# Patient Record
Sex: MALE | Race: WHITE
[De-identification: names, ages, dates, MRNs, and addresses within clinical notes are randomized per-mention and may not be internally consistent; named-entity substitution may affect disease eponyms.]

---

## 2017-04-10 NOTE — 48 HOUR POST ANESTHESIA EVAL
Post Anesthesia Evaluation


Procedure:  cataract extraction with IOL


Date of Evaluation:  Apr 10, 2017


Time of Evaluation:  09:03


Blood Pressure Systolic:  136


0:  74


Pulse Rate:  75


Respiratory Rate:  14


O2 Sat by Pulse Oximetry:  96


Airway:  patent


Nausea:  No


Vomiting:  No


Hydration Status:  adequate


Mental Status/LOC:  patient returned to baseline


Follow-up care needed:  N/A











SERGIO TANNER CRNA Apr 10, 2017 09:03

## 2017-04-10 NOTE — OPERATIVE NOTE - PDOC
Opthamology Op Report


Surgical Report


PREPROCEDURE DIAGNOSIS: Visually significant cataract, right eye





POSTPROCEDURE DIAGNOSIS: Visually significant cataract, right eye





PROCEDURE: Phacoemulsification with Intra-ocular lens placement, right eye, 

complex.





SURGEON: Gianni Peralta MD





ANESTHESIA: MAC, local anesthesia





IMPLANT(S): NISSA IOL: ZCBOO, Power 18 diopters





DRAINS: None


SPECIMEN: None


ESTIMATED BLOOD LOSS: None


BLOOD PRODUCTS ADMINISTERED: None


COMPLICATIONS: NoneDate of Discussion: 4/10/17 





A face-to-face discussion with the [] regarding the patient's advanced care 

planning took place during this hospitalization on the above date. The 

discussion included the explanation and discussion of advance directives and 

associated forms/documents, as well as the patient's current code status. We 

also discussed at length the patient's medical conditions (both acute and 

chronic), general prognosis, treatment options, and goals of care. The 

following summarizes the discussion:


Advance Care Planning/Goals of Care:


- Will attempt to fill out an AD and/or POLST with the patient prior to 

discharge, if not already completed


- Continue current evaluation and management of any acute and chronic medical 

issues


- Will continue to support the patient/family


- Will continue to discuss both short- and long-term goals of care


DPOA-HC/Surrogate Decision Maker:


None currently appointed []


Code Status:


Full Code 


AD Forms/Documents Completed:


Deferred 





A total of 31 minutes was spent on this discussion, including counseling, 

answering questions, and completing, if any, pertinent advanced care planning 

forms/documents.





FINDINGS: Opacified lens





INDICATIONS FOR PROCEDURE: 


This patient has a Visually significant cataract, who presented with blurred 

vision and difficulty reading and driving. 


Risks, benefits and alternatives to cataract surgery were explained to the 

patient, who agreed to proceed with the procedure. Informed consent was 

obtained and signed by the patient.





DESCRIPTION OF PROCEDURE:


The patient was seen by me along with anesthesia team in the pre op area and 

the surgical site was marked and confirmed. Anesthetic drops along with 

dilating drops was instilled in surgical eye in the pre op. The patient was 

then brought back to the operating loki placed in supine position. The eye was 

prepped with Betadine 5% and draped in sterile manner for the ophthalmic 

surgery.





An eyelid speculum was was placed to keep the eyelid open. Paracentesis wound 

was made superiorly though clear cornea near the limbus using 1.2mm side-port 

blade. Preservative free Lidocaine 1% was injected into the anterior chamber 

through the paracentesis wound. The anterior chamber was inflated with 

viscoelastic. A keratome blade was used to make a bi-planar, shelved, clear 

corneal incision, starting at temporal limbus and then tunneling through clear 

cornea to enter the anterior chamber.


 


Due to Flomax medication, a 7 mm Malyugin ring was positioned to maintain the 

pupil adequately dilated. A circular curvilinear continue capsulorrhexis was 

initiated by cystotome and continued with capsulorhexis forceps. The capsular 

flap then was removed. Hydrodissection and hydrodelineation was performed using 

BSS until the lens was freely rotatable. The lens nucleus was then removed 

using the phacoemulsification handpiece. The residual cortex was removed with 

the irrigation/aspiration handpieces. The posterior capsule was polished.  The 

capsular bag and anterior chamber were inflated with viscoelastic, and the IOL 

was inserted into the capsular bag. The Malyugin ring was removed.  Using 

irrigation/Aspiration handpiece on the aspiration mode, Healon was removed from 

the anterior and posterior chamber. The wound was adjusted and made water tight 

without sutures.  The intraocular pressure was adjusted to normal.





The speculum was removed. Vigamox and prednisolone acetate drops were placed on 

the eye. A clear shield were placed over the operative eye. All sponge and 

needle counts were correct.  The patient was transferred to the recovery room 

in stable condition having tolerated the procedure well.











GIANNI PERALTA Apr 10, 2017 08:54

## 2017-04-10 NOTE — ANETHESIA PREOPERATIVE EVAL
Anesthesia Pre-op PMH/ROS


General


Date of Evaluation:  Apr 10, 2017


Time of Evaluation:  07:40


Anesthesiologist:  ruben


ASA Score:  ASA 2


Mallampati Score


Class I : Soft palate, uvula, fauces, pillars visible


Class II: Soft palate, uvula, fauces visible


Class III: Soft palate, base of uvula visible


Class IV: Only hard plate visible


Mallampati Classification:  Class III


Surgeon:  kyle


Diagnosis:  cataract


Surgical Procedure:  cataract extraction with IOL


Anesthesia History:  none


Family History:  no anesthesia problems


Allergies:  


Coded Allergies:  


     SHELLFISH DERIVED (Unverified  Allergy, Intermediate, ITCHING/HIVES, 7/25/ 14)


     IODINE (Unverified  Allergy, Unknown, 7/25/14)


Uncoded Allergies:  


     SPINACH (Allergy, Intermediate, SEVERE ITCHING/HIVES, 7/25/14)


Medications:  see eMAR





Past Medical History


Cardiovascular:  Reports: HTN


Pulmonary:  Reports: other - hx PE


Gastrointestinal/Genitourinary:  Denies: CRI, ESRD, GERD, other


Neurologic/Psychiatric:  Reports: depression/anxiety


Endocrine:  Denies: DM, hypothyroidism, other, steroids


HEENT:  Reports: cataract (R)


Hematology/Immune:  Reports: other - PE from hip surgery


Musculoskeletal/Integumentary:  Denies: DDD, DJD, OA, RA, edema, other


PSxH Narrative:


hip surgery in 2014





Anesthesia Pre-op Phys. Exam


Physician Exam





Last Vital Signs








  Date Time  Temp Pulse Resp B/P Pulse Ox O2 Delivery O2 Flow Rate FiO2


 


4/10/17 07:15 97.0 75 18 136/85 95 Room Air  








Constitutional:  NAD


Neurologic:  CN 2-12 intact


Cardiovascular:  RRR


Respiratory:  CTA


Gastrointestinal:  S/NT/ND





Airway Exam


Mallampati Classification


3


MO:  full


Neck:  thick


ROM:  full


Dentures:  no lower, no upper





Anesthesia Pre-op A/P


Studies


Pre-op Studies:  EKG





Risk Assessment & Plan


Assessment:


denies sob, chest, able to lie flat


Plan:


mac


Status Change Before Surgery:  No





Pre-Antibiotics


Drug:  none











SERGIO TANNER CRNA Apr 10, 2017 08:15

## 2017-04-10 NOTE — IMMEDIATE POST-OP EVALUATION
Immediate Post-Op Evalulation


Immediate Post-Op Evalulation


Procedure:  cataract extraction with IOL


Date of Evaluation:  Apr 10, 2017


Time of Evaluation:  08:40


IV Fluids:  300


Blood Pressure Systolic:  149


Blood Pressure Diastolic:  94


Pulse Rate:  76


Respiratory Rate:  14


O2 Sat by Pulse Oximetry:  97


Temperature (Fahrenheit):  97.9


Nausea:  No


Vomiting:  No


Patient Status:  awake


Hydration Status:  adequate


Drug:  none











FLORESITARILLIONSERGIO CRNA Apr 10, 2017 08:43

## 2017-04-10 NOTE — PRE-PROCEDURE NOTE/ATTESTATION
Pre-Procedure Note/Attestation


Complete Prior to Procedure


Planned Procedure:  right


Procedure Narrative:


Cataract with intraocular lens, right eye





Indications for Procedure


Pre-Operative Diagnosis:


Cataract, right eye





Attestation


I attest that I discussed the nature of the procedure; its benefits; risks and 

complications; and alternatives (and the risks and benefits of such alternatives

), prior to the procedure, with the patient (or the patient's legal 

representative).





I attest that, if there was a reasonable possibility of needing a blood 

transfusion, the patient (or the patient's legal representative) was given the 

[facility] standardized written summary, pursuant 

to the [place] Blood Safety Act ([place] Health and Safety Code # 1645, as 

amended).





I attest that I re-evaluated the patient just prior to the surgery and that 

there has been no change in the patient's H&P, except as documented below:











GIANNI PERALTA Apr 10, 2017 08:43

## 2017-08-03 NOTE — DIAGNOSTIC IMAGING REPORT
Indication: Back pain



Technique: Continuous helical transaxial imaging of the lumbar spine was obtained

from the lung bases to the pubic symphysis.  No IV contrast was administered.

Coronal 2-D reformats were also obtained. Study obtained in a Siemens sensation 64

slice CT.



Total Dose length Product (DLP):  637 mGycm



CT Dose Index Volume (CTDIvol):   0.25, 18.53 mGy





Comparison: None



Findings: There is no evidence of an acute fracture or malalignment. There is

generalized osteopenia. Marginal endplate spurs are demonstrated throughout the

lumbar and visualized lower thoracic spine mild in degree. There is some narrowing

of the L3-4 disc. Hypertrophy of the lumbar facets noted mild in degree. There is no

soft tissue swelling. Aorta is moderately calcified.



Impression: No acute injury identified.



Spondylosis and osteopenia as described above.



Atherosclerotic disease



Dr. kaufman has communicated the preliminary results to the Emergency Department.

There are no significant discrepancies.







The CT scanner at [facility] is accredited by the American College 

of

Radiology and the scans are performed using dose optimization techniques as

appropriate to a performed exam including Automatic Exposure control.

## 2017-08-03 NOTE — CONSULTATION
DATE OF CONSULTATION:  08/03/2017



NEUROLOGICAL CONSULTATION



CONSULTING PHYSICIAN:  Tee Bonilla M.D.



REQUESTING PHYSICIAN:  Surekha Dewey M.D.



HISTORY OF PRESENT ILLNESS:  The patient is a 67-year-old man, seen

in neurological consultation to evaluate the episodes of unresponsiveness

with a fall and head trauma.



The patient informed me that he was getting ready for cataract surgery,

which was scheduled for this morning.  So for the last hours, he was not

drinking water and he was unable to sleep more than one hour.  He woke up

around 4:30 a.m., took his walker and went to the bathroom, there while

turning around, he lost consciousness and fell down.  Caregiver was

present and took him to the emergency room.



The patient apparently had injuries to his head, right wrist, and lower

back.  On arrival, he was complaining of pain in the injured side.



His vital signs on admission were stable.  Blood pressure 132/80, heart

rate of 96, and temperature of 97.5 degrees.



EKG, normal sinus rhythm.  No premature ventricular contractions.

Imaging studies were obtained and this included CAT scan of the brain.

This revealed mild diffuse atrophy, nonspecific small vessel disease, and

no evidence of acute abnormalities.



CT scan of the lumbar spine was obtained revealing osteopenia.  No acute

injury noted.  Atherosclerotic disease was noted.



There was evidence of multilevel degenerative joint disease.



Wrist x-ray, no fracture, some degenerative changes noted.  Laboratory

work included mild anemia, hemoglobin 13.7 and hematocrit 31.1.  There is

evidence of coagulopathy with INR of 2.9 and PT of 30.6.  Urinalysis was

normal.  Toxicology panel, phenytoin level of 9.2  Chemistry panel was

unremarkable except alkaline phosphatase 146 and a glucose 116.



Since admission to present, there was no further paroxysmal event

detected.



PAST MEDICAL HISTORY:  The patient has a history of hepatitis B.

There is a history of pulmonary embolism three years ago during which he

had fall, fractured his left hip, and required total hip replacement.  He

developed abnormality of gait.  Noted that, walking more than one block,

he develops profound weakness in his legs.  Also, he is using now

walker.



He has a history of migraine, history of glaucoma, and history of

chronic seizure disorder, the last seizure 30 years ago.  He is maintained

on anticonvulsants.



He has a history of basal cell carcinoma.  He underwent a cataract

surgery in the right eye in April of this year.



MEDICATIONS:  Treatment prior to admission included Tylenol,

amitriptyline 75 mg at bedtime, lansoprazole, Dilantin 100 mg t.i.d.,

primidone 250 mg t.i.d., tamsulosin, and warfarin 7.5 mg daily.



ALLERGIES:  Iodine, shellfish, _____, and spinach.



SOCIAL HISTORY:  Lives at home alone, but has a caregiver, who takes

care of him.  No alcohol.  No drug abuse.



FAMILY HISTORY:  Noncontributory.



REVIEW OF SYMPTOMS:  Aches and pains in his low back region and pain

on the top of the head from the injuries.



Denies chest pain or palpitations.  No respiratory difficulties.  Denies

abdominal pain or discomfort.  He has a urinary frequency, but no

constipation.



PHYSICAL EXAMINATION:

GENERAL:  A well-developed and well-nourished man, not in acute

distress, lying comfortably in bed.

HEENT:  Head, normocephalic with a bruise.

NECK:  Supple.  No meningeal signs.

MUSCULOSKELETAL EXAMINATION:  Tenderness to palpation in the lumbar

paraspinal region and right wrist.  Peripheral pulses 1+ symmetric.

Postoperative scarring in the left hip region.

MENTAL STATUS:  Alert and oriented x3.  Speech is fluent.  Language

intact.  There is no aphasia.  No apraxia.  Cognitive function normal.

CRANIAL NERVE II:  Pupils both responding to light and accommodation.

Extraocular movement, full range.

CRANIAL NERVE V:  Normal corneal responses.

CRANIAL NERVE VII:  No facial asymmetry.

CRANIAL NERVE VIII:  Slight decrease in hearing.

CRANIAL NERVES IX THROUGH XII:  Tongue is in midline.  Symmetric

palate elevation.

MOTOR EXAMINATION:  Revealed a normal muscle tone.  Strength 5/5 in

all extremities.  No involuntary movement.  Deep tendon reflexes 1+

symmetric with downgoing toes on both sides.

SENSORY EXAM:  Normal to pinprick and light touch.

GAIT:  Not tested, but reported able to ambulate with a walker.



IMPRESSION:

1. History of syncopal episode, most likely vasovagal due to sleep

deprivation and dehydration.

Rule out cardiac arrhythmia.

2. Chronic seizure disorder, fair control.

3. History of pulmonary embolism, now on anticoagulation.

4. History of migraine.

5. Mild anemia.



RECOMMENDATIONS:

1. Maintain proper hydration.

2. Continue with the current anticonvulsants.

3. Continue with the current treatment.

4. Cardiac workup pending.

5. The patient to be observed for any paroxysmal events.  Meanwhile,

continue with primidone and Dilantin.

6. From neuro point of view, the patient is cleared for eye procedure.



Thank you for allowing me to see this interesting patient in

neurological consultation.









  ______________________________________________

  Tee YOANDY Bonilla





DR:  YOANDY

D:  08/03/2017 15:50

T:  08/03/2017 22:12

JOB#:  [id number]

CC:

## 2017-08-03 NOTE — NEUROLOGY PROGRESS NOTE
Objective


Physical Exam





Last Vital Signs








  Date Time  Temp Pulse Resp B/P Pulse Ox O2 Delivery O2 Flow Rate FiO2


 


8/3/17 14:50 98.1 87 21 132/72 97 Room Air  











Laboratory Tests








Test


  8/3/17


07:10 8/3/17


08:00


 


White Blood Count


  6.5 K/UL


(4.8-10.8) 


 


 


Red Blood Count


  4.21 M/UL


(4.70-6.10)  L 


 


 


Hemoglobin


  13.7 G/DL


(14.2-18.0)  L 


 


 


Hematocrit


  41.1 %


(42.0-52.0)  L 


 


 


Mean Corpuscular Volume 98 FL (80-99)   


 


Mean Corpuscular Hemoglobin


  32.6 PG


(27.0-31.0)  H 


 


 


Mean Corpuscular Hemoglobin


Concent 33.4 G/DL


(32.0-36.0) 


 


 


Red Cell Distribution Width


  12.6 %


(11.6-14.8) 


 


 


Platelet Count


  197 K/UL


(150-450) 


 


 


Mean Platelet Volume


  7.0 FL


(6.5-10.1) 


 


 


Neutrophils (%) (Auto)


  78.3 %


(45.0-75.0)  H 


 


 


Lymphocytes (%) (Auto)


  12.0 %


(20.0-45.0)  L 


 


 


Monocytes (%) (Auto)


  8.2 %


(1.0-10.0) 


 


 


Eosinophils (%) (Auto)


  0.6 %


(0.0-3.0) 


 


 


Basophils (%) (Auto)


  0.9 %


(0.0-2.0) 


 


 


Prothrombin Time


  30.6 SEC


(9.30-11.50)  H 


 


 


Prothromb Time International


Ratio 2.9 (0.9-1.1)


H 


 


 


Activated Partial


Thromboplast Time 42 SEC (23-33)


H 


 


 


Sodium Level


  141 mEQ/L


(135-145) 


 


 


Potassium Level


  3.7 mEQ/L


(3.4-4.9) 


 


 


Chloride Level


  102 mEQ/L


() 


 


 


Carbon Dioxide Level


  26 mEQ/L


(20-30) 


 


 


Anion Gap 13 (5-15)   


 


Blood Urea Nitrogen


  13 mg/dL


(7-23) 


 


 


Creatinine


  0.9 mg/dL


(0.7-1.2) 


 


 


Estimat Glomerular Filtration


Rate > 60 mL/min


(>60) 


 


 


Glucose Level


  116 mg/dL


()  H 


 


 


Calcium Level


  9.4 mg/dL


(8.6-10.2) 


 


 


Total Bilirubin


  0.3 mg/dL


(0.0-1.2) 


 


 


Aspartate Amino Transf


(AST/SGOT) 22 U/L (5-40)  


  


 


 


Alanine Aminotransferase


(ALT/SGPT) 18 U/L (3-41)  


  


 


 


Alkaline Phosphatase


  146 U/L


()  H 


 


 


Troponin I


  < 0.30 ng/mL


(<=0.30) 


 


 


Total Protein


  7.0 g/dL


(6.6-8.7) 


 


 


Albumin


  4.0 g/dL


(3.5-5.2) 


 


 


Globulin 3.0 g/dL   


 


Albumin/Globulin Ratio 1.3 (1.0-2.7)   


 


Phenytoin (Dilantin) Level


  9.2 ug/mL


(10-20)  L 


 


 


Urine Color  Pale yellow  


 


Urine Appearance  Clear  


 


Urine pH  7 (4.5-8.0)  


 


Urine Specific Gravity


  


  1.010


(1.005-1.035)


 


Urine Protein


  


  Negative


(NEGATIVE)


 


Urine Glucose (UA)


  


  Negative


(NEGATIVE)


 


Urine Ketones


  


  Negative


(NEGATIVE)


 


Urine Occult Blood


  


  Negative


(NEGATIVE)


 


Urine Nitrite


  


  Negative


(NEGATIVE)


 


Urine Bilirubin


  


  Negative


(NEGATIVE)


 


Urine Urobilinogen


  


  Normal MG/DL


(0.0-1.0)


 


Urine Leukocyte Esterase


  


  Negative


(NEGATIVE)


 


Urine RBC


  


  0-2 /HPF (0 -


0)  H


 


Urine WBC


  


  0-2 /HPF (0 -


0)


 


Urine Squamous Epithelial


Cells 


  Occasional


/LPF


 


Urine Bacteria


  


  Occasional


/HPF (NONE)











Impression/Recommendations


Problems:  


(1) Syncope, vasovagal


(2) Seizure disorder, complex partial


Status:  stable


Recommendations


# [id number]











KRISTY STEIN Aug 3, 2017 15:54

## 2017-08-03 NOTE — EMERGENCY ROOM REPORT
History of Present Illness


General


Chief Complaint:  Head, Face, Neck Trauma


Source:  Patient





Present Illness


HPI


The patient 67-year-old male who presented after a fall.  Patient stated that 

he had increased pain to his head as well as to his right wrist and low back.  

Patient had unclear loss of consciousness.  He had prior history of Coumadin 

use.  Is followed by Dr. Monreal.  Patient states he has no recent tetanus 

vaccine last 10 years.   The patient stated that he did not recall hitting the 

floor.


Allergies:  


Coded Allergies:  


     SHELLFISH DERIVED (Unverified  Allergy, Intermediate, ITCHING/HIVES, 7/25/ 14)


     IODINE (Unverified  Allergy, Unknown, 7/25/14)


Uncoded Allergies:  


     SPINACH (Allergy, Intermediate, SEVERE ITCHING/HIVES, 7/25/14)





Patient History


Reviewed Nursing Documentation:  PMH: Agreed, PSxH: Agreed





Nursing Documentation-PMH


Hx Cardiac Problems:  Yes - cardiopulmonary emboli-2014, hepatitis B


Hx COPD:  Yes


Hx Cancer:  Yes - BASAL CELL CARCINOMA SKIN


Hx Gastrointestinal Problems:  Yes


Hx Neurological Problems:  Yes - cataract surgery right eye April 2017


Hx Seizures:  Yes - 30 YEARS AGO


Hx Epilepsy:  Yes - Well controlled with medications - last seizure 35 yrs ago


Hx Syncope:  Yes - THIS AM


Hx Headaches:  Yes - MIGRAINES


Hx Numbness:  Yes - TIPS OF THE TOES





Review of Systems


All Other Systems:  negative except mentioned in HPI





Physical Exam





Vital Signs








  Date Time  Temp Pulse Resp B/P Pulse Ox O2 Delivery O2 Flow Rate FiO2


 


8/3/17 06:08 97.5 96 19 132/80 93 Room Air  








Sp02 EP Interpretation:  reviewed, normal


General Appearance:  normal inspection, well appearing, no apparent distress, 

alert, GCS 15


Head:  atraumatic


ENT:  normal ENT inspection, hearing grossly normal, normal voice


Neck:  normal inspection, full range of motion, supple, no bony tend


Respiratory:  normal inspection, lungs clear, normal breath sounds, no 

respiratory distress, no retraction, no wheezing


Cardiovascular #1:  regular rate, rhythm, no edema


Gastrointestinal:  normal inspection, normal bowel sounds, non tender, soft, no 

guarding, no hernia


Genitourinary:  no CVA tenderness


Musculoskeletal:  normal inspection, back normal, normal range of motion


Neurologic:  normal inspection, alert, responsive, speech normal


Psychiatric:  normal inspection, judgement/insight normal, mood/affect normal


Skin:  normal inspection, normal color, no rash





Medical Decision Making


Diagnostic Impression:  


 Primary Impression:  


 Laceration of head


 Additional Impressions:  


 Lumbar back pain


 History of Coumadin therapy


 Syncopal episodes


ER Course


Patient presented for fall. Differential diagnosis included was not limited to 

neck fracture, CVA, close head injury, syncopal episode, basilar ischemia.  

Because of complexity of patient's case laboratory testing and imaging studies 

were ordered.The patient's history consistent with possible syncopal episode 

versus seizure.  Patient endorsed Dr Miller.





Labs








Test


  8/3/17


07:10 8/3/17


08:00 8/4/17


05:40


 


Troponin I


  < 0.30 ng/mL


(<=0.30) 


  


 


 


Phenytoin (Dilantin) Level


  9.2 ug/mL


(10-20) 


  


 


 


Urine Color  Pale yellow  


 


Urine Appearance  Clear  


 


Urine pH  7 (4.5-8.0)  


 


Urine Specific Gravity


  


  1.010


(1.005-1.035) 


 


 


Urine Protein


  


  Negative


(NEGATIVE) 


 


 


Urine Glucose (UA)


  


  Negative


(NEGATIVE) 


 


 


Urine Ketones


  


  Negative


(NEGATIVE) 


 


 


Urine Occult Blood


  


  Negative


(NEGATIVE) 


 


 


Urine Nitrite


  


  Negative


(NEGATIVE) 


 


 


Urine Bilirubin


  


  Negative


(NEGATIVE) 


 


 


Urine Urobilinogen


  


  Normal MG/DL


(0.0-1.0) 


 


 


Urine Leukocyte Esterase


  


  Negative


(NEGATIVE) 


 


 


Urine RBC


  


  0-2 /HPF (0 -


0) 


 


 


Urine WBC


  


  0-2 /HPF (0 -


0) 


 


 


Urine Squamous Epithelial


Cells 


  Occasional


/LPF 


 


 


Urine Bacteria


  


  Occasional


/HPF (NONE) 


 


 


White Blood Count


  


  


  5.3 K/UL


(4.8-10.8)


 


Red Blood Count


  


  


  4.18 M/UL


(4.70-6.10)


 


Hemoglobin


  


  


  13.6 G/DL


(14.2-18.0)


 


Hematocrit


  


  


  40.4 %


(42.0-52.0)


 


Mean Corpuscular Volume   97 FL (80-99) 


 


Mean Corpuscular Hemoglobin


  


  


  32.6 PG


(27.0-31.0)


 


Mean Corpuscular Hemoglobin


Concent 


  


  33.7 G/DL


(32.0-36.0)


 


Red Cell Distribution Width


  


  


  12.5 %


(11.6-14.8)


 


Platelet Count


  


  


  213 K/UL


(150-450)


 


Mean Platelet Volume


  


  


  7.7 FL


(6.5-10.1)


 


Neutrophils (%) (Auto)


  


  


  62.8 %


(45.0-75.0)


 


Lymphocytes (%) (Auto)


  


  


  21.8 %


(20.0-45.0)


 


Monocytes (%) (Auto)


  


  


  13.3 %


(1.0-10.0)


 


Eosinophils (%) (Auto)


  


  


  1.1 %


(0.0-3.0)


 


Basophils (%) (Auto)


  


  


  1.0 %


(0.0-2.0)


 


Prothrombin Time


  


  


  36.8 SEC


(9.30-11.50)


 


Prothromb Time International


Ratio 


  


  3.5 (0.9-1.1) 


 


 


Activated Partial


Thromboplast Time 


  


  52 SEC (23-33) 


 


 


Sodium Level


  


  


  141 mEQ/L


(135-145)


 


Potassium Level


  


  


  3.6 mEQ/L


(3.4-4.9)


 


Chloride Level


  


  


  103 mEQ/L


()


 


Carbon Dioxide Level


  


  


  24 mEQ/L


(20-30)


 


Anion Gap   14 (5-15) 


 


Blood Urea Nitrogen


  


  


  14 mg/dL


(7-23)


 


Creatinine


  


  


  0.7 mg/dL


(0.7-1.2)


 


Estimat Glomerular Filtration


Rate 


  


  > 60 mL/min


(>60)


 


Glucose Level


  


  


  107 mg/dL


()


 


Calcium Level


  


  


  9.4 mg/dL


(8.6-10.2)


 


Total Bilirubin


  


  


  0.3 mg/dL


(0.0-1.2)


 


Aspartate Amino Transf


(AST/SGOT) 


  


  21 U/L (5-40) 


 


 


Alanine Aminotransferase


(ALT/SGPT) 


  


  16 U/L (3-41) 


 


 


Alkaline Phosphatase


  


  


  143 U/L


()


 


Total Protein


  


  


  6.9 g/dL


(6.6-8.7)


 


Albumin


  


  


  4.0 g/dL


(3.5-5.2)


 


Globulin   2.9 g/dL 


 


Albumin/Globulin Ratio   1.3 (1.0-2.7) 


 


Triglycerides Level


  


  


  114 mg/dL (<


150)


 


Cholesterol Level


  


  


  195 mg/dL (<


200)


 


LDL Cholesterol


  


  


  58 mg/dL


(60-99)


 


HDL Cholesterol


  


  


  114 mg/dL (>


60)


 


Cholesterol/HDL Ratio   1.7 (3.3-4.4) 


 


Thyroid Stimulating Hormone


(TSH) 


  


  1.010 uIU/mL


(0.300-4.500)








EKG Diagnostic Results


Rate:  normal


Rhythm:  NSR


ST Segments:  no acute changes





Rhythm Strip Diag. Results


EP Interpretation:  yes


Rhythm:  NSR, no PVC's, no ectopy





Last Vital Signs








  Date Time  Temp Pulse Resp B/P Pulse Ox O2 Delivery O2 Flow Rate FiO2


 


8/3/17 06:08 97.5 96 19 132/80 93 Room Air  








Status:  unchanged


Disposition:  ADMITTED AS INPATIENT


Condition:  Serious











Zafar Salazar Aug 3, 2017 06:45

## 2017-08-03 NOTE — DIAGNOSTIC IMAGING REPORT
Indication: Headache



Technique: Contiguous 5 mm thick transaxial imaging of the head obtained in a

[employer] Sensation 64 slice CT scanner.  Soft tissue and bone windows generated.



Total Dose length Product (DLP):  1495 mGycm



CT Dose Index Volume (CTDIvol):   70.38 mGy



Comparison: none



Findings: There is mild prominence of the ventricles, basal cisterns, and cerebral

sulci consistent with atrophy. Mild, nonspecific, white matter hypoattenuation is

noted throughout the brain consistent with chronic small vessel disease.



There is no midline shift, edema, acute hemorrhage, mass effect, or abnormal

extra-axial fluid collections. Bones and extra osseous soft tissues are

unremarkable.



Impression: No acute intracranial bleed, mass effect or edema.



Mild atrophy of the brain.



Nonspecific white matter hypoattenuation probably due to chronic small vessel

disease.







The CT scanner at [facility] is accredited by the [employer] College 

of

Radiology and the scans are performed using dose optimization techniques as

appropriate to a performed exam including Automatic Exposure control.

## 2017-08-03 NOTE — HISTORY AND PHYSICAL
History of Present Illness


General


Date patient seen:  Aug 3, 2017


Reason for Hospitalization:  Head, Face, Neck Trauma





Present Illness


HPI


 67-year-old male with Hx of PE, seizures  who presented after a fall.  Patient 

stated that he had increased pain to his head as well as to his right wrist and 

low back.   The patient stated that he did not recall hitting the floor.  He is 

admitted to telemetry for wok up of his syncopal episode


Allergies:  


Coded Allergies:  


     SHELLFISH DERIVED (Unverified  Allergy, Intermediate, ITCHING/HIVES, 7/25/ 14)


     IODINE (Unverified  Allergy, Unknown, 7/25/14)


Uncoded Allergies:  


     SPINACH (Allergy, Intermediate, SEVERE ITCHING/HIVES, 7/25/14)





Medication History


Scheduled


Acetaminophen* (Acetaminophen 325MG Tablet*), 325 MG ORAL Q4H, (Reported)


Amitriptyline Hcl* (Amitriptyline Hcl*), 75 MG ORAL BEDTIME, (Reported)


Lansoprazole* (Lansoprazole*), 30 MG ORAL DAILY, (Reported)


Latanoprost* (Xalatan*), 1 DROP BOTH EYES BEDTIME, (Reported)


Phenytoin Sodium Extended* (Dilantin*), 100 MG ORAL THREE TIMES A DAY, (Reported

)


Phenytoin Sodium Extended* (Dilantin*), 100 MG ORAL THREE TIMES A DAY, (Reported

)


Primidone* (Mysoline*), 250 MG ORAL THREE TIMES A DAY


Tamsulosin Hcl (Tamsulosin Hcl*), 0.4 MG ORAL BEDTIME, (Reported)


Tolterodine Tartrate (Tolterodine Tartrate), 4 MG PO DAILY, (Reported)


Warfarin Sod* (Warfarin Sod*), 7.5 MG ORAL DAILY, (Reported)





Discontinued Medications


Brimonidine Tartrate* (Alphagan*), 1 DROP BOTH EYES TID, (Reported)


   Discontinued Reason: Pt stopped taking med


Dorzolamide Hcl* (Trusopt*), 1 DROP BOTH EYES BID, (Reported)


   Discontinued Reason: Pt stopped taking med





Patient History


Healthcare decision maker





Resuscitation status





Advanced Directive on File


Yes





Past Medical/Surgical History


Past Medical/Surgical History:  


(1) Seizures


(2) Pulmonary embolism





Review of Systems


All Other Systems:  negative except mentioned in HPI





Physical Exam


General Appearance:  WD/WN, no apparent distress


Lines, tubes and drains:  peripheral, central line


HEENT:  normocephalic, atraumatic


Neck:  non-tender, normal alignment


Respiratory/Chest:  chest wall non-tender, lungs clear


Breasts:  no masses


Cardiovascular/Chest:  normal rate


Abdomen:  normal bowel sounds, non tender


Genitourinary/Rectal:  normal rectal exam


Extremities:  normal range of motion





Last 24 Hour Vital Signs








  Date Time  Temp Pulse Resp B/P Pulse Ox O2 Delivery O2 Flow Rate FiO2


 


8/3/17 14:34 98.1 87 21 132/72 97 Room Air  


 


8/3/17 10:08  83 15 138/94 94 Room Air  


 


8/3/17 08:36 97.5 81 18 143/87 91 Room Air  


 


8/3/17 08:32 97.5       


 


8/3/17 06:55 97.5 86 18 148/83 93 Room Air  


 


8/3/17 06:08 97.5 96 19 132/80 93 Room Air  











Laboratory Tests








Test


  8/3/17


07:10 8/3/17


08:00


 


White Blood Count


  6.5 K/UL


(4.8-10.8) 


 


 


Red Blood Count


  4.21 M/UL


(4.70-6.10)  L 


 


 


Hemoglobin


  13.7 G/DL


(14.2-18.0)  L 


 


 


Hematocrit


  41.1 %


(42.0-52.0)  L 


 


 


Mean Corpuscular Volume 98 FL (80-99)   


 


Mean Corpuscular Hemoglobin


  32.6 PG


(27.0-31.0)  H 


 


 


Mean Corpuscular Hemoglobin


Concent 33.4 G/DL


(32.0-36.0) 


 


 


Red Cell Distribution Width


  12.6 %


(11.6-14.8) 


 


 


Platelet Count


  197 K/UL


(150-450) 


 


 


Mean Platelet Volume


  7.0 FL


(6.5-10.1) 


 


 


Neutrophils (%) (Auto)


  78.3 %


(45.0-75.0)  H 


 


 


Lymphocytes (%) (Auto)


  12.0 %


(20.0-45.0)  L 


 


 


Monocytes (%) (Auto)


  8.2 %


(1.0-10.0) 


 


 


Eosinophils (%) (Auto)


  0.6 %


(0.0-3.0) 


 


 


Basophils (%) (Auto)


  0.9 %


(0.0-2.0) 


 


 


Prothrombin Time


  30.6 SEC


(9.30-11.50)  H 


 


 


Prothromb Time International


Ratio 2.9 (0.9-1.1)


H 


 


 


Activated Partial


Thromboplast Time 42 SEC (23-33)


H 


 


 


Sodium Level


  141 mEQ/L


(135-145) 


 


 


Potassium Level


  3.7 mEQ/L


(3.4-4.9) 


 


 


Chloride Level


  102 mEQ/L


() 


 


 


Carbon Dioxide Level


  26 mEQ/L


(20-30) 


 


 


Anion Gap 13 (5-15)   


 


Blood Urea Nitrogen


  13 mg/dL


(7-23) 


 


 


Creatinine


  0.9 mg/dL


(0.7-1.2) 


 


 


Estimat Glomerular Filtration


Rate > 60 mL/min


(>60) 


 


 


Glucose Level


  116 mg/dL


()  H 


 


 


Calcium Level


  9.4 mg/dL


(8.6-10.2) 


 


 


Total Bilirubin


  0.3 mg/dL


(0.0-1.2) 


 


 


Aspartate Amino Transf


(AST/SGOT) 22 U/L (5-40)  


  


 


 


Alanine Aminotransferase


(ALT/SGPT) 18 U/L (3-41)  


  


 


 


Alkaline Phosphatase


  146 U/L


()  H 


 


 


Troponin I


  < 0.30 ng/mL


(<=0.30) 


 


 


Total Protein


  7.0 g/dL


(6.6-8.7) 


 


 


Albumin


  4.0 g/dL


(3.5-5.2) 


 


 


Globulin 3.0 g/dL   


 


Albumin/Globulin Ratio 1.3 (1.0-2.7)   


 


Phenytoin (Dilantin) Level


  9.2 ug/mL


(10-20)  L 


 


 


Urine Color  Pale yellow  


 


Urine Appearance  Clear  


 


Urine pH  7 (4.5-8.0)  


 


Urine Specific Gravity


  


  1.010


(1.005-1.035)


 


Urine Protein


  


  Negative


(NEGATIVE)


 


Urine Glucose (UA)


  


  Negative


(NEGATIVE)


 


Urine Ketones


  


  Negative


(NEGATIVE)


 


Urine Occult Blood


  


  Negative


(NEGATIVE)


 


Urine Nitrite


  


  Negative


(NEGATIVE)


 


Urine Bilirubin


  


  Negative


(NEGATIVE)


 


Urine Urobilinogen


  


  Normal MG/DL


(0.0-1.0)


 


Urine Leukocyte Esterase


  


  Negative


(NEGATIVE)


 


Urine RBC


  


  0-2 /HPF (0 -


0)  H


 


Urine WBC


  


  0-2 /HPF (0 -


0)


 


Urine Squamous Epithelial


Cells 


  Occasional


/LPF


 


Urine Bacteria


  


  Occasional


/HPF (NONE)








Height (Feet):  5


Height (Inches):  10.00


Weight (Pounds):  204


Medications





Current Medications








 Medications


  (Trade)  Dose


 Ordered  Sig/Eddie


 Route


 PRN Reason  Start Time


 Stop Time Status Last Admin


Dose Admin


 


 Acetaminophen


  (Tylenol)  650 mg  Q4H  PRN


 ORAL


 T>100.5  8/3/17 12:15


 9/2/17 12:14   


 


 


 Al Hydroxide/Mg


 Hydroxide


  (Mylanta II)  30 ml  Q6H  PRN


 ORAL


 dyspepsia  8/3/17 12:15


 9/2/17 12:14   


 


 


 Albuterol/


 Ipratropium


  (DuoNeb


 0.5-3(2.5)mg/3ml)  3 ml  Q4H  PRN


 HHN


 Shortness of Breath  8/3/17 12:15


 8/8/17 12:14   


 


 


 Amitriptyline HCl


  (Elavil)  75 mg  BEDTIME


 ORAL


   8/3/17 21:00


 9/2/17 20:59   


 


 


 Clonidine HCl


  (Catapres)  0.1 mg  Q4H  PRN


 ORAL


 SBP>160mmHg  8/3/17 12:15


 9/2/17 12:14   


 


 


 Dextrose


  (Dextrose 50%)    STAT  PRN


 IV


 Hypoglycemia  8/3/17 12:15


 9/2/17 12:14   


 


 


 Heparin Sodium


  (Porcine)


  (Heparin 5000


 units/ml)  5,000 units  EVERY 12  HOURS


 SUBQ


   8/3/17 21:00


 9/2/17 20:59   


 


 


 Latanoprost


  (Xalatan)  1 drop  BEDTIME


 BOTH EYES


   8/3/17 21:00


 9/2/17 20:59   


 


 


 Lorazepam


  (Ativan 2mg/ml


 1ml)  0.5 mg  Q4H  PRN


 IV


 For Anxiety  8/3/17 12:15


 8/10/17 12:14   


 


 


 Morphine Sulfate


  (Morphine


 Sulfate)  1 mg  Q4H  PRN


 IVP


 For Pain 7-10  8/3/17 12:15


 8/10/17 12:14   


 


 


 Nitroglycerin


  (Ntg)  0.4 mg  Q5M X 3 DOSES PRN


 SL


 Prn Chest Pain  8/3/17 12:15


 9/2/17 12:14   


 


 


 Ondansetron HCl


  (Zofran)  4 mg  Q6H  PRN


 IVP


 Nausea & Vomiting  8/3/17 12:15


 9/2/17 12:14   


 


 


 Phenytoin


  (Dilantin)  100 mg  THREE TIMES A  DAY


 ORAL


   8/3/17 13:00


 9/2/17 12:59   


 


 


 Polyethylene


 Glycol


  (Miralax)  17 gm  HSPRN  PRN


 ORAL


 Constipation  8/3/17 21:00


 9/2/17 20:59   


 


 


 Primidone


  (Mysoline)  250 mg  THREE TIMES A  DAY


 ORAL


   8/3/17 14:00


 9/2/17 13:59   


 


 


 Tamsulosin HCl


  (Flomax)  0.4 mg  BEDTIME


 ORAL


   8/3/17 21:00


 9/2/17 20:59   


 


 


 Temazepam


  (Restoril)  15 mg  HSPRN  PRN


 ORAL


 Insomnia  8/3/17 21:00


 8/10/17 20:59   


 











Assessment/Plan


Problem List:  


(1) Syncopal episodes


ICD Codes:  R55 - Syncope and collapse


SNOMED:  050872519


(2) Laceration of head


ICD Codes:  S01.91XA - Laceration without foreign body of unspecified part of 

head, initial encounter


SNOMED:  661349214


(3) History of Coumadin therapy


ICD Codes:  Z79.01 - Long term (current) use of anticoagulants


SNOMED:  214543380


(4) Seizures


ICD Codes:  R56.9 - Seizures


SNOMED:  19744913


Assessment/Plan


telemetry


2decho


doppler of carotid artery


cardio and neuro evaluation











ROSY CHAVARRIA Aug 3, 2017 14:35

## 2017-08-03 NOTE — DIAGNOSTIC IMAGING REPORT
Indication: Pain



Findings: 3  views of the right wrist were obtained.



No acute fractures, malalignment, erosions or periostitis are identified. Small

cystic focus noted on the ulnar side of the distal radioulnar joint. Small cystic

focus involving the distal pole of the scaphoid also noted. These are 

probably

degenerative in nature. Bone mineralization is within normal limits. Soft tissues

are unremarkable.



Impression: No acute injury.



Some degenerative changes as described above

## 2017-08-04 NOTE — NEUROLOGY PROGRESS NOTE
Interim History


Interim History


ROS Limited/Unobtainable:  No


Complaints:  no sz


Events:  stable





Objective


Physical Exam





Last Vital Signs








  Date Time  Temp Pulse Resp B/P Pulse Ox O2 Delivery O2 Flow Rate FiO2


 


8/4/17 11:37 97.9 76 20 140/77 91 Room Air  











Laboratory Tests








Test


  8/4/17


05:40


 


White Blood Count


  5.3 K/UL


(4.8-10.8)


 


Red Blood Count


  4.18 M/UL


(4.70-6.10)  L


 


Hemoglobin


  13.6 G/DL


(14.2-18.0)  L


 


Hematocrit


  40.4 %


(42.0-52.0)  L


 


Mean Corpuscular Volume 97 FL (80-99)  


 


Mean Corpuscular Hemoglobin


  32.6 PG


(27.0-31.0)  H


 


Mean Corpuscular Hemoglobin


Concent 33.7 G/DL


(32.0-36.0)


 


Red Cell Distribution Width


  12.5 %


(11.6-14.8)


 


Platelet Count


  213 K/UL


(150-450)


 


Mean Platelet Volume


  7.7 FL


(6.5-10.1)


 


Neutrophils (%) (Auto)


  62.8 %


(45.0-75.0)


 


Lymphocytes (%) (Auto)


  21.8 %


(20.0-45.0)


 


Monocytes (%) (Auto)


  13.3 %


(1.0-10.0)  H


 


Eosinophils (%) (Auto)


  1.1 %


(0.0-3.0)


 


Basophils (%) (Auto)


  1.0 %


(0.0-2.0)


 


Prothrombin Time


  36.8 SEC


(9.30-11.50)  H


 


Prothromb Time International


Ratio 3.5 (0.9-1.1)


H


 


Activated Partial


Thromboplast Time 52 SEC (23-33)


H


 


Sodium Level


  141 mEQ/L


(135-145)


 


Potassium Level


  3.6 mEQ/L


(3.4-4.9)


 


Chloride Level


  103 mEQ/L


()


 


Carbon Dioxide Level


  24 mEQ/L


(20-30)


 


Anion Gap 14 (5-15)  


 


Blood Urea Nitrogen


  14 mg/dL


(7-23)


 


Creatinine


  0.7 mg/dL


(0.7-1.2)


 


Estimat Glomerular Filtration


Rate > 60 mL/min


(>60)


 


Glucose Level


  107 mg/dL


()  H


 


Calcium Level


  9.4 mg/dL


(8.6-10.2)


 


Total Bilirubin


  0.3 mg/dL


(0.0-1.2)


 


Aspartate Amino Transf


(AST/SGOT) 21 U/L (5-40)  


 


 


Alanine Aminotransferase


(ALT/SGPT) 16 U/L (3-41)  


 


 


Alkaline Phosphatase


  143 U/L


()  H


 


Total Protein


  6.9 g/dL


(6.6-8.7)


 


Albumin


  4.0 g/dL


(3.5-5.2)


 


Globulin 2.9 g/dL  


 


Albumin/Globulin Ratio 1.3 (1.0-2.7)  


 


Triglycerides Level


  114 mg/dL (<


150)


 


Cholesterol Level


  195 mg/dL (<


200)


 


LDL Cholesterol


  58 mg/dL


(60-99)  L


 


HDL Cholesterol


  114 mg/dL (>


60)  H


 


Cholesterol/HDL Ratio


  1.7 (3.3-4.4)


L


 


Thyroid Stimulating Hormone


(TSH) 1.010 uIU/mL


(0.300-4.500)








General:  well developed, well nourished, no acute distress


Head:  normocophalic, other - R frontal bruize





Neurologic Exam


Mental Status:  awake, alert, oriented x4, normal cognition, good mathematical 

skills, normal recent memory, normal remote memory, preserved visuospatial 

function


Speech:  normal speech, no dysarthia


Language:  normal language, no aphasia


Cranial Nerve II:  fundus normal, visual fields, no papilledema


Cranial Nerves III, IV, VI:  PERRLA, EOMI, pupils


Cranial Nerve V:  normal facial sensations, temporales function normal, 

masseters function normal, pterygoids function normal


Cranial Nerve VII:  no facial asymmetry, normal facial expressions


Cranial Nerve VIII:  normal hearing, no nystagmus


Cranial Nerve IX:  normal palate elevation, gag response


Cranial Nerve X:  no voice hoarseness


Cranial Nerve XI:  SCM symmetric, trapezii function normal


Cranial Nerve XII:  tongue midline, no tongue atrophy/fasciculations


Motor System:  normal muscle tone, strength 5/5, no involuntary movement, no 

muscle wasting


Sensory:  normal pinprick, normal light touch, normal position sense, normal 

graphesthesia


Coordination:  normal finger to nose bilaterally, normal heel to shin 

bilaterally, negative Romberg test


Deep Tendon Reflexes:  0 ankle (L), 0 ankle (R), 0 bicep (L), 0 bicep (R), 0 

brachioradialis (L), 0 brachioradialis (R), 0 knee (L), 0 knee (R), 0 tricep (L)

, 0 tricep (R)


Reflexes:  mute plantar (L), mute plantar (R)


Stance:  normal


Gait:  stable, normal regular, heel + toe gait





Impression/Recommendations


Problems:  


(1) Syncope, vasovagal


(2) Seizure disorder, complex partial


Status:  stable


Recommendations


# [id number]


 cont with dilantin/mysolin


 neuro   stable











KRISTY STEIN Aug 4, 2017 12:05

## 2017-08-04 NOTE — DIAGNOSTIC IMAGING REPORT
--------------- APPROVED REPORT --------------





CPT Code: 83389



Vascular Symptoms

Syncope



Doppler Spectral Velocity Analysis

RightLeft





BILATERAL: CCA/BULB - Imaging reveals irregular, minimal (5-10%) plaque in both carotid 



carotid arteries. The Doppler spectral flow analysis is within normal limits throughout 

the internal and external carotid arteries. VERTEBRALS - Imaging reveals both vertebral 

arteries to be patent, without evidence of stenosis or steal.

## 2017-08-04 NOTE — PULMONOLOGY PROGRESS NOTE
Assessment/Plan


Problems:  


(1) Syncope, vasovagal


(2) History of Coumadin therapy


Assessment/Plan


check echo


cardio evaluation


neuro appreciated





Subjective


ROS Limited/Unobtainable:  No


Constitutional:  Reports: no symptoms


HEENT:  Repors: no symptoms


Respiratory:  Reports: no symptoms


Allergies:  


Coded Allergies:  


     SHELLFISH DERIVED (Unverified  Allergy, Intermediate, ITCHING/HIVES, 7/25/ 14)


     IODINE (Unverified  Allergy, Unknown, 7/25/14)


Uncoded Allergies:  


     SPINACH (Allergy, Intermediate, SEVERE ITCHING/HIVES, 7/25/14)





Objective





Last 24 Hour Vital Signs








  Date Time  Temp Pulse Resp B/P Pulse Ox O2 Delivery O2 Flow Rate FiO2


 


8/4/17 15:33 98.1 103 20 144/75 96   


 


8/4/17 12:00  84      


 


8/4/17 11:37 97.9 76 20 140/77 91 Room Air  


 


8/4/17 08:00  82      


 


8/4/17 07:56  82 18   Room Air  


 


8/4/17 07:46 97.7 84 20 134/81 91 Room Air  


 


8/4/17 04:07 97.1 91 19 142/64 95 Room Air  


 


8/4/17 04:00  86      


 


8/4/17 00:00  94      


 


8/3/17 23:38 97.2       


 


8/3/17 20:00  98      


 


8/3/17 20:00 97.2 94 20 153/93 95 Room Air  


 


8/3/17 19:55  89 20   Room Air  


 


8/3/17 19:12  90      

















Intake and Output  


 


 8/3/17 8/4/17





 19:00 07:00


 


Intake Total 120 ml 


 


Output Total 250 ml 


 


Balance -130 ml 


 


  


 


Intake Oral 120 ml 


 


Output Urine Total 250 ml 


 


# Voids 1 








General Appearance:  WD/WN


HEENT:  normocephalic


Respiratory/Chest:  chest wall non-tender, lungs clear


Cardiovascular:  normal peripheral pulses, normal rate


Abdomen:  normal bowel sounds


Genitourinary:  normal external genitalia


Extremities:  no cyanosis


Laboratory Tests


8/4/17 05:40: 


White Blood Count 5.3, Red Blood Count 4.18L, Hemoglobin 13.6L, Hematocrit 40.4L

, Mean Corpuscular Volume 97, Mean Corpuscular Hemoglobin 32.6H, Mean 

Corpuscular Hemoglobin Concent 33.7, Red Cell Distribution Width 12.5, Platelet 

Count 213, Mean Platelet Volume 7.7, Neutrophils (%) (Auto) 62.8, Lymphocytes (%

) (Auto) 21.8, Monocytes (%) (Auto) 13.3H, Eosinophils (%) (Auto) 1.1, 

Basophils (%) (Auto) 1.0, Prothrombin Time 36.8H, Prothromb Time International 

Ratio 3.5H, Activated Partial Thromboplast Time 52H, Sodium Level 141, 

Potassium Level 3.6, Chloride Level 103, Carbon Dioxide Level 24, Anion Gap 14, 

Blood Urea Nitrogen 14, Creatinine 0.7, Estimat Glomerular Filtration Rate > 60

, Glucose Level 107H, Calcium Level 9.4, Total Bilirubin 0.3, Aspartate Amino 

Transf (AST/SGOT) 21, Alanine Aminotransferase (ALT/SGPT) 16, Alkaline 

Phosphatase 143H, Total Protein 6.9, Albumin 4.0, Globulin 2.9, Albumin/

Globulin Ratio 1.3, Triglycerides Level 114, Cholesterol Level 195, LDL 

Cholesterol 58L, HDL Cholesterol 114H, Cholesterol/HDL Ratio 1.7L, Thyroid 

Stimulating Hormone (TSH) 1.010





Current Medications








 Medications


  (Trade)  Dose


 Ordered  Sig/Eddie


 Route


 PRN Reason  Start Time


 Stop Time Status Last Admin


Dose Admin


 


 Acetaminophen


  (Tylenol)  650 mg  Q4H  PRN


 ORAL


 T>100.5  8/3/17 12:15


 9/2/17 12:14  8/4/17 09:14


 


 


 Al Hydroxide/Mg


 Hydroxide


  (Mylanta II)  30 ml  Q6H  PRN


 ORAL


 dyspepsia  8/3/17 12:15


 9/2/17 12:14   


 


 


 Albuterol/


 Ipratropium


  (DuoNeb


 0.5-3(2.5)mg/3ml)  3 ml  Q4H  PRN


 HHN


 Shortness of Breath  8/3/17 12:15


 8/8/17 12:14   


 


 


 Amitriptyline HCl


  (Elavil)  75 mg  BEDTIME


 ORAL


   8/3/17 21:00


 9/2/17 20:59  8/3/17 20:39


 


 


 Clonidine HCl


  (Catapres)  0.1 mg  Q4H  PRN


 ORAL


 SBP>160mmHg  8/3/17 12:15


 9/2/17 12:14   


 


 


 Dextrose


  (Dextrose 50%)    STAT  PRN


 IV


 Hypoglycemia  8/3/17 12:15


 9/2/17 12:14   


 


 


 Latanoprost


  (Xalatan)  1 drop  BEDTIME


 BOTH EYES


   8/3/17 21:00


 9/2/17 20:59  8/3/17 21:41


 


 


 Lorazepam


  (Ativan 2mg/ml


 1ml)  0.5 mg  Q4H  PRN


 IV


 For Anxiety  8/3/17 12:15


 8/10/17 12:14   


 


 


 Morphine Sulfate


  (Morphine


 Sulfate)  1 mg  Q4H  PRN


 IVP


 For Pain 7-10  8/3/17 12:15


 8/10/17 12:14   


 


 


 Nitroglycerin


  (Ntg)  0.4 mg  Q5M X 3 DOSES PRN


 SL


 Prn Chest Pain  8/3/17 12:15


 9/2/17 12:14   


 


 


 Ondansetron HCl


  (Zofran)  4 mg  Q6H  PRN


 IVP


 Nausea & Vomiting  8/3/17 12:15


 9/2/17 12:14   


 


 


 Patient Own


 Medication


  (Patient's Own


 Med)  1 ea  TID


 ORAL


   8/4/17 09:00


 9/3/17 08:59  8/4/17 13:56


 


 


 Polyethylene


 Glycol


  (Miralax)  17 gm  HSPRN  PRN


 ORAL


 Constipation  8/3/17 21:00


 9/2/17 20:59   


 


 


 Primidone


  (Mysoline)  250 mg  Q8H


 ORAL


   8/3/17 21:00


 9/2/17 20:59  8/4/17 13:55


 


 


 Tamsulosin HCl


  (Flomax)  0.4 mg  BEDTIME


 ORAL


   8/3/17 21:00


 9/2/17 20:59  8/3/17 20:39


 


 


 Temazepam


  (Restoril)  15 mg  HSPRN  PRN


 ORAL


 Insomnia  8/3/17 21:00


 8/10/17 20:59   


 


 


 Warfarin Sodium


  (Coumadin)  7.5 mg  DAILY@17


 ORAL


   8/4/17 17:00


 8/9/17 16:59 ROSY ARMAS Aug 4, 2017 17:54

## 2017-08-05 NOTE — PULMONOLOGY PROGRESS NOTE
Assessment/Plan


Assessment/Plan


ASSESSMENT


s/p fall 


syncopal episode, likely vasovagal 2 to dehydration and sleep deprivation 


dehydration  


chronic seizure disorder 


hx of PE, on a/coagulation therapy 


Migraines 


 





PLAN OF CARE   


tele


CT head no acute IC pathology  


CT L spine no acute injury 


R wrist X ray no acute injury  


Carotid Duplex minimal plaque  bilateral carotid 


neuro follows 


per neuro syncope likely vasovagal in origin due to dehydration and sleep 

deprivation  


IVF


Lipid panel stable  


seizure precautions 


continue Dilantin 





cardio eval pending 


troponin negative 


SR on tele 


BP stable 


ECHO with EF 55% and RVSO of 10   Couamdin resumed 


O2 HHN prn 


PT/OT 


fall precautions 


still migraines  


pain management 


transfer to [facility] floor 


dc plan for tomorrow   





case discussed and evaluated by supervising physician





Subjective


Allergies:  


Coded Allergies:  


     SHELLFISH DERIVED (Unverified  Allergy, Intermediate, ITCHING/HIVES, 7/25/ 14)


     IODINE (Unverified  Allergy, Unknown, 7/25/14)


Uncoded Allergies:  


     SPINACH (Allergy, Intermediate, SEVERE ITCHING/HIVES, 7/25/14)


Subjective


c/o headaches 


no chest pain, no SOB no dizziness, no blackouts    


no  seizure activity





Objective





Last 24 Hour Vital Signs








  Date Time  Temp Pulse Resp B/P Pulse Ox O2 Delivery O2 Flow Rate FiO2


 


8/5/17 12:00 97.5 85 18 129/83 94 Room Air  


 


8/5/17 12:00  56      


 


8/5/17 08:00  82      


 


8/5/17 08:00 97.5 80 18 121/75 93 Room Air  


 


8/5/17 07:44  76 20   Room Air  


 


8/5/17 04:00 97.4 72 20 115/65 94 Room Air  


 


8/5/17 03:48  85      


 


8/5/17 00:00 97.6 76 20 116/65 93 Room Air  


 


8/5/17 00:00  86      


 


8/4/17 20:01  80 18   Room Air  


 


8/4/17 20:00 97.2 84 19 134/82 90 Room Air  


 


8/4/17 19:49  83      


 


8/4/17 19:41 97.2       


 


8/4/17 16:00  88      


 


8/4/17 15:33 98.1 103 20 144/75 96   

















Intake and Output  


 


 8/4/17 8/5/17





 19:00 07:00


 


Intake Total 720 ml 50 ml


 


Output Total 650 ml 285 ml


 


Balance 70 ml -235 ml


 


  


 


Intake Oral 720 ml 50 ml


 


Output Urine Total 650 ml 285 ml


 


# Voids  3


 


# Bowel Movements 1 








General Appearance:  no acute distress


HEENT:  normocephalic, atraumatic, anicteric, mucous membranes moist


Respiratory/Chest:  lungs clear, no respiratory distress, no accessory muscle 

use


Cardiovascular:  normal peripheral pulses, normal rate, no JVD


Abdomen:  normal bowel sounds, soft, non tender


Extremities:  no edema, pedal pulses normal


Neurologic/Psychiatric:  abnormal gait - with walker , alert, responsive, 

normal mood/affect


Musculoskeletal:  normal muscle bulk


Laboratory Tests


8/5/17 08:40: 


Prothrombin Time 26.2H, Prothromb Time International Ratio 2.5H





Current Medications








 Medications


  (Trade)  Dose


 Ordered  Sig/Eddie


 Route


 PRN Reason  Start Time


 Stop Time Status Last Admin


Dose Admin


 


 Acetaminophen


  (Tylenol)  650 mg  Q4H  PRN


 ORAL


 Fever/Headache/Mild Pain  8/5/17 00:15


 9/4/17 00:14  8/5/17 09:13


 


 


 Al Hydroxide/Mg


 Hydroxide


  (Mylanta II)  30 ml  Q6H  PRN


 ORAL


 dyspepsia  8/3/17 12:15


 9/2/17 12:14   


 


 


 Albuterol/


 Ipratropium


  (DuoNeb


 0.5-3(2.5)mg/3ml)  3 ml  Q4H  PRN


 HHN


 Shortness of Breath  8/3/17 12:15


 8/8/17 12:14   


 


 


 Amitriptyline HCl


  (Elavil)  75 mg  BEDTIME


 ORAL


   8/3/17 21:00


 9/2/17 20:59  8/4/17 21:48


 


 


 Clonidine HCl


  (Catapres)  0.1 mg  Q4H  PRN


 ORAL


 SBP>160mmHg  8/3/17 12:15


 9/2/17 12:14   


 


 


 Dextrose


  (Dextrose 50%)    STAT  PRN


 IV


 Hypoglycemia  8/3/17 12:15


 9/2/17 12:14   


 


 


 Latanoprost


  (Xalatan)  1 drop  BEDTIME


 BOTH EYES


   8/3/17 21:00


 9/2/17 20:59  8/4/17 22:33


 


 


 Lorazepam


  (Ativan 2mg/ml


 1ml)  0.5 mg  Q4H  PRN


 IV


 For Anxiety  8/3/17 12:15


 8/10/17 12:14   


 


 


 Morphine Sulfate


  (Morphine


 Sulfate)  1 mg  Q4H  PRN


 IVP


 For Pain 7-10  8/3/17 12:15


 8/10/17 12:14   


 


 


 Nitroglycerin


  (Ntg)  0.4 mg  Q5M X 3 DOSES PRN


 SL


 Prn Chest Pain  8/3/17 12:15


 9/2/17 12:14   


 


 


 Ondansetron HCl


  (Zofran)  4 mg  Q6H  PRN


 IVP


 Nausea & Vomiting  8/3/17 12:15


 9/2/17 12:14   


 


 


 Patient Own


 Medication


  (Patient's Own


 Med)  1 ea  TID


 ORAL


   8/4/17 09:00


 9/3/17 08:59  8/5/17 13:52


 


 


 Polyethylene


 Glycol


  (Miralax)  17 gm  HSPRN  PRN


 ORAL


 Constipation  8/3/17 21:00


 9/2/17 20:59   


 


 


 Primidone


  (Mysoline)  250 mg  Q8H


 ORAL


   8/3/17 21:00


 9/2/17 20:59  8/5/17 13:52


 


 


 Tamsulosin HCl


  (Flomax)  0.4 mg  BEDTIME


 ORAL


   8/3/17 21:00


 9/2/17 20:59  8/4/17 21:48


 


 


 Temazepam


  (Restoril)  15 mg  HSPRN  PRN


 ORAL


 Insomnia  8/3/17 21:00


 8/10/17 20:59   


 


 


 Tolterodine


 Tartrate


  (Detrol)  2 mg  BID


 ORAL


   8/5/17 09:00


 9/4/17 08:59  8/5/17 09:13


 


 


 Warfarin Sodium


  (Coumadin per


 pharmacy)  1 ea  DAILY  PRN


 MISC


 Per rx protocol  8/5/17 10:15


 9/4/17 10:14   


 


 


 Warfarin Sodium


  (Coumadin)  6 mg  COUMADIN


 ORAL


   8/5/17 17:00


 8/10/17 16:59   


 

















Familia ([facility])Clary NP Aug 5, 2017 15:30

## 2017-08-05 NOTE — CARDIOLOGY PROGRESS NOTE
Assessment/Plan


Assessment/Plan


syncope 





orthostatitc vital if neg ok to dc 





[id number]





Objective





Last 24 Hour Vital Signs








  Date Time  Temp Pulse Resp B/P Pulse Ox O2 Delivery O2 Flow Rate FiO2


 


8/5/17 12:00 97.5 85 18 129/83 94 Room Air  


 


8/5/17 12:00  56      


 


8/5/17 08:00  82      


 


8/5/17 08:00 97.5 80 18 121/75 93 Room Air  


 


8/5/17 07:44  76 20   Room Air  


 


8/5/17 04:00 97.4 72 20 115/65 94 Room Air  


 


8/5/17 03:48  85      


 


8/5/17 00:00 97.6 76 20 116/65 93 Room Air  


 


8/5/17 00:00  86      


 


8/4/17 20:01  80 18   Room Air  


 


8/4/17 20:00 97.2 84 19 134/82 90 Room Air  


 


8/4/17 19:49  83      


 


8/4/17 19:41 97.2       

















Intake and Output  


 


 8/4/17 8/5/17





 19:00 07:00


 


Intake Total 720 ml 50 ml


 


Output Total 650 ml 285 ml


 


Balance 70 ml -235 ml


 


  


 


Intake Oral 720 ml 50 ml


 


Output Urine Total 650 ml 285 ml


 


# Voids  3


 


# Bowel Movements 1 











Laboratory Tests








Test


  8/5/17


08:40


 


Prothrombin Time


  26.2 SEC


(9.30-11.50)  H


 


Prothromb Time International


Ratio 2.5 (0.9-1.1)


H

















PAUL BECKETT Aug 5, 2017 16:36

## 2017-08-05 NOTE — CONSULTATION
DATE OF CONSULTATION:  08/05/2017



CARDIOLOGY CONSULTATION



CONSULTING PHYSICIAN:  Alcon Cheek M.D.



REFERRING PHYSICIAN:  Surekha Dewey M.D.



REASON FOR REFERRAL:  Syncope.



HISTORY OF PRESENT ILLNESS:  This is a 67-year-old gentleman with a

history of multiple medical problems as delineated below.  The patient

apparently was scheduled to come into the hospital for a cataract

extraction with eventual hospitalization, however, got up early in the

morning and went to the bathroom and the next thing he noticed he was on

the floor.  His caretaker apparently told him that he was trying to get

hold of a towel bar that was loose and he fell.  He does not recall the

actual fall.  He has had not had any episode of syncope before.  He does

not have any cardiac issues or problem.  He does not have any chest pain

or pressure.  There is no PND.  He uses one or two pillows.  No heart

pounding or palpitations and usually when he sits up or stands, that he

gets dizzy or lightheaded.  He does have occasional dyspnea on exertion.



PAST MEDICAL HISTORY:  Negative for diabetes, high blood pressure,

heart attack, or high cholesterol.  He does have some lymph cancer.  He

has never had a stroke.  He does have a history of hepatitis B.  No

asthma.  No emphysema.  No ulcers.  No kidney problems.  He does have a

slight degree of cirrhosis that we had biopsy due to diagnosis of

hepatitis C.  He does have some enlarged prostate and he is taking

medication for.  He does have a history of pulmonary embolism on prior

occasions as well after hip surgery that he had sometime ago.  He does

have a history of chronic obstructive pulmonary disease as well and right

ventricular enlargement with abnormal liver functions secondary to massive

liver congestion that occurred in 2014 in addition to pulmonary

hypertension at that time.



ALLERGIES:  He is allergic to iodine, shellfish, and spinach.



SOCIAL HISTORY:  He quit smoking three years ago.  No alcoholic

beverages.  No drug use.



REVIEW OF SYSTEMS:  Gastrointestinal:  He denies any nausea,

vomiting, diarrhea, or constipation.  Genitourinary:  Negative.

Pulmonary:  Negative.  Constitutional:  Negative.  Neurological:

Negative.



PHYSICAL EXAMINATION:

GENERAL:  Shows to be elderly gentleman, in no apparent respiratory

distress.

VITAL SIGNS:  His blood pressure is anywhere between 116/65 to 144/75

and his heart rates in the 70s to 80s and oxygen saturation 93% to 94% on

room air.

NECK:  Supple.  No jugular venous distention.

LUNGS:  Clear to auscultation and percussion.

CARDIAC:  S1 is normal.  S2 is normal.  Regular rate and rhythm.  No

heaves, thrills, gallops, or rubs are noted.

ABDOMEN:  Soft and nontender.  Positive bowel sounds.

EXTREMITIES:  There is no clubbing, cyanosis, or edema.

NEUROLOGIC:  He is awake, alert, and responsive.  Moves all four

extremities.



LABORATORY AND DIAGNOSTIC DATA:  White count of 5.3, hemoglobin 13.6,

and platelet count of 213,000.  Sodium is 141, potassium 3.6, chloride

103, bicarbonate 24, BUN of 14, creatinine 0.7, and glucose of 107.

Alkaline phosphatase was 143.  Troponin less than 0.03.  Total cholesterol

195, LDL of 58, HDL of 114, and TSH of 1.01.  INR was 2.5 today.

Urinalysis was fairly unremarkable.  Tele is negative.  EKG was normal

sinus rhythm, normal QRS axis, some delay in R-wave progression, otherwise

fairly unremarkable.  Imaging modalities, carotid duplex has shown minimal

stenosis between 5 to 10% and he had a CT scan of the spine that showed no

acute injury identified, spondylosis was noted.  Wrist x-rays were no

degenerative changes.  Head CT was no acute intracranial bleed.



ASSESSMENT:

1. Syncope.

2. History of pulmonary embolism previously.

3. History of hepatitis B and questionable history of cirrhosis.



Dr. Dewey, this patient was seen in cardiac consultation.  It is

possible that he has had a syncopal episode because of being NPO according

to himself, although he states he usually drinks more water than he

usually drinks when he came into the hospital.  His neurological workup

appears to be negative and he has had an echocardiogram, preliminary

report shows ejection fraction of 55%, IVC of 2.3, PA pressures only 10.

His EKG is otherwise unremarkable.  He has not had any arrhythmias on

telemetry so far here, but he wants his orthostatic vitals checked just to

make sure that he is not orthostatic, otherwise no other workup may be

necessary in light of the fact that his preliminary workup so far has been

unremarkable.



Dr. Dewey, thank you for allowing me to participate in this patient

care.









  ______________________________________________

  Alcon Cheek M.D.





DR:  ERICH

D:  08/05/2017 16:36

T:  08/05/2017 20:28

JOB#:  [id number]

CC:

## 2017-08-06 NOTE — CARDIOLOGY PROGRESS NOTE
Assessment/Plan


Assessment/Plan


1. Syncope.


2. History of pulmonary embolism previously.


3. History of hepatitis B and questionable history of cirrhosis.


4. orthostatic hypotesnion 








has become much more debiliated than yest 


will need toreapt orthostatic if able to stand to amek sure not need fluid 

after today





Subjective


Cardiovascular:  Denies: chest pain, palpitations


Respiratory:  Denies: shortness of breath


Gastrointestinal/Abdominal:  Denies: abdominal pain


Genitourinary:  Denies: burning





Objective





Last 24 Hour Vital Signs








  Date Time  Temp Pulse Resp B/P Pulse Ox O2 Delivery O2 Flow Rate FiO2


 


8/6/17 16:02 97.7 91 21 141/86 95 Room Air  


 


8/6/17 12:00 98.3 90 20 131/86 93 Room Air  


 


8/6/17 10:00  95      





  93      





  96      


 


8/6/17 08:25  90 20   Room Air  


 


8/6/17 08:00 98.1 95 18 138/87 91 Room Air  


 


8/6/17 04:00 97.5 92 18 138/77 98 Room Air  


 


8/6/17 00:00 98.2 90 16 133/78 90 Room Air  


 


8/5/17 20:37 97.7 90 22 125/75 94 Room Air  


 


8/5/17 17:05  103      


 


8/5/17 17:00  98      


 


8/5/17 16:55  89      








General Appearance:  no apparent distress, alert


Cardiovascular:  normal rate, regular rhythm


Respiratory/Chest:  lungs clear, normal breath sounds


Abdomen:  normal bowel sounds, non tender, soft


Extremities:  no swelling











Intake and Output  


 


 8/5/17 8/6/17





 19:00 07:00


 


Intake Total 730 ml 300 ml


 


Output Total 400 ml 


 


Balance 330 ml 300 ml


 


  


 


Intake Oral 480 ml 


 


IV Total 250 ml 300 ml


 


Output Urine Total 400 ml 


 


# Voids 2 2











Laboratory Tests








Test


  8/6/17


07:29


 


Prothrombin Time


  20.4 SEC


(9.30-11.50)  H


 


Prothromb Time International


Ratio 1.9 (0.9-1.1)


H

















PAUL BECKETT Aug 6, 2017 16:46

## 2017-08-06 NOTE — PULMONOLOGY PROGRESS NOTE
Assessment/Plan


Assessment/Plan


ASSESSMENT


s/p fall 


syncopal episode, likely vasovagal 2 to dehydration and sleep deprivation 


dehydration  


chronic seizure disorder 


hx of PE, on a/coagulation therapy 


Migraines 


 


PLAN OF CARE   


[facility] floor 


CT head no acute IC pathology  


CT L spine no acute injury 


R wrist X ray no acute injury  


Carotid Duplex minimal plaque  bilateral carotid 


neuro follows 


per neuro syncope likely vasovagal in origin due to dehydration and sleep 

deprivation   


neuro workup negative 


IVF


Lipid panel stable  


seizure precautions 


continue Dilantin 





cardio eval appreciated 


troponin negative 


SR on tele 


BP stable 


ECHO with EF 55% and RVSO of 10   Coumadin resumed 


O2 HHN prn  


orthostatic VS x 1 now 


PT/OT 


fall precautions 


pain management 


 


dc today to [facility]  





case discussed and evaluated by supervising physician





Subjective


Allergies:  


Coded Allergies:  


     SHELLFISH DERIVED (Unverified  Allergy, Intermediate, ITCHING/HIVES, 7/25/ 14)


     IODINE (Unverified  Allergy, Unknown, 7/25/14)


Uncoded Allergies:  


     SPINACH (Allergy, Intermediate, SEVERE ITCHING/HIVES, 7/25/14)


Subjective


  


no chest pain, no SOB no dizziness, no blackouts    


no  seizure activity





Objective





Last 24 Hour Vital Signs








  Date Time  Temp Pulse Resp B/P Pulse Ox O2 Delivery O2 Flow Rate FiO2


 


8/6/17 10:00  95      





  93      





  96      


 


8/6/17 08:00 98.1 95 18 138/87 91 Room Air  


 


8/6/17 04:00 97.5 92 18 138/77 98 Room Air  


 


8/6/17 00:00 98.2 90 16 133/78 90 Room Air  


 


8/5/17 20:37 97.7 90 22 125/75 94 Room Air  


 


8/5/17 17:05  103      


 


8/5/17 17:00  98      


 


8/5/17 16:55  89      


 


8/5/17 16:00  98      


 


8/5/17 16:00 96.4 99 22 135/99 94 Room Air  


 


8/5/17 12:00 97.5 85 18 129/83 94 Room Air  


 


8/5/17 12:00  89      

















Intake and Output  


 


 8/5/17 8/6/17





 19:00 07:00


 


Intake Total 730 ml 300 ml


 


Output Total 400 ml 


 


Balance 330 ml 300 ml


 


  


 


Intake Oral 480 ml 


 


IV Total 250 ml 300 ml


 


Output Urine Total 400 ml 


 


# Voids 2 2








Objective


General Appearance:  no acute distress


HEENT:  normocephalic, atraumatic, anicteric, mucous membranes moist


Respiratory/Chest:  lungs clear, no respiratory distress, no accessory muscle 

use


Cardiovascular:  normal peripheral pulses, normal rate, no JVD


Abdomen:  normal bowel sounds, soft, non tender


Extremities:  no edema, pedal pulses normal


Neurologic/Psychiatric:  abnormal gait  with walker , alert, responsive, normal 

mood/affect


Musculoskeletal:  normal muscle bulk


Laboratory Tests


8/6/17 07:29: 


Prothrombin Time 20.4H, Prothromb Time International Ratio 1.9H





Current Medications








 Medications


  (Trade)  Dose


 Ordered  Sig/Eddie


 Route


 PRN Reason  Start Time


 Stop Time Status Last Admin


Dose Admin


 


 Acetaminophen


  (Tylenol)  650 mg  Q4H  PRN


 ORAL


 Fever/Headache/Mild Pain  8/6/17 00:15


 9/5/17 00:14  8/6/17 10:35


 


 


 Al Hydroxide/Mg


 Hydroxide


  (Mylanta II)  30 ml  Q6H  PRN


 ORAL


 dyspepsia  8/6/17 00:15


 9/5/17 00:14   


 


 


 Albuterol/


 Ipratropium


  (DuoNeb


 0.5-3(2.5)mg/3ml)  3 ml  Q4H  PRN


 HHN


 Shortness of Breath  8/6/17 00:15


 8/11/17 00:14   


 


 


 Amitriptyline HCl


  (Elavil)  75 mg  BEDTIME


 ORAL


   8/6/17 21:00


 9/5/17 20:59   


 


 


 Clonidine HCl


  (Catapres)  0.1 mg  Q4H  PRN


 ORAL


 SBP>160mmHg  8/6/17 00:15


 9/5/17 00:14   


 


 


 Dextrose


  (Dextrose 50%)    STAT  PRN


 IV


 Hypoglycemia  8/6/17 12:15


 9/5/17 12:14   


 


 


 Latanoprost


  (Xalatan)  1 drop  BEDTIME


 BOTH EYES


   8/6/17 21:00


 9/5/17 20:59   


 


 


 Lorazepam


  (Ativan 2mg/ml


 1ml)  0.5 mg  Q4H  PRN


 IV


 For Anxiety  8/6/17 00:15


 8/13/17 00:14   


 


 


 Morphine Sulfate


  (Morphine


 Sulfate)  1 mg  Q4H  PRN


 IVP


 For Pain 7-10  8/6/17 00:15


 8/13/17 00:14   


 


 


 Nitroglycerin


  (Ntg)  0.4 mg  Q5M X 3 DOSES PRN


 SL


 Prn Chest Pain  8/6/17 00:15


 9/5/17 00:14   


 


 


 Ondansetron HCl


  (Zofran)  4 mg  Q6H  PRN


 IVP


 Nausea & Vomiting  8/6/17 00:15


 9/5/17 00:14   


 


 


 Patient Own


 Medication


  (Patient's Own


 Med)  1 ea  TID


 ORAL


   8/6/17 09:00


 9/5/17 08:59  8/6/17 10:25


 


 


 Polyethylene


 Glycol


  (Miralax)  17 gm  HSPRN  PRN


 ORAL


 Constipation  8/6/17 21:00


 9/5/17 20:59   


 


 


 Primidone


  (Mysoline)  250 mg  Q8H


 ORAL


   8/6/17 05:00


 9/5/17 04:59  8/6/17 05:00


 


 


 Tamsulosin HCl


  (Flomax)  0.4 mg  BEDTIME


 ORAL


   8/6/17 21:00


 9/5/17 20:59   


 


 


 Temazepam


  (Restoril)  15 mg  HSPRN  PRN


 ORAL


 Insomnia  8/6/17 21:00


 8/13/17 20:59   


 


 


 Tolterodine


 Tartrate


  (Detrol)  2 mg  BID


 ORAL


   8/6/17 09:00


 9/5/17 08:59  8/6/17 10:25


 


 


 Warfarin Sodium


  (Coumadin per


 pharmacy)  1 ea  DAILY  PRN


 MISC


 Per rx protocol  8/6/17 09:00


 9/5/17 08:59   


 


 


 Warfarin Sodium/


 Warfarin Sodium


  (Coumadin/


 Coumadin)  7 mg  COUMADIN  ONCE


 ORAL


   8/6/17 17:00


 8/6/17 17:01   


 

















Familia MoultonCayuga Medical CenterClary Eddy NP Aug 6, 2017 11:24

## 2017-08-06 NOTE — CARDIOLOGY REPORT
--------------- APPROVED REPORT --------------





EKG Measurement

Heart [id number]

PA [id number]

[id number]

[id number]

[id number]





Normal sinus rhythm

Cannot rule out Anterior infarct, age undetermined

Abnormal ECG

## 2017-08-06 NOTE — CARDIOLOGY PROGRESS NOTE
Assessment/Plan


Assessment/Plan


1. Syncope.


2. History of pulmonary embolism previously.


3. History of hepatitis B and questionable history of cirrhosis.


4. orthostatic hypotesnion 








has become much more debiliated than yest 


will need toreapt orthostatic if able to stand to amek sure not need fluid 

after today





Objective





Last 24 Hour Vital Signs








  Date Time  Temp Pulse Resp B/P Pulse Ox O2 Delivery O2 Flow Rate FiO2


 


8/6/17 16:02 97.7 91 21 141/86 95 Room Air  


 


8/6/17 12:00 98.3 90 20 131/86 93 Room Air  


 


8/6/17 10:00  95      





  93      





  96      


 


8/6/17 08:25  90 20   Room Air  


 


8/6/17 08:00 98.1 95 18 138/87 91 Room Air  


 


8/6/17 04:00 97.5 92 18 138/77 98 Room Air  


 


8/6/17 00:00 98.2 90 16 133/78 90 Room Air  


 


8/5/17 20:37 97.7 90 22 125/75 94 Room Air  


 


8/5/17 17:05  103      


 


8/5/17 17:00  98      

















Intake and Output  


 


 8/5/17 8/6/17





 19:00 07:00


 


Intake Total 730 ml 300 ml


 


Output Total 400 ml 


 


Balance 330 ml 300 ml


 


  


 


Intake Oral 480 ml 


 


IV Total 250 ml 300 ml


 


Output Urine Total 400 ml 


 


# Voids 2 2











Laboratory Tests








Test


  8/6/17


07:29


 


Prothrombin Time


  20.4 SEC


(9.30-11.50)  H


 


Prothromb Time International


Ratio 1.9 (0.9-1.1)


H

















PAUL BECKETT Aug 6, 2017 16:56

## 2017-08-07 NOTE — CARDIOLOGY REPORT
--------------- APPROVED REPORT --------------





EXAM: Two-dimensional and M-mode echocardiogram with Doppler and color 

Doppler.



INDICATION

Left Ventricular Function



M-Mode DIMENSIONS 

IVSd0.8 (0.7-1.1cm)Left Atrium (MM)3.0 (1.6-4.0cm)

LVDd4.8 (3.5-5.6cm)Aortic Root2.7 (2.0-3.7cm)

PWd0.8 (0.7-1.1cm)Aortic Cusp Exc.1.9 (1.5-2.0cm)



LVDs2.7 (2.5-4.0cm)

PWs1.3 cm





Normal left ventricular chamber size, systolic function and wall motion.

Left ventricular ejection fraction estimated to be 55 %.

No evidence of ventricular hypertrophy.

Anterior Echo-free space, may be due to pericardial fat or effusion.

All other cardiac chamber sizes are within normal limits.

Focal aortic valve sclerosis with adequate cusp excursion.

Thickened mitral valve leaflets with normal excursion.

Mitral annulus and aortic root calcification.

Pulmonic valve not well visualized.

Normal tricuspid valve structure.

IVC dilated at 2.3cm with physiologic collapse.



A  color flow and spectral Doppler study was performed and revealed:

Mild aortic regurgitation.

No mitral regurgitation.

Mitral diastolic velocities suggest reduced left ventricular relaxation (Grade I).

Trace tricuspid regurgitation.

Tricuspid systolic velocities suggests peak right ventricular systolic pressure of 10 

mmHg. 

No pulmonic regurgitation present.

## 2017-08-08 NOTE — DISCHARGE SUMMARY
Discharge Summary


Hospital Course


Date of Admission


Aug 3, 2017 at 09:30


Date of Discharge


Aug 7, 2017 at 18:39


Admitting Diagnosis


head injury


HPI


Cody Garcia is a 67 year old male who was admitted on Aug 3, 2017 at 09:30 

for Head Injury


Hospital Course


dc summary #[id number]





Discharge Medications


Continued Medications:  


Amitriptyline Hcl* (Amitriptyline Hcl*) 10 Mg Tablet


75 MG ORAL BEDTIME, TAB





Lansoprazole* (Lansoprazole*) 30 Mg Capsule.dr


30 MG ORAL DAILY, CAP





Latanoprost* (Xalatan*) 2.5 Ml Drops


1 DROP BOTH EYES BEDTIME, ML 0 Refills





Phenytoin Sodium Extended* (Dilantin*) 100 Mg Capsule


100 MG ORAL THREE TIMES A DAY, #90 CAP 0 Refills





Primidone* (Mysoline*) 250 Mg Tablet


250 MG ORAL THREE TIMES A DAY, #90 TAB





Tamsulosin Hcl (Tamsulosin Hcl*) 0.4 Mg Cap.er.24h


0.4 MG ORAL BEDTIME, CAP





Tolterodine Tartrate (Tolterodine Tartrate) 1 Mg Tablet


4 MG PO DAILY, TAB





Warfarin Sod* (Warfarin Sod*) 7.5 Mg Tablet


7.5 MG ORAL DAILY, TAB











Discharge


Discharge Disposition


Patient was discharged to Home (01)


Discharge Diagnoses:  











Bravo (Walter)Clary NP Aug 8, 2017 12:21

## 2017-08-09 NOTE — DISCHARGE SUMMARY 2 SIG
DATE OF ADMISSION:  08/03/2017



DATE OF DISCHARGE:  08/07/2017



REASON FOR ADMISSION:  This is a 67-year-old male with a history of

recent pulmonary emboli, seizure disorder, COPD, reported recent fall and

came to emergency room due to the increased pain in the head, right wrist,

and the low back.  The patient reported that he did not recall hitting the

floor, he is a poor historian.  He was admitted for possible syncopal

episode.



ADMITTING DIAGNOSES:

1. Syncopal episode.

2. Pulmonary emboli, on anticoagulation.

3. Seizure disorder.

4. Status post fall.



HOSPITAL STAY:  The patient is admitted to telemetry floor.

Cardiology and Neurology consult were requested to rule out cardiac or

neural causes of syncope.  CT of the head revealed no acute intracranial

pathology.  CT of the lumbar spine revealed no acute injury.  X-ray of the

right wrist also revealed no acute injury.  Carotid duplex revealed

minimal plaque bilateral carotid, _____ follows.  Per Neurology, syncope

likely vasovagal in origin due to dehydration and sleep deprivation as

reported by the patient.  Neurologic workup was negative.  The patient was

on seizure precaution.  Continue Dilantin and Mysoline and no seizure

activity while in the hospital.  Lipid panel stable.  The patient was on

the IV fluids.  Neurology cleared the patient for discharge.  The patient

was working with physical and occupational therapy.  Fall precaution

maintained.  Cardiologist seen the patient.  Troponin negative.  Telemetry

strip revealed sinus rhythm.  Blood pressure was stable.  Echocardiogram

revealed preserved ejection fraction of 55%, right ventricular systolic

pressure of 10.  Coumadin resumed.  Supplemental oxygen provided as needed

to keep saturation above 92%.  Pulmonary toilet provided as needed.

Orthostatic vital signs revealed orthostatic hypotension.  More fluid

given to the patient.  Pain management provided.  The patient was planned

initially to be discharged to skilled nursing facility for short

rehabilitation, however, was unable to arrange due to the insurance

constraint.  The patient was discharged home.  The patient had a caregiver

living in.  INR therapeutic.  Pain controlled.  The patient stable for

discharge.



DISCHARGE DIAGNOSES:

1. Status post fall.

2. Syncopal episode likely vasovagal secondary to dehydration and sleep

deprivation.

3. Dehydration.

4. Orthostatic hypotension related to dehydration.

5. Chronic seizure disorder, partial complex.

6. History of pulmonary emboli, on anticoagulation therapy.

7. Migraines.

8. Mild anemia.  Of note, hemoglobin and hematocrit remained stable,

hemoglobin 13.6 and hematocrit 40.4.







  ______________________________________________

  Surekha Dewey M.D.



I have been assigned to dictate discharge summary on this account and I

was not involved in the patient's management.



  ______________________________________________

  Clary fuentesMELITON morales





DR:  THIERNO

D:  08/08/2017 12:20

T:  08/09/2017 08:34

JOB#:  [id number]

CC:

## 2017-09-11 NOTE — IMMEDIATE POST-OP EVALUATION
Immediate Post-Op Evalulation


Immediate Post-Op Evalulation


Procedure:  L eye cataract extraction wth IOL


Date of Evaluation:  Sep 11, 2017


Time of Evaluation:  08:22


IV Fluids:  20


Blood Products:  none


Estimated Blood Loss:  none


Urinary Output:  none


Blood Pressure Systolic:  148


Blood Pressure Diastolic:  74


Pulse Rate:  65


Respiratory Rate:  20


O2 Sat by Pulse Oximetry:  99


Temperature (Fahrenheit):  97.6


Pain Score (1-10):  1


Nausea:  No


Vomiting:  No


Complications


none


Patient Status:  awake, patent, none


Hydration Status:  adequate











YE MUNOZ M.D. Sep 11, 2017 08:52

## 2017-09-11 NOTE — OPERATIVE NOTE - PDOC
Operative Note


Operative Note


Date of Operation/Procedure:  Sep 11, 2017


Chief Complaint:  Poor vision, left eye


Pre-op Diagnosis:


Cataract


Procedure:


Cataract extraction with intraocular lens


Post-op Diagnosis:


Cataract


Post-op Diagnosis:  same as pre-op


Surgeon:  Yessy


Assistant:  None


Additional Surgeons:  Tristan


Anesthesiologist:  Jason


Anesthesia:  MAC


Specimen:  none


Complications:  none


Condition:  stable


Estimated Blood Loss:  none


Drains:  none


Implant(s) used?:  Yes


Indications for Procedure


Poor vision


Description of Procedure


See dictated report











GIANNI PERALTA Sep 11, 2017 08:22

## 2017-09-11 NOTE — PRE-PROCEDURE NOTE/ATTESTATION
Pre-Procedure Note/Attestation


Complete Prior to Procedure


Planned Procedure:  left


Procedure Narrative:


Cataract extraction with intraocular lens implant





Indications for Procedure


Pre-Operative Diagnosis:


Cataract





Attestation


I attest that I discussed the nature of the procedure; its benefits; risks and 

complications; and alternatives (and the risks and benefits of such alternatives

), prior to the procedure, with the patient (or the patient's legal 

representative).





I attest that, if there was a reasonable possibility of needing a blood 

transfusion, the patient (or the patient's legal representative) was given the 

[facility] standardized written summary, pursuant 

to the [place] Dawn Blood Safety Act ([place] Health and Safety Code # 1645, as 

amended).





I attest that I re-evaluated the patient just prior to the surgery and that 

there has been no change in the patient's H&P, except as documented below:











GIANNI PERALTA Sep 11, 2017 07:21

## 2017-09-11 NOTE — ANETHESIA PREOPERATIVE EVAL
Anesthesia Pre-op PMH/ROS


General


Date of Evaluation:  Sep 11, 2017


Time of Evaluation:  07:08


Anesthesiologist:  Jason


ASA Score:  ASA 3


Mallampati Score


Class I : Soft palate, uvula, fauces, pillars visible


Class II: Soft palate, uvula, fauces visible


Class III: Soft palate, base of uvula visible


Class IV: Only hard plate visible


Mallampati Classification:  Class III


Surgeon:  Yessy


Diagnosis:  L eye cataract


Surgical Procedure:  L eye cataract extraction


Anesthesia History:  none


Family History:  no anesthesia problems


Allergies:  


Coded Allergies:  


     SHELLFISH DERIVED (Unverified  Allergy, Intermediate, ITCHING/HIVES, 7/25/ 14)


     IODINE (Unverified  Allergy, Unknown, 7/25/14)


Uncoded Allergies:  


     SPINACH (Allergy, Intermediate, SEVERE ITCHING/HIVES, 7/25/14)





Past Medical History


Cardiovascular:  Reports: HTN, 


   Denies: CAD, MI, valve dz, arrhythmia, other


Pulmonary:  Denies: asthma, COPD, ADEOLA, other


Gastrointestinal/Genitourinary:  Reports: GERD, 


   Denies: CRI, ESRD, other


Neurologic/Psychiatric:  Reports: depression/anxiety, other - seizers, 


   Denies: dementia, CVA, TIA


Endocrine:  Denies: DM, hypothyroidism, steroids, other


HEENT:  Reports: cataract (L), cataract (R), glaucoma, 


   Denies: Jena (L), Jena (R), other


Hematology/Immune:  Reports: DVT - H/o PE on coumadin, 


   Denies: anemia, bleeding disorder, other


Musculoskeletal/Integumentary:  Denies: OA, RA, DJD, DDD, edema, other


PMH Narrative:


as above


PSxH Narrative:


hip replacement, R eye cataract





Anesthesia Pre-op Phys. Exam


Physician Exam





Last Vital Signs








  Date Time  Temp Pulse Resp B/P (MAP) Pulse Ox O2 Delivery O2 Flow Rate FiO2


 


9/11/17 06:19 97.4 79 19 119/80 93 Room Air  








Constitutional:  NAD


Neurologic:  CN 2-12 intact


Cardiovascular:  RRR, no M/R/G


Respiratory:  CTA


Gastrointestinal:  S/NT/ND





Airway Exam


Mallampati Score:  Class III


MO:  limited


Neck:  stiff


ROM:  limited


Teeth:  missing


Dentures:  no upper, no lower





Anesthesia Pre-op A/P


Labs





Chemistry








Test


  9/11/17


06:30


 


Sodium Level Pending  


 


Potassium Level Pending  


 


Chloride Level Pending  


 


Carbon Dioxide Level Pending  


 


Blood Urea Nitrogen Pending  


 


Creatinine Pending  


 


Estimat Glomerular Filtration


Rate Pending  


 


 


Glucose Level Pending  


 


Calcium Level Pending  











Studies


Pre-op Studies:  EKG - NSR





Risk Assessment & Plan


Assessment:


ASA 3


Plan:


MAC


Status Change Before Surgery:  No





Pre-Antibiotics


Drug:  none











YE MUNOZ M.D. Sep 11, 2017 07:11

## 2017-09-11 NOTE — 48 HOUR POST ANESTHESIA EVAL
Post Anesthesia Evaluation


Procedure:  L eye cataract extraction [facility] IOL


Date of Evaluation:  Sep 11, 2017


Time of Evaluation:  09:00


Blood Pressure Systolic:  132


0:  72


Pulse Rate:  64


Respiratory Rate:  20


Temperature (Fahrenheit):  97.6


O2 Sat by Pulse Oximetry:  98


Airway:  patent


Nausea:  No


Vomiting:  No


Pain Intensity:  1


Hydration Status:  adequate


Cardiopulmonary Status:


stable


Mental Status/LOC:  patient returned to baseline


Follow-up Care/Observations:


n/a


Post-Anesthesia Complications:


none


Follow-up care needed:  ready to discharge











YE MUNOZ M.D. Sep 11, 2017 09:01

## 2017-09-12 NOTE — OPERATIVE NOTE - DICTATED
DATE OF OPERATION:  09/11/2017



SURGEON:  Robert Krishnamurthy M.D.



ASSISTANT:  None.



ANESTHESIA:  MAC.



ANESTHESIOLOGIST:  Cam Gomez M.D.



PREOPERATIVE DIAGNOSES:

1. Combined mechanism, age-related cataract, left eye.

2. Pseudophakia, right eye.

3. Glaucoma.



POSTOPERATIVE DIAGNOSES:

1. Combined mechanism, age-related cataract, left eye.

2. Pseudophakia, right eye.

3. Glaucoma.



Principal Procedure:  Phacoemulsification with primary implantation of

posterior chamber intraocular lens, left eye, complex.



Description of Procedure:  The patient has been evaluated in my office for

few years with history of chronic open-angle glaucoma and bilateral

cataracts.  His glaucoma has been controlled with topical drops.  His

visual acuity gradually diminished due to increasing cataract.  He

underwent successful cataract extraction in his right eye five months ago.

He has been complaining of comparatively poor vision in the left eye.  I

have discussed the risks, benefits, and alternatives, informed consent was

obtained to proceed with cataract extraction in the left eye.



The patient was therefore brought to the [facility]

outpatient surgery unit where the left pupil was dilated and an

intravenous line was started.  He was brought into the operating room

where ocular anesthesia was obtained with topical drops supplemented with

intravenous sedation.  The left eye was then prepped and draped in the

usual fashion per intraocular surgery.



A clear corneal temporal incision was made in the anterior chamber

filled with nonpreserved lidocaine and viscoelastic.  The pupil did not

dilate well.  A 6.25 mm Malyugin ring was carefully positioned.  An

anterior capsulotomy was accomplished.  Hydrodissection was utilized to

facilitate phacoemulsification.   A two-handed phacoemulsification

technique was used to fracture the lens nucleus into quadrants and the

segments removed in sequence.  Irrigation/aspiration hand piece was then

used to remove all residual lens cortex and polish the posterior capsule.

 



Viscoelastic was introduced.  An intraocular lens from [employer], model

ZCB00 with a power of 18 diopters, was folded into the lens insertion

cartridge, injected into the posterior chamber of the eye and positioned

within the capsular bag.  The Malyugin ring was removed.  The viscoelastic

was removed.  Miochol was irrigated to restore the pupil anatomy.  Due to

iris floppiness, a single 10-0 nylon suture was used to secure the wound.

The knot was buried internally.   Wound was tested and found to be

watertight.  The intraocular pressure was adjusted to normal.



One drop of 10% Betadine, one drop of prednisolone acetate, and one drop

of ciprofloxacin were placed on the eye.  The eye was covered in an eye

shield.  This completed the procedure.  All sponge and needle counts were

correct.  The patient was taken to the PAR in good condition having

tolerated the procedure well.









  ______________________________________________

  Robert Krishnamurthy M.D.





DR:  JM/PM

D:  09/11/2017 08:49

T:  09/11/2017 22:22

JOB#:  [id number]

CC:  Radha Monreal M.D.; Fax#:  [phone]

## 2020-03-30 NOTE — DIAGNOSTIC IMAGING REPORT
Indications: History of seizures and frequent falls. Head trauma 3 days ago. Recent

headache from fall

 

Technique: Spiral acquisitions obtained through the brain. Angled axial and coronal 5

x 5 mm slices were reconstructed. Total dose length product 1149 mGycm.  CTDI vol(s)

53 mGy. Dose reduction achieved using automated exposure control

 

Comparison: 8/3/2017

 

Findings: There is age-related enlargement of the ventricles and extra axial CSF

spaces. There is fairly extensive periventricular deep white matter low-attenuation,

consistent with chronic microvascular ischemic change and progressive since the prior

study.. There is encephalomalacia of the right temporal tip. This is a new finding.

No acute intracranial hemorrhage or edema. No mass effect nor midline shift.

 

Impression: Negative for acute intracranial bleed or mass effect

 

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

of 8/30/2017

 

Other chronic and age-related changes, as described

 

This agrees with the preliminary interpretation provided overnight by [facility]

teleradiology service.

 

The CT scanner at [facility] is accredited by the American College of

Radiology and the scans are performed using protocols designed to limit radiation

exposure to as low as reasonably achievable to attain images of sufficient resolution

adequate for diagnostic evaluation.

## 2020-03-30 NOTE — NUR
ED Nurse Note:



Pt brought into ED by [facility] ambulance unit 401 for c/o fall PTA. Pt states 
he fell once at 2200 tonight where he couldn't get himself up and had a 
previous fall this afternoon as well. Pt states fall is mechanical in nature 
and denies losing consciousness or becoming dizzy. Pt states falls have become 
more frequent over the last few months and has had multiple falls over the past 
couple of days. Pt reports hitting his head on wood/metal object. Pt denies any 
n/v, fever or chills. Pt states he has had diarrhea since last night. Pt is 
aaox4, breathing is normal and unlabored. Pt connected to cardiac monitor. 
Safety measures in place. Will continue to monitor pt.

## 2020-03-30 NOTE — EMERGENCY ROOM REPORT
History of Present Illness


General


Chief Complaint:  Multiple Trauma/Fall


Source:  Patient





Present Illness


HPI


This is a 70-year-old male with a history of COPD and seizure.  His seizure is 

been well controlled on Dilantin.  No seizure activity for over 30 years.  He 

presents with chief complaint of frequent falls.  This been ongoing for the 

last 6 months.  But more frequent in the last few days.  He said he fell 3 days 

ago hit his head.  He fell twice today.  Unable to get up.  1 time a trip 

today.  The other time he got up and sat up off the bed and unable to get up 

for an hour.  Patient denies any pain.  Did have a headache couple days ago 

from the fall but resolved now.  No focal deficit.  No nausea no vomiting.  He 

does have some diarrhea started last night.  Denies any fever chills but denies 

any cough or shortness of breath.  No recent travel.


Allergies:  


Coded Allergies:  


     SHELLFISH DERIVED (Unverified  Allergy, Intermediate, ITCHING/HIVES, 7/25/ 14)


     IODINE (Unverified  Allergy, Unknown, 7/25/14)


Uncoded Allergies:  


     SPINACH (Allergy, Intermediate, SEVERE ITCHING/HIVES, 7/25/14)





COVID-19 Screening


Contact w/high risk pt:  No


Recent Travel to affected area:  No


Experienced COVID-19 symptoms?:  No





Patient History


Past Medical History:  COPD, seizures


Past Surgical History:  other - Hip surgery, left





Nursing Documentation-[facility]


Past Medical History:  No History, Except For


Hx Cardiac Problems:  Yes - cardiopulmonary emboli-2014, hepatitis B


Hx COPD:  Yes


Hx Cancer:  Yes - BASAL CELL CARCINOMA SKIN


Hx Gastrointestinal Problems:  Yes


Hx Neurological Problems:  Yes


Hx Seizures:  Yes - 30 YEARS AGO


Hx Epilepsy:  Yes - Well controlled with medications - last seizure 35 yrs ago


Hx Syncope:  Yes - THIS AM


Hx Headaches:  Yes - MIGRAINES


Hx Numbness:  Yes - TIPS OF THE TOES





Review of Systems


Eye:  Denies: eye pain, blurred vision


ENT:  Denies: ear pain, nose congestion, throat swelling


Respiratory:  Denies: cough, shortness of breath


Cardiovascular:  Denies: chest pain, palpitations


Gastrointestinal:  Reports: diarrhea; Denies: abdominal pain, nausea, vomiting


Musculoskeletal:  Denies: back pain, joint pain


Skin:  Denies: rash


Neurological:  Denies: headache, numbness


Endocrine:  Denies: increased thirst, increased urine


Hematologic/Lymphatic:  Denies: easy bruising


All Other Systems:  negative except mentioned in HPI





Physical Exam





Vital Signs








  Date Time  Temp Pulse Resp B/P (MAP) Pulse Ox O2 Delivery O2 Flow Rate FiO2


 


3/30/20 22:44 98.8 88 16 150/83 (105) 92 Room Air  





Vitals with high blood pressure


Sp02 EP Interpretation:  reviewed, normal


General Appearance:  well appearing, no apparent distress, alert


Head:  normocephalic, atraumatic


Eyes:  bilateral eye PERRL, bilateral eye EOMI


ENT:  hearing grossly normal, normal pharynx


Neck:  full range of motion, supple, no meningismus


Respiratory:  chest non-tender, lungs clear, normal breath sounds


Cardiovascular #1:  regular rate, rhythm, no murmur


Gastrointestinal:  normal bowel sounds, non tender, no mass, no organomegaly, 

no bruit, non-distended


Musculoskeletal:  back normal, normal range of motion, other - Left leg appear 

to be shorter than right


Psychiatric:  mood/affect normal





Medical Decision Making


Diagnostic Impression:  


 Primary Impression:  


 Multiple injuries due to trauma


 Additional Impressions:  


 Head injury, acute


 Qualified Codes:  S09.90XA - Unspecified injury of head, initial encounter


 Failure to thrive in adult


ER Course


Patient presents with frequent falls and head injury.  No evidence of any 

intracranial bleed or fractures.  He is unable to ambulate.  He lives by 

himself.  For this reason, will admit for rehab and physical therapy.  I 

discussed the case with Dr. Padron who will admit.


Other X-Ray Diagnostic Results


Other X-Ray Diagnostic Results :  


   X-Ray ordered:  Left hip x-rays


   # of Views/Limited Vs Complete:  4 View


   Indication:  Pain


   EP Interpretation:  Yes


   Interpretation:  no dislocation, no soft tissue swelling, no fractures, 

other - Previous left hip surgery


   Impression:  No acute disease


   Electronically Signed by:  Real Cheung MD





CT/MRI/US Diagnostic Results


CT/MRI/US Diagnostic Results :  


   Imaging Test Ordered:  CT head


   Impression


No acute process per radiologist





Last Vital Signs








  Date Time  Temp Pulse Resp B/P (MAP) Pulse Ox O2 Delivery O2 Flow Rate FiO2


 


3/30/20 22:44 98.8 88 16 150/83 (105) 92 Room Air  








Status:  improved


Disposition:  ADMITTED AS INPATIENT


Condition:  Serious











Real Cheung MD Mar 30, 2020 22:54

## 2020-03-31 NOTE — NUR
NURSE NOTES

Received patient from ED via [facility]. Alert and oriented x4. Saturating 89% on room air. 
Spoke with Dr. Padron and obtained admission orders. Put pt on nasal cannula @ 3LPM, 
saturating 94%. Oriented to room, Instructed to use call light for assistance. Call light in 
reach. Bed in lowest, lock engaged and alarm on. Pt has some meds in pillbox. Will send to 
the pharmacy. Called supervisor for pt's valuables to send to lock box. Supervisor was not 
available. Will endorse to AM. Belongings checked and listed on the paper chart.

## 2020-03-31 NOTE — NUR
*-* INSURANCE *-*



ALL AVAILABLE CLINICALS HAVE BEEN FAXED TO: 



[facility]

P: 

F: [phone] (FAX ALL CLINICALS)

## 2020-03-31 NOTE — DIAGNOSTIC IMAGING REPORT
Indication: Shortness of breath

 

Technique: One view of the chest

 

Comparison: 8/20/2014

 

Findings: Subsegmental atelectasis is seen at both lung bases. The lungs are somewhat

hyperinflated, particularly the upper lobes. The lungs and pleural spaces are

otherwise clear. The heart size is normal.

 

Impression: Bilateral basilar atelectasis

 

COPD changes

 

No acute process otherwise

## 2020-03-31 NOTE — HISTORY & PHYSICAL
History and Physical


History & Physicial


Dictated for Int Med-DR Padron no. [id number].











Dominic Temple MD Mar 31, 2020 16:57

## 2020-03-31 NOTE — NUR
CASE MANAGEMENT: INITIAL REVIEW



70YR OLD MALE BIBA FROM HOME 



CC: MULTIPLE TRAMA/FALL; DIARRHEA; HIT HEAD 3DAYS AGO



HX: COPD AND SEIZURE



SI:MULTIPLE INJURIES DUE TO TRAMA . ACUTE HEAD INJURY .  FAILURE TO THRIVE

98.8   88  16   150/83   92% ON RA

 NA+146 ANION GAP 16 BGLU 130 CA+ 10.2 ALKP 220      H/H 13.9/41.3 





IS:IVF NS BOLUS X1

HIP X-RAY-No acute bony trauma

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 
study of 8/30/2017

CHEST X-RAY-Bilateral basilar atelectasis



\**: [facility] 

DCP:HOME WHEN STABLE 

PLAN:

PT EVAL 

REHAB CONSULT 









CASE MANAGEMENT: REVIEW



03/31/20



HX: COPD AND SEIZURE



SI:MULTIPLE INJURIES DUE TO TRAMA . ACUTE HEAD INJURY .  FAILURE TO THRIVE

98.4   90    18   161/92   97% ON 3L NC

 H/H 14/39.2    BGLU 123 AST 50 ALKP 185





IS:IV D5@75ML/HR

DILANTIN PO QD

MYSOLINE PO QD

HEPARIN SQ TID

PROTONIX PO QD

CLONIDINE Q4HR/PRN 



\**: [facility] 

DCP:HOME WHEN STABLE 



PLAN:

PT EVAL 

REHAB CONSULT 

CALORIE COUNT 

OXYGEN THERAPY - DECREASE SAT

-------------------------------------------------------------------------------

Addendum: 03/31/20 at 1331 by JAJA CROWLEY LVN

-------------------------------------------------------------------------------

****INTERQUAL MET OBSERVATION *****

## 2020-03-31 NOTE — DIAGNOSTIC IMAGING REPORT
Indication: Trauma, frequent falls, unable to get up

 

Technique: One view the pelvis, 2 views of the left hip

 

Comparison: none 

 

Findings: Compression screw and long medullary incko are seen reducing old healed left

intertrochanteric fracture. The hardware appears intact. There is no evidence of

acute fracture. No dislocations. The joint spaces are preserved.

 

Impression: Postsurgical and posttraumatic changes, as described

 

No acute bony trauma

## 2020-03-31 NOTE — NUR
ED Nurse Note:



Pt stabe for transfer to [facility] unit at this time. Pt is aaox4, vss, no acute 
distress noted. Pt taken to unit via gurney by tech. Pt belongings sent with 
pt.

## 2020-03-31 NOTE — NUR
P.T Note: 

P.T evaluation completed and  tx initiated. Please refer to P.T evaluation for current 
functional status. Pt is alert, O x4, pleasant and cooperative. Pt presented pain on R lower 
flank and generalized weakness affecting his balance and  overall mobility independence and 
safety. Pt currently requires MIN A X 1 for bed mobility  and transfers activity especially 
for  gait/ambulation activities due to unsteady gait. Pt is high fall risk category where 
require SNF for further rehab intervention VS going home with P.T follow up at [facility].

Pt however is cleared for OOB activities with nursing assist and using FWW.

Thank you for this referral.

## 2020-03-31 NOTE — NUR
NURSE NOTES:

Received pt in bed, AAO x 4. On NC 3L/min. No c/o pain/distress. IV on RAC 20g noted, 
running D5 1/2 NS @ 75 ml/hr. Patient is high risk for fall due to hx of fall. Educated pt 
to press call light for assistance. Call light within reach. Bed in the lowest, locked, and 
alarm on. Side rails padded for seizure precaution. Will continue to monitor

## 2020-03-31 NOTE — HISTORY AND PHYSICAL REPORT
DATE OF ADMISSION:  03/30/2020

CHIEF COMPLAINT:  The patient is a 70-year-old  male, who presents

with chief complaint of fall injury.



HISTORY OF PRESENT ILLNESS:  The patient states that he has a history of

seizure disorder.  The patient states his last seizure was 30 years ago.

The patient states his seizure disorder is controlled on Dilantin.



The patient states he has been having increasing falls over the past

week.  The patient states he fell twice yesterday, March 30, 2020.  The

patient was reaching for something and fell over.  The patient usually

walks with a walker.  The patient states he feels unsteady on his feet.



The patient presented to [facility] emergency room.  The patient is

admitted with multiple falls to rule out acute cerebrovascular accident.



REVIEW OF SYSTEMS:  CONSTITUTIONAL:  The patient denies weight loss or

weight gain.  The patient denies fevers or chills.  HEENT:  The patient

denies ear or throat pain.  The patient denies headache.  CARDIOVASCULAR:

The patient denies palpitations or chest pain.  CHEST:  The patient denies

wheeze or shortness of breath.  ABDOMEN:  The patient denies nausea,

vomiting, diarrhea, or constipation.  GENITOURINARY:  The patient denies

dysuria or increased frequency of urination.  NEUROMUSCULAR:  The patient

complains of ataxia as above.  The patient denies generalized weakness.



PAST MEDICAL HISTORY:  Significant for:



1. Deep venous thrombosis in 2014.

2. History of pulmonary embolism in 2014.

3. Seizure disorder.

4. Benign prostatic hypertrophy.

5. Chronic obstructive pulmonary disease.



PAST SURGICAL HISTORY:  Significant for:



1. Cataract surgery bilaterally.

2. Left femur intertrochanteric open reduction and internal fixation in

2014.



CURRENT MEDICATIONS:

1. Amitriptyline 75 mg p.o. nightly.

2. Lansoprazole 30 mg p.o. daily.

3. Xalatan one drop to both eyes nightly.

4. Dilantin 100 mg p.o. three times daily.

5. Mysoline 250 mg p.o. three times daily.

6. Tamsulosin 0.4 mg p.o. daily.

7. Tolterodine 4 mg p.o. daily.

8. Coumadin 7.5 mg p.o. daily.



ALLERGIES:  To contrast iodine.



SOCIAL HISTORY:  The patient is single and lives alone.  The patient denies

alcohol use.  The patient admits to previous tobacco use of one pack per

day, however, he quit in 2014.



PHYSICAL EXAMINATION:

VITAL SIGNS:  Temperature 98.8, respirations 16, pulse 88, and blood

pressure 150/83.

GENERAL:  The patient is a well-developed and well-nourished 

male, in no apparent distress.

HEENT:  Eyes, pupils are equal and responsive to light and accommodation.

Extraocular movements are intact.

NECK:  Supple without lymphadenopathy.

CHEST:  Lungs are clear to auscultation bilaterally without wheeze or

rales.

CARDIOVASCULAR:  Regular rate.  S1, S2 are normal without murmurs, rubs, or

gallops.

ABDOMEN:  Soft, nontender, and nondistended.  Positive bowel sounds.  No

evidence of hepatosplenomegaly.  Currently, no rebound or guarding

noted.

EXTREMITIES:  Negative for clubbing, cyanosis, or edema.

RECTAL/GENITAL:  Refused.

NEUROLOGIC:  Cranial nerves II through XII are grossly intact without focal

deficits.  Motor strength is 5/5 bilaterally.  Deep tendon reflexes are 2+

plantar.



LABORATORY STUDIES:  WBC 9.3, hemoglobin 13.9, hematocrit 41.3, and

platelets 185,000.  Sodium 146, potassium 4.2, chloride 103, CO2 27, BUN

18, and creatinine 0.9.  Glucose 130.



CT scan of the brain revealed probable old cerebrovascular accident in

the right temporal region, otherwise no acute disease.



An x-ray of the left hip failed to demonstrate acute fracture.



ASSESSMENT:  This is a 70-year-old _____ male with:



1. Frequent falls.

2. Seizure disorder.

3. Benign prostatic hypertrophy.

4. Chronic obstructive pulmonary disease.



TREATMENT:

1. Fall injury.  This may be secondary to orthostatic hypotension versus

acute cerebrovascular accident.  Carotid duplex Dopplers of the brain are

pending.

2. Seizure disorder.  Continue Dilantin as above.

3. History of deep venous thrombosis/pulmonary embolism.  Continue

Coumadin as above.

4. Cerebrovascular disease.  The patient denies history of stroke.

Continue aspirin as above.

5. Benign prostatic hypertrophy.  Continue Flomax as above.

6. Chronic obstructive pulmonary disease.  A Pulmonary consultation has

been obtained with Dr. Surekha Dewey.









  ______________________________________________

  Dominic Temple M.D.





DR:  DOMINGA

D:  03/31/2020 16:59

T:  03/31/2020 17:13

JOB#:  [id number]

CC:

## 2020-03-31 NOTE — NUR
NURSE NOTES:

Patient awake in bed, no complaints of pain at this time. Will attend to needs. Instructed 
to use call light for assistance. Call light in reach. Bed in lowest, lock engaged and alarm 
on. Will continue plan of care.

## 2020-04-01 NOTE — NUR
NURSE NOTES:

Received patient in bed, awake,not in acute respiratory/cardiac distress @ this time. 
Patient is alert and oriented x3 but forgetful.Patient denies pain or discomfort. Patient is 
high risk of fall. Bed is in lowest position and locked. Call light within reach, bed alarm 
is on zone 2.Patient refused to wear gown @ this time. Yellow gown @ the bedside. Yellow 
socks on. Reminded patient to call nurses if needed. Patient know how to use call light and 
demonstration done. Will continue plan of care.

## 2020-04-01 NOTE — NUR
NURSE NOTES:

Patient is in bed, awake and alert x3, forgetful. No signs of distress or SOB. IV intact and 
patent. Bed locked and in lowest position. Bed alarm activated. Fall precautions in place. 
Walker at bedside. Call light within reach. Reminded patient to call for assistance if 
needed. Will continue plan of care.

## 2020-04-01 NOTE — PULMONOLOGY PROGRESS NOTE
Assessment/Plan


Problems:  


(1) Falls frequently


(2) Failure to thrive in adult


(3) Seizure disorder, complex partial


(4) ACS (acute coronary syndrome)


Assessment/Plan


no new comlaind


neuro evaluation pending


pt/ot


neuro evaluation


MRI of brain


calorie count


symptomatic treatment





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 

since prior study of 8/30/2017





Subjective


ROS Limited/Unobtainable:  No


Interval Events:  looks comfortable


Allergies:  


Coded Allergies:  


     SHELLFISH DERIVED (Unverified  Allergy, Intermediate, ITCHING/HIVES, 7/25/ 14)


     IODINE (Unverified  Allergy, Unknown, 7/25/14)


Uncoded Allergies:  


     SPINACH (Allergy, Intermediate, SEVERE ITCHING/HIVES, 7/25/14)





Objective





Last 24 Hour Vital Signs








  Date Time  Temp Pulse Resp B/P (MAP) Pulse Ox O2 Delivery O2 Flow Rate FiO2


 


4/1/20 11:54 99.0 94 19 133/89 (104) 95   


 


4/1/20 09:00      Nasal Cannula 3.0 


 


4/1/20 08:00 98.6 99 19 146/89 (108) 95   


 


4/1/20 04:00 97.6 79 20 140/75 (96) 95   


 


4/1/20 00:00 97.9 68 20 147/67 (93) 96   


 


3/31/20 21:00      Nasal Cannula 3.0 


 


3/31/20 20:00 97.8 82 18 130/83 (99) 97   


 


3/31/20 16:00 98.6 90 17 114/76 (89) 98   

















Intake and Output  


 


 3/31/20 4/1/20





 19:00 07:00


 


Intake Total 600 ml 800 ml


 


Output Total  500 ml


 


Balance 600 ml 300 ml


 


  


 


Intake IV Total 600 ml 800 ml


 


Output Urine Total  500 ml


 


# Voids  1


 


# Bowel Movements 3 








General Appearance:  WD/WN


HEENT:  normocephalic, mucous membranes moist


Respiratory/Chest:  chest wall non-tender, lungs clear


Cardiovascular:  normal peripheral pulses, normal rate


Abdomen:  soft, non tender, non distended


Extremities:  no cyanosis


Skin:  no lesions


Laboratory Tests


4/1/20 05:45: 


White Blood Count 6.9, Red Blood Count 4.47L, Hemoglobin 14.6, Hematocrit 41.2L

, Mean Corpuscular Volume 92, Mean Corpuscular Hemoglobin 32.7H, Mean 

Corpuscular Hemoglobin Concent 35.4, Red Cell Distribution Width 12.2, Platelet 

Count 198, Mean Platelet Volume 7.0, Neutrophils (%) (Auto) 71.7, Lymphocytes (%

) (Auto) 16.6L, Monocytes (%) (Auto) 9.9, Eosinophils (%) (Auto) 0.7, Basophils 

(%) (Auto) 1.1, Erythrocyte Sedimentation Rate 16, Sodium Level 141, Potassium 

Level 3.0L, Chloride Level 101, Carbon Dioxide Level 27, Anion Gap 13, Blood 

Urea Nitrogen 8, Creatinine 0.6, Estimat Glomerular Filtration Rate > 60, 

Glucose Level 113H, Calcium Level 9.3, Phosphorus Level 2.2L, Magnesium Level 

1.9, Total Bilirubin 0.5, Aspartate Amino Transf (AST/SGOT) 41H, Alanine 

Aminotransferase (ALT/SGPT) 44, Alkaline Phosphatase 186H, C-Reactive Protein, 

Quantitative 15.9H, Total Protein 7.0, Albumin 3.3L, Globulin 3.7, Albumin/

Globulin Ratio 0.9L, Vitamin B12 Level 654


4/1/20 12:10: Methylmalonic Acid [Pending]





Current Medications








 Medications


  (Trade)  Dose


 Ordered  Sig/Eddie


 Route


 PRN Reason  Start Time


 Stop Time Status Last Admin


Dose Admin


 


 Acetaminophen


  (Tylenol)  650 mg  Q6H  PRN


 ORAL


 Mild Pain/Temp > 100.5  3/31/20 02:15


 4/30/20 02:14  4/1/20 10:31


 


 


 Amitriptyline HCl


  (Elavil)  25 mg  BEDTIME


 ORAL


   3/31/20 21:00


 4/30/20 20:59  3/31/20 21:19


 


 


 Clonidine HCl


  (Catapres Tab)  0.1 mg  Q4H  PRN


 ORAL


 sbp greater than 160  3/31/20 08:30


 6/29/20 08:29  3/31/20 09:02


 


 


 Dextrose/Sodium


 Chloride  1,000 ml @ 


 75 mls/hr  [id number]


 IV


   3/31/20 02:15


 4/30/20 02:14  3/31/20 23:51


 


 


 Heparin Sodium


  (Porcine)


  (Heparin 5000


 units/ml)  5,000 units  EVERY 8  HOURS


 SUBQ


   3/31/20 06:00


 5/15/20 05:59  4/1/20 13:31


 


 


 Latanoprost


  (Xalatan)  1 drop  BEDTIME


 BOTH EYES


   3/31/20 21:00


 4/30/20 20:59  3/31/20 21:18


 


 


 Lorazepam


  (Ativan 2mg/ml


 1ml)  0.5 mg  Q4H  PRN


 IV


 For Anxiety  4/1/20 13:45


 4/8/20 13:44   


 


 


 Non-Formulary


 Medication


  (Non-Formulary


 Med)  1 ea  BEDTIME


 ORAL


   3/31/20 21:00


 4/30/20 20:59 UNV  


 


 


 Ondansetron HCl


  (Zofran)  4 mg  PRN  PRN


 IVP


 Nausea & Vomiting  3/31/20 00:15


     


 


 


 Pantoprazole


  (Protonix)  40 mg  DAILY


 ORAL


   3/31/20 09:00


 4/30/20 08:59  4/1/20 08:39


 


 


 Phenytoin


  (Dilantin)  100 mg  BEDTIME


 ORAL


   3/31/20 21:00


 4/30/20 20:59  3/31/20 21:19


 


 


 Phenytoin


  (Dilantin)  200 mg  DAILY


 ORAL


   3/31/20 09:00


 4/30/20 08:59  4/1/20 08:39


 


 


 Primidone


  (Mysoline)  250 mg  DAILY


 ORAL


   3/31/20 09:00


 4/30/20 08:59  4/1/20 08:40


 


 


 Primidone


  (Mysoline)  500 mg  BEDTIME


 ORAL


   3/31/20 21:00


 4/30/20 20:59  3/31/20 21:18


 


 


 Sumatriptan


 Succinate


  (Imitrex)  100 mg  DAILY  PRN


 ORAL


 For Pain  3/31/20 02:30


 4/30/20 02:29   


 


 


 Tamsulosin HCl


  (Flomax)  0.8 mg  BEDTIME


 ORAL


   3/31/20 21:00


 4/30/20 20:59  3/31/20 21:19


 

















Surekha Dewey MD Apr 1, 2020 13:38

## 2020-04-01 NOTE — NUR
NURSE NOTES:

Patient left the unit for MRI in stable condition. Patient's upper and lower dentures are @ 
the bedside.

## 2020-04-01 NOTE — NUR
NURSE NOTES:

Patient attempted to get out of bed, causing bed alarm to sound. Assisted patient to 
bathroom with his bedside walker. While returning patient to bed, patient started to loose 
his balance. RN maintained hold of the patient and guided him to the floor. Patient then 
insisted he lie completely down on the floor, stating he wants to "take a 10 minute nap on 
the floor". Reassured patient that he will be able to rest in bed and provided 
reorientation. CNA arrived to assist with returning patient to his bed. Patient denies any 
pain. Vital signs stable. Charge RN notified. Transferred patient to room closer to the 
nursing station. Bed locked and in lowest position. Call light in reach and reminded patient 
to use call light if he needs assistance. Bed alarm activated zone 2. Fall precautions 
remain in place. Will continue to monitor the patient.

## 2020-04-01 NOTE — DIAGNOSTIC IMAGING REPORT
Indication: Bilateral arm weakness, history of trauma 

 

Technique: Sagittal T1 FLAIR PROPELLER, sagittal T2 PROPELLOR, sagittal STIR, axial

T2 PROPELLER, axial 3D COSMIC ASPIR images were obtained through the cervical spine

 

Comparison: none

 

Findings: There is a 5 mm focus of low T1, intermediate T2, and mildly bright STIR

signal within the C6 vertebral body anteriorly, best appreciated on the sagittal

images right equivocally evident as a bright focus with a low signal rim on the

COSMIC ASPIR images.

 

Bony alignment is normal. The intrinsic cord signal is normal. There is a very slight

wedge deformity of the C7 vertebral body. The remaining vertebral body heights are

preserved. There is equivocal mild narrowing of the C3-4, C4-5, C5-6 discs. The discs

appear desiccated, as expected for age.

 

At C3-4, there is mild to moderate right and moderate to severe left neural foraminal

stenosis predominantly due to facet hypertrophy.. No significant disc bulge or

protrusion or spinal stenosis.

 

At C4-5, there is minimal broad-based posterior central disc protrusion, which does

not significantly narrow the spinal canal. The neural foramina are preserved.

 

At C5-6, there is moderate right, moderate to severe left neural foraminal stenosis.

No significant disc bulge or protrusion or spinal stenosis

 

At the remaining levels, no significant disc bulge or protrusion, spinal stenosis, or

neural foraminal stenosis.

 

The included extraspinal soft tissues are unremarkable.

 

Impression: No acute bony trauma

 

Mild multilevel degenerative changes, as detailed above.

 

Focal signal abnormality within the C6 vertebral body, could represent a process such

as a degenerative subchondral cyst or geode, but other etiologies including neoplasm

possible. Consider CT to better characterize

 

Slight anterior wedge deformity of the C7 vertebral body. Suspect developmental or on

the basis of degenerative remodeling, but old mild compression fracture deformity

also possible

## 2020-04-01 NOTE — NUR
CASE MANAGEMENT: REVIEW



04/01/20/20



HX: COPD AND SEIZURE



SI:MULTIPLE INJURIES DUE TO TRAMA . ACUTE HEAD INJURY .  FAILURE TO THRIVE . COPD

98.6   99   19   146/89   95% ON 3L NC

K+ 2.2    PHOS 2.2 ALKP 186  CRPRO 15.9 





IS:IV D5@75ML/HR

DILANTIN PO QD

MYSOLINE PO QD

HEPARIN SQ TID

PROTONIX PO QD

CLONIDINE Q4HR/PRN 

DILANTIN PO QD

FLOMAX PO QHS



\**: [facility] 

DCP:HOME WHEN STABLE 



PLAN:

PT EVAL 

REHAB CONSULT 

CALORIE COUNT 

OXYGEN THERAPY - DECREASE SAT

METHYLMALONIC ACID- PENDING 

CXR-Bilateral basilar atelectasis

PULMONARY CONSULT 

PO K+ REPLACEMENT

## 2020-04-01 NOTE — NUR
*-* INSURANCE *-*



ALL AVAILABLE CLINICALS HAVE BEEN FAXED TO: 



[facility]

P: 

F: [phone] (FAX ALL CLINICALS)

## 2020-04-01 NOTE — NUR
NURSE NOTES:

Per patient,there is no family to bring Detril la medicine, no alternative per pharmacy. RN 
relayed to Dr. Padron. Dr. Padron said to d/c. Patient is urinating well.

## 2020-04-01 NOTE — NUR
NURSE NOTES:

Patient had soft bowel movementx2 and patient stated that he has been having loose bowel 
movement frequently @ home. RN collected stool specimen and sent it to lab for c-diff per 
protocol. Proper incontinent  and skin care done. And patient complains of back pain with 
episodes of multiple falls @ home, not [facility]. RN received order from Dr. Dewey to do x-ray 
of L-spine.

## 2020-04-01 NOTE — INTERNAL MED PROGRESS NOTE
Subjective


Date of Service:  Apr 1, 2020


Physician Name


WindyDominic


Attending Physician


Aron Padron MD





Current Medications








 Medications


  (Trade)  Dose


 Ordered  Sig/Eddie


 Route


 PRN Reason  Start Time


 Stop Time Status Last Admin


Dose Admin


 


 Acetaminophen


  (Tylenol)  650 mg  Q6H  PRN


 ORAL


 Mild Pain/Temp > 100.5  3/31/20 02:15


 4/30/20 02:14  4/1/20 10:31


 


 


 Amitriptyline HCl


  (Elavil)  25 mg  BEDTIME


 ORAL


   3/31/20 21:00


 4/30/20 20:59  3/31/20 21:19


 


 


 Clonidine HCl


  (Catapres Tab)  0.1 mg  Q4H  PRN


 ORAL


 sbp greater than 160  3/31/20 08:30


 6/29/20 08:29  3/31/20 09:02


 


 


 Dextrose/Sodium


 Chloride  1,000 ml @ 


 75 mls/hr  [id number]


 IV


   3/31/20 02:15


 4/30/20 02:14  3/31/20 23:51


 


 


 Heparin Sodium


  (Porcine)


  (Heparin 5000


 units/ml)  5,000 units  EVERY 8  HOURS


 SUBQ


   3/31/20 06:00


 5/15/20 05:59  4/1/20 05:03


 


 


 Latanoprost


  (Xalatan)  1 drop  BEDTIME


 BOTH EYES


   3/31/20 21:00


 4/30/20 20:59  3/31/20 21:18


 


 


 Non-Formulary


 Medication


  (Non-Formulary


 Med)  1 ea  BEDTIME


 ORAL


   3/31/20 21:00


 4/30/20 20:59 UNV  


 


 


 Ondansetron HCl


  (Zofran)  4 mg  PRN  PRN


 IVP


 Nausea & Vomiting  3/31/20 00:15


     


 


 


 Pantoprazole


  (Protonix)  40 mg  DAILY


 ORAL


   3/31/20 09:00


 4/30/20 08:59  4/1/20 08:39


 


 


 Phenytoin


  (Dilantin)  100 mg  BEDTIME


 ORAL


   3/31/20 21:00


 4/30/20 20:59  3/31/20 21:19


 


 


 Phenytoin


  (Dilantin)  200 mg  DAILY


 ORAL


   3/31/20 09:00


 4/30/20 08:59  4/1/20 08:39


 


 


 Primidone


  (Mysoline)  250 mg  DAILY


 ORAL


   3/31/20 09:00


 4/30/20 08:59  4/1/20 08:40


 


 


 Primidone


  (Mysoline)  500 mg  BEDTIME


 ORAL


   3/31/20 21:00


 4/30/20 20:59  3/31/20 21:18


 


 


 Sumatriptan


 Succinate


  (Imitrex)  100 mg  DAILY  PRN


 ORAL


 For Pain  3/31/20 02:30


 4/30/20 02:29   


 


 


 Tamsulosin HCl


  (Flomax)  0.8 mg  BEDTIME


 ORAL


   3/31/20 21:00


 4/30/20 20:59  3/31/20 21:19


 








Allergies:  


Coded Allergies:  


     SHELLFISH DERIVED (Unverified  Allergy, Intermediate, ITCHING/HIVES, 7/25/ 14)


     IODINE (Unverified  Allergy, Unknown, 7/25/14)


Uncoded Allergies:  


     SPINACH (Allergy, Intermediate, SEVERE ITCHING/HIVES, 7/25/14)


ROS Limited/Unobtainable:  No


Constitutional:  Reports: no symptoms


HEENT:  Reports: no symptoms


Cardiovascular:  Reports: no symptoms


Respiratory:  Reports: no symptoms


Gastrointestinal/Abdominal:  Reports: no symptoms


Genitourinary:  Reports: no symptoms


Neurologic/Psychiatric:  Reports: no symptoms


Subjective


71YO M with H/O seizure D/O admitted with frequent falls.  Cover for Int Med-Dr Padron





Objective





Last Vital Signs








  Date Time  Temp Pulse Resp B/P (MAP) Pulse Ox O2 Delivery O2 Flow Rate FiO2


 


4/1/20 11:54 99.0 94 19 133/89 (104) 95   


 


4/1/20 09:00      Nasal Cannula 3.0 











Laboratory Tests








Test


  4/1/20


05:45


 


White Blood Count


  6.9 K/UL


(4.8-10.8)


 


Red Blood Count


  4.47 M/UL


(4.70-6.10)  L


 


Hemoglobin


  14.6 G/DL


(14.2-18.0)


 


Hematocrit


  41.2 %


(42.0-52.0)  L


 


Mean Corpuscular Volume 92 FL (80-99)  


 


Mean Corpuscular Hemoglobin


  32.7 PG


(27.0-31.0)  H


 


Mean Corpuscular Hemoglobin


Concent 35.4 G/DL


(32.0-36.0)


 


Red Cell Distribution Width


  12.2 %


(11.6-14.8)


 


Platelet Count


  198 K/UL


(150-450)


 


Mean Platelet Volume


  7.0 FL


(6.5-10.1)


 


Neutrophils (%) (Auto)


  71.7 %


(45.0-75.0)


 


Lymphocytes (%) (Auto)


  16.6 %


(20.0-45.0)  L


 


Monocytes (%) (Auto)


  9.9 %


(1.0-10.0)


 


Eosinophils (%) (Auto)


  0.7 %


(0.0-3.0)


 


Basophils (%) (Auto)


  1.1 %


(0.0-2.0)


 


Erythrocyte Sedimentation Rate


  16 MM/HR


(0-20)


 


Sodium Level


  141 MMOL/L


(136-145)


 


Potassium Level


  3.0 MMOL/L


(3.5-5.1)  L


 


Chloride Level


  101 MMOL/L


()


 


Carbon Dioxide Level


  27 MMOL/L


(21-32)


 


Anion Gap


  13 mmol/L


(5-15)


 


Blood Urea Nitrogen


  8 mg/dL (7-18)


 


 


Creatinine


  0.6 MG/DL


(0.55-1.30)


 


Estimat Glomerular Filtration


Rate > 60 mL/min


(>60)


 


Glucose Level


  113 MG/DL


()  H


 


Calcium Level


  9.3 MG/DL


(8.5-10.1)


 


Phosphorus Level


  2.2 MG/DL


(2.5-4.9)  L


 


Magnesium Level


  1.9 MG/DL


(1.8-2.4)


 


Total Bilirubin


  0.5 MG/DL


(0.2-1.0)


 


Aspartate Amino Transf


(AST/SGOT) 41 U/L (15-37)


H


 


Alanine Aminotransferase


(ALT/SGPT) 44 U/L (12-78)


 


 


Alkaline Phosphatase


  186 U/L


()  H


 


C-Reactive Protein,


Quantitative 15.9 mg/dL


(0.00-0.90)  H


 


Total Protein


  7.0 G/DL


(6.4-8.2)


 


Albumin


  3.3 G/DL


(3.4-5.0)  L


 


Globulin 3.7 g/dL  


 


Albumin/Globulin Ratio


  0.9 (1.0-2.7)


L


 


Vitamin B12 Level


  654 PG/ML


(193-986)

















Intake and Output  


 


 3/31/20 4/1/20





 19:00 07:00


 


Intake Total 600 ml 800 ml


 


Output Total  500 ml


 


Balance 600 ml 300 ml


 


  


 


Intake IV Total 600 ml 800 ml


 


Output Urine Total  500 ml


 


# Voids  1


 


# Bowel Movements 3 








Objective


PHYSICAL EXAMINATION:


GENERAL:  The patient is a well-developed and well-nourished 


male, in no apparent distress.


HEENT:  Eyes, pupils are equal and responsive to light and accommodation.


Extraocular movements are intact.


NECK:  Supple without lymphadenopathy.


CHEST:  Lungs are clear to auscultation bilaterally without wheeze or


rales.


CARDIOVASCULAR:  Regular rate.  S1, S2 are normal without murmurs, rubs, or


gallops.


ABDOMEN:  Soft, nontender, and nondistended.  Positive bowel sounds.  No


evidence of hepatosplenomegaly.  Currently, no rebound or guarding


noted.


EXTREMITIES:  Negative for clubbing, cyanosis, or edema.


RECTAL/GENITAL:  Refused.


NEUROLOGIC:  Cranial nerves II through XII are grossly intact without focal


deficits.  Motor strength is 5/5 bilaterally.  Deep tendon reflexes are 2+


plantar.





Assessment/Plan


Assessment/Plan


ASSESSMENT:  This is a 70-year-old male with:





1. Frequent falls.


2. Seizure disorder.


3. Benign prostatic hypertrophy.


4. Chronic obstructive pulmonary disease.


5.  Hypokalemia





TREATMENT:


1. Fall injury.  This may be secondary to orthostatic hypotension versus


acute cerebrovascular accident.  Carotid duplex Dopplers of the brain are


pending.


2. Seizure disorder.  Continue Dilantin as above.


3. History of deep venous thrombosis/pulmonary embolism.  Continue


Coumadin as above.


4. Cerebrovascular disease.  The patient denies history of stroke.


Continue aspirin as above.


5. Benign prostatic hypertrophy.  Continue Flomax as above.


6. Chronic obstructive pulmonary disease.  A Pulmonary consultation has


been obtained with Dr. Sruekha Dewey.


7.  Oral potassium replacement











Dominic Temple MD Apr 1, 2020 12:03

## 2020-04-01 NOTE — NUR
RD ASSESSMENT & RECOMMENDATIONS

SEE CARE ACTIVITY FOR COMPLETE ASSESSMENT



DAILY ESTIMATED NEEDS:

Needs based on Pulmonary 79kg  

25-30  kcals/kg 

[id number]  total kcals

1-1.5  g protein/kg

  g total protein 

25-30  mL/kg

1975-2370  total fluid mLs



NUTRITION DIAGNOSIS:

Altered nutrition related lab values r/t clinical status as evidenced by

low K (3.0), low phos (2.2), elev LFT's and Creat kinase.



CURRENT DIET: Regular     



PO DIET RECOMMENDATIONS:

With consistent po intake >75%, rec Low Na diet  



ADDITIONAL RECOMMENDATIONS:

1) Monitor po intake, need for soft easy chew  

2) F/up w/ Kcal count  

3) Check lytes daily, replete as needed (low K, phos) 

4) Obtain a calibrated bed scale wt

## 2020-04-02 NOTE — DIAGNOSTIC IMAGING REPORT
Indication: Back pain

 

Comparison: None

 

Findings: 3 views of the lumbar spine were obtained. 

 

The study is limited due to rotation and generalized osteopenia as well as moderate

degree of bowel gas and stool obscuring lumbar anatomy. The bones are osteopenic. No

obvious acute fracture is appreciated although there is mild loss of height of the

lumbar vertebra. There is no obvious malalignment that may be achievable with trauma.

There is a scoliosis convex to the right.

 

IMPRESSION:

 

No obvious acute injury. Scoliosis and generalized osteopenia noted.

## 2020-04-02 NOTE — NUR
NURSE NOTES:

Patient is in bed, awake and alert x2, forgetful; unaware of his location and why he is 
here. Provided reorientation. On nasal cannula 3L. IV intact and running IVF as ordered. Bed 
locked and in lowest position. Bed alarm activated. Fall precautions in place. Walker at 
bedside. Call light within easy reach. Reminded patient to call for assistance if needed. 
Will continue plan of care.

## 2020-04-02 NOTE — NUR
NURSE NOTES:

pt is in the bed alert and awake. respiration is even and unlabored. denies any pain and 
discomfort. HOB elevated. no acute distress noted at this time. call light is within reach, 
will continue to follow plan of care.

## 2020-04-02 NOTE — INTERNAL MED PROGRESS NOTE
Subjective


Date of Service:  Apr 2, 2020


Physician Name


Dominic Temple


Attending Physician


Aron Padron MD





Current Medications








 Medications


  (Trade)  Dose


 Ordered  Sig/Eddie


 Route


 PRN Reason  Start Time


 Stop Time Status Last Admin


Dose Admin


 


 Acetaminophen


  (Tylenol)  650 mg  Q6H  PRN


 ORAL


 Mild Pain/Temp > 100.5  3/31/20 02:15


 4/30/20 02:14  4/2/20 10:01


 


 


 Amitriptyline HCl


  (Elavil)  25 mg  BEDTIME


 ORAL


   3/31/20 21:00


 4/30/20 20:59  4/1/20 21:34


 


 


 Clonidine HCl


  (Catapres Tab)  0.1 mg  Q4H  PRN


 ORAL


 sbp greater than 160  3/31/20 08:30


 6/29/20 08:29  3/31/20 09:02


 


 


 Dextrose/Sodium


 Chloride  1,000 ml @ 


 75 mls/hr  [id number]


 IV


   3/31/20 02:15


 4/30/20 02:14  4/1/20 14:06


 


 


 Heparin Sodium


  (Porcine)


  (Heparin 5000


 units/ml)  5,000 units  EVERY 8  HOURS


 SUBQ


   3/31/20 06:00


 5/15/20 05:59  4/2/20 15:13


 


 


 Latanoprost


  (Xalatan)  1 drop  BEDTIME


 BOTH EYES


   3/31/20 21:00


 4/30/20 20:59  4/1/20 21:35


 


 


 Lorazepam


  (Ativan 2mg/ml


 1ml)  0.5 mg  Q4H  PRN


 IV


 For Anxiety  4/1/20 13:45


 4/8/20 13:44   


 


 


 Pantoprazole


  (Protonix)  40 mg  DAILY


 ORAL


   3/31/20 09:00


 4/30/20 08:59  4/2/20 10:05


 


 


 Phenytoin


  (Dilantin)  100 mg  BEDTIME


 ORAL


   3/31/20 21:00


 4/30/20 20:59  4/1/20 21:35


 


 


 Phenytoin


  (Dilantin)  200 mg  DAILY


 ORAL


   3/31/20 09:00


 4/30/20 08:59  4/2/20 10:02


 


 


 Primidone


  (Mysoline)  250 mg  DAILY


 ORAL


   3/31/20 09:00


 4/30/20 08:59  4/2/20 10:02


 


 


 Primidone


  (Mysoline)  500 mg  BEDTIME


 ORAL


   3/31/20 21:00


 4/30/20 20:59  4/1/20 21:33


 


 


 Sumatriptan


 Succinate


  (Imitrex)  100 mg  DAILY  PRN


 ORAL


 For Pain  3/31/20 02:30


 4/30/20 02:29   


 


 


 Tamsulosin HCl


  (Flomax)  0.8 mg  BEDTIME


 ORAL


   3/31/20 21:00


 4/30/20 20:59  4/1/20 21:35


 








Allergies:  


Coded Allergies:  


     SHELLFISH DERIVED (Unverified  Allergy, Intermediate, ITCHING/HIVES, 7/25/ 14)


     IODINE (Unverified  Allergy, Unknown, 7/25/14)


Uncoded Allergies:  


     SPINACH (Allergy, Intermediate, SEVERE ITCHING/HIVES, 7/25/14)


ROS Limited/Unobtainable:  No


Constitutional:  Reports: no symptoms


HEENT:  Reports: no symptoms


Cardiovascular:  Reports: no symptoms


Respiratory:  Reports: no symptoms


Gastrointestinal/Abdominal:  Reports: no symptoms


Genitourinary:  Reports: no symptoms


Neurologic/Psychiatric:  Reports: no symptoms


Subjective


71YO M with H/O seizure D/O admitted with frequent falls.  Cover for Int Med-Dr Padron





Objective





Last Vital Signs








  Date Time  Temp Pulse Resp B/P (MAP) Pulse Ox O2 Delivery O2 Flow Rate FiO2


 


4/2/20 16:00 98.7 87 20 135/77 (96) 95   


 


4/2/20 09:00      Nasal Cannula 3.0 











Laboratory Tests








Test


  4/2/20


04:45 4/2/20


04:53


 


Primidone (Mysoline) Level Pending   


 


Phenobarbital Level Pending   


 


White Blood Count


  


  7.1 K/UL


(4.8-10.8)


 


Red Blood Count


  


  4.29 M/UL


(4.70-6.10)  L


 


Hemoglobin


  


  14.0 G/DL


(14.2-18.0)  L


 


Hematocrit


  


  39.4 %


(42.0-52.0)  L


 


Mean Corpuscular Volume  92 FL (80-99)  


 


Mean Corpuscular Hemoglobin


  


  32.7 PG


(27.0-31.0)  H


 


Mean Corpuscular Hemoglobin


Concent 


  35.6 G/DL


(32.0-36.0)


 


Red Cell Distribution Width


  


  12.1 %


(11.6-14.8)


 


Platelet Count


  


  198 K/UL


(150-450)


 


Mean Platelet Volume


  


  6.9 FL


(6.5-10.1)


 


Neutrophils (%) (Auto)


  


  73.5 %


(45.0-75.0)


 


Lymphocytes (%) (Auto)


  


  13.1 %


(20.0-45.0)  L


 


Monocytes (%) (Auto)


  


  12.2 %


(1.0-10.0)  H


 


Eosinophils (%) (Auto)


  


  0.2 %


(0.0-3.0)


 


Basophils (%) (Auto)


  


  1.1 %


(0.0-2.0)


 


Sodium Level


  


  139 MMOL/L


(136-145)


 


Potassium Level


  


  3.1 MMOL/L


(3.5-5.1)  L


 


Chloride Level


  


  101 MMOL/L


()


 


Carbon Dioxide Level


  


  29 MMOL/L


(21-32)


 


Anion Gap


  


  9 mmol/L


(5-15)


 


Blood Urea Nitrogen


  


  12 mg/dL


(7-18)


 


Creatinine


  


  0.6 MG/DL


(0.55-1.30)


 


Estimat Glomerular Filtration


Rate 


  > 60 mL/min


(>60)


 


Glucose Level


  


  107 MG/DL


()  H


 


Calcium Level


  


  9.5 MG/DL


(8.5-10.1)











Microbiology








 Date/Time


Source Procedure


Growth Status


 


 


 4/1/20 15:30


Stool Clostridium difficile Toxin Assay - Final Complete

















Intake and Output  


 


 4/1/20 4/2/20





 19:00 07:00


 


Intake Total 1520 ml 240 ml


 


Output Total  600 ml


 


Balance 1520 ml -360 ml


 


  


 


Intake Oral 720 ml 240 ml


 


IV Total 800 ml 


 


Output Urine Total  600 ml


 


# Bowel Movements 2 1








Objective


PHYSICAL EXAMINATION:


GENERAL:  The patient is a well-developed and well-nourished 


male, in no apparent distress.


HEENT:  Eyes, pupils are equal and responsive to light and accommodation.


Extraocular movements are intact.


NECK:  Supple without lymphadenopathy.


CHEST:  Lungs are clear to auscultation bilaterally without wheeze or


rales.


CARDIOVASCULAR:  Regular rate.  S1, S2 are normal without murmurs, rubs, or


gallops.


ABDOMEN:  Soft, nontender, and nondistended.  Positive bowel sounds.  No


evidence of hepatosplenomegaly.  Currently, no rebound or guarding


noted.


EXTREMITIES:  Negative for clubbing, cyanosis, or edema.


RECTAL/GENITAL:  Refused.


NEUROLOGIC:  Cranial nerves II through XII are grossly intact without focal


deficits.  Motor strength is 5/5 bilaterally.  Deep tendon reflexes are 2+


plantar.





Assessment/Plan


Assessment/Plan


ASSESSMENT:  This is a 70-year-old male with:





1. Frequent falls.


2. Seizure disorder.


3. Benign prostatic hypertrophy.


4. Chronic obstructive pulmonary disease.


5.  Hypokalemia





TREATMENT:


1. Fall injury.  This may be secondary to orthostatic hypotension versus


acute cerebrovascular accident.  Carotid duplex Dopplers of the brain are


pending.


2. Seizure disorder.  Continue Dilantin as above.


3. History of deep venous thrombosis/pulmonary embolism.  Continue


Coumadin as above.


4. Cerebrovascular disease.  The patient denies history of stroke.


Continue aspirin as above.


5. Benign prostatic hypertrophy.  Continue Flomax as above.


6. Chronic obstructive pulmonary disease.  A Pulmonary consultation has


been obtained with Dr. Surekha Dewey.


7.  Oral potassium replacement











Dominic Temple MD Apr 2, 2020 18:09

## 2020-04-02 NOTE — PULMONOLOGY PROGRESS NOTE
Assessment/Plan


Problems:  


(1) Falls frequently


(2) Cerebellar atrophy


(3) Failure to thrive in adult


(4) Seizure disorder, complex partial


(5) ACS (acute coronary syndrome)


Assessment/Plan


no new comlaind





pt/ot


neuro evaluation appreciated and reviewed


MRI of brain


calorie count


symptomatic treatment





MRI of Cspine pending


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 

since prior study of 8/30/2017





Subjective


ROS Limited/Unobtainable:  No


Constitutional:  Reports: no symptoms


HEENT:  Repors: no symptoms


Allergies:  


Coded Allergies:  


     SHELLFISH DERIVED (Unverified  Allergy, Intermediate, ITCHING/HIVES, 7/25/ 14)


     IODINE (Unverified  Allergy, Unknown, 7/25/14)


Uncoded Allergies:  


     SPINACH (Allergy, Intermediate, SEVERE ITCHING/HIVES, 7/25/14)





Objective





Last 24 Hour Vital Signs








  Date Time  Temp Pulse Resp B/P (MAP) Pulse Ox O2 Delivery O2 Flow Rate FiO2


 


4/2/20 09:00      Nasal Cannula 3.0 


 


4/2/20 08:00 99.0 94 20 132/74 (93) 93   


 


4/2/20 04:00 98.1 95 20 143/88 (106) 94   


 


4/2/20 00:00 98.1 99 20 146/83 (104) 94   


 


4/1/20 21:00      Nasal Cannula 3.0 


 


4/1/20 20:00 98.4 92 20 144/82 (102) 96   


 


4/1/20 16:00 98.7 90 18 127/77 (94) 94   

















Intake and Output  


 


 4/1/20 4/2/20





 19:00 07:00


 


Intake Total 1520 ml 240 ml


 


Output Total  600 ml


 


Balance 1520 ml -360 ml


 


  


 


Intake Oral 720 ml 240 ml


 


IV Total 800 ml 


 


Output Urine Total  600 ml


 


# Bowel Movements 2 1








General Appearance:  WD/WN


Respiratory/Chest:  chest wall non-tender, normal breath sounds


Cardiovascular:  normal peripheral pulses, regular rhythm


Abdomen:  normal bowel sounds, soft, non tender


Genitourinary:  normal external genitalia


Extremities:  no clubbing


Skin:  no rash





Microbiology








 Date/Time


Source Procedure


Growth Status


 


 


 4/1/20 15:30


Stool Clostridium difficile Toxin Assay - Final Complete








Laboratory Tests


4/2/20 04:45: 


Primidone (Mysoline) Level [Pending], Phenobarbital Level [Pending]


4/2/20 04:53: 


White Blood Count 7.1, Red Blood Count 4.29L, Hemoglobin 14.0L, Hematocrit 39.4L

, Mean Corpuscular Volume 92, Mean Corpuscular Hemoglobin 32.7H, Mean 

Corpuscular Hemoglobin Concent 35.6, Red Cell Distribution Width 12.1, Platelet 

Count 198, Mean Platelet Volume 6.9, Neutrophils (%) (Auto) 73.5, Lymphocytes (%

) (Auto) 13.1L, Monocytes (%) (Auto) 12.2H, Eosinophils (%) (Auto) 0.2, 

Basophils (%) (Auto) 1.1, Sodium Level 139, Potassium Level 3.1L, Chloride 

Level 101, Carbon Dioxide Level 29, Anion Gap 9, Blood Urea Nitrogen 12, 

Creatinine 0.6, Estimat Glomerular Filtration Rate > 60, Glucose Level 107H, 

Calcium Level 9.5





Current Medications








 Medications


  (Trade)  Dose


 Ordered  Sig/Eddie


 Route


 PRN Reason  Start Time


 Stop Time Status Last Admin


Dose Admin


 


 Acetaminophen


  (Tylenol)  650 mg  Q6H  PRN


 ORAL


 Mild Pain/Temp > 100.5  3/31/20 02:15


 4/30/20 02:14  4/2/20 10:01


 


 


 Amitriptyline HCl


  (Elavil)  25 mg  BEDTIME


 ORAL


   3/31/20 21:00


 4/30/20 20:59  4/1/20 21:34


 


 


 Clonidine HCl


  (Catapres Tab)  0.1 mg  Q4H  PRN


 ORAL


 sbp greater than 160  3/31/20 08:30


 6/29/20 08:29  3/31/20 09:02


 


 


 Dextrose/Sodium


 Chloride  1,000 ml @ 


 75 mls/hr  [id number]


 IV


   3/31/20 02:15


 4/30/20 02:14  4/1/20 14:06


 


 


 Heparin Sodium


  (Porcine)


  (Heparin 5000


 units/ml)  5,000 units  EVERY 8  HOURS


 SUBQ


   3/31/20 06:00


 5/15/20 05:59  4/2/20 05:19


 


 


 Latanoprost


  (Xalatan)  1 drop  BEDTIME


 BOTH EYES


   3/31/20 21:00


 4/30/20 20:59  4/1/20 21:35


 


 


 Lorazepam


  (Ativan 2mg/ml


 1ml)  0.5 mg  Q4H  PRN


 IV


 For Anxiety  4/1/20 13:45


 4/8/20 13:44   


 


 


 Pantoprazole


  (Protonix)  40 mg  DAILY


 ORAL


   3/31/20 09:00


 4/30/20 08:59  4/2/20 10:05


 


 


 Phenytoin


  (Dilantin)  100 mg  BEDTIME


 ORAL


   3/31/20 21:00


 4/30/20 20:59  4/1/20 21:35


 


 


 Phenytoin


  (Dilantin)  200 mg  DAILY


 ORAL


   3/31/20 09:00


 4/30/20 08:59  4/2/20 10:02


 


 


 Primidone


  (Mysoline)  250 mg  DAILY


 ORAL


   3/31/20 09:00


 4/30/20 08:59  4/2/20 10:02


 


 


 Primidone


  (Mysoline)  500 mg  BEDTIME


 ORAL


   3/31/20 21:00


 4/30/20 20:59  4/1/20 21:33


 


 


 Sumatriptan


 Succinate


  (Imitrex)  100 mg  DAILY  PRN


 ORAL


 For Pain  3/31/20 02:30


 4/30/20 02:29   


 


 


 Tamsulosin HCl


  (Flomax)  0.8 mg  BEDTIME


 ORAL


   3/31/20 21:00


 4/30/20 20:59  4/1/20 21:35


 

















Surekha Dewey MD Apr 2, 2020 12:57

## 2020-04-02 NOTE — NUR
*-* INSURANCE *-*



ALL AVAILABLE CLINICALS HAVE BEEN FAXED TO: 



[facility]

P: 

F: [phone] (FAX ALL CLINICALS)

## 2020-04-02 NOTE — PROGRESS NOTE
DATE:  04/02/2020

SUBJECTIVE:  The patient is about the same.  He is a little lethargic this

morning.  He had his MRI of the cervical spine, which was abnormal

revealing degenerative joint disease with narrowing of C3 through C6

disks.  There is no significant spinal stenosis or foraminal stenosis.

There is a focal signal abnormality within the C6 vertebral body, could be

degenerative subchondral cyst or geode.  A neoplasm was possible.

Consider CT scan.  There is an anterior wedge deformity at C7.  The

Dilantin level is 17.8 on 03/30/2020.  No primidone level was obtained.

The vitamin B12 level yesterday was 654.  The rest of the studies are

pending.



PHYSICAL EXAMINATION:

VITAL SIGNS:  Temperature is 99 degrees, pulse is 94 and regular, blood

pressure 132/74, pulse oximetry of 93 with a nasal cannula at 3

liters/minute.

NEUROLOGIC EXAMINATION:

MENTAL STATUS:  The patient is awake.  Basically alert.  He knows it is

04/02/2020.  Place, he knows he is at [facility].  Person, he is

oriented to person.  Language function, spoken speech is minimally

dysarthric without paraphasias.  Comprehension, repetition is intact.  He

can spell world backwards and forwards without difficulty.  There is no

right left confusion or finger agnosia.

CRANIAL NERVE EXAMINATION:

CRANIAL NERVES III, IV, AND VI:  Extraocular motility is full.

MUSCLE EXAMINATION:  There is slight pronation of the left upper extremity.

Muscle strength in the both lower extremities is normal 5/5.

REFLEXES:  +1.5 at the left knee, 0 at the right knee, 0 at the ankles.

Toes, there is a lot of withdrawal.

SENSORY EXAM:  Pinprick may be normal in the lower extremities.



IMPRESSION:  The patient's possible Babinski responses may just be

withdrawal.  He does not have B12 deficiency.  There is no cord

compression.  I am going to obtain a primidone level, which is broken down

into phenobarbital.



PLAN:

1. As above.

2. Physical therapy with gait training.









  ______________________________________________

  Storm Fink MD





DR:  CHASE

D:  04/02/2020 12:08

T:  04/02/2020 17:06

JOB#:  [id number]

CC:

## 2020-04-02 NOTE — NUR
CASE MANAGEMENT: REVIEW



04/02/20/20



HX: COPD AND SEIZURE



SI:MULTIPLE INJURIES DUE TO TRAMA . ACUTE HEAD INJURY .  FAILURE TO THRIVE . COPD

99.0   94   20   132/74   93% ON 3L NC

K+ 3.1    





IS:PO K-DUR X1

IV D5@75ML/HR

DILANTIN PO QD

MYSOLINE PO QD

HEPARIN SQ TID

PROTONIX PO QD

CLONIDINE Q4HR/PRN 

DILANTIN PO QD

FLOMAX PO QHS



\**: [facility]E [facility]G UNIT 

DCP:HOME WHEN STABLE 



PLAN:

PT EVAL AND THERAPY 

LAB PENDING: PRIMIDONE 

PHENOBARBITAL

## 2020-04-02 NOTE — NUR
NURSE NOTES:WOUND CARE NOTES:Pt presented on admission with Incontinence Associated 
dermatitis scrotum R buttocks and  cleft of buttocks. Affected areas are erythematous 
with satellite lesions noted to R Gluteal cheek. Pt observed to be wearing personal 
incontinence briefs and was educated of risks for further skin breakdown laying in soiled 
briefs. Pt insisted on wearing briefs.

R and L heels are pink and easily blanchable. No other areas of skin breakdown noted. 



Tx.Plan: Apply Moisture Barrier Paste with each incontinence care.

            Cover Sacrum with Optifoam drsg. Change every 3 days and prn.

            Apply Cavilon Skin Barrier to both heels. Cover each Heel with Optifoam drsg. 
Change every 7 days and prn.

            Reposition at least every 2hours or as tolerated.

            Off-load heels with pillow.

## 2020-04-03 NOTE — NUR
NOTE



CALL MADE TO [facility] [phone].  S/W NCM ZACH. INFORMED CM ZACH OF PATIENTS NEED FOR SNF 
PLACEMENT FOR PT/OT. STATES HE WILL FAX LIST OF CONTRACTED SNF'S TO THIS CM @ [phone]. 
ALSO STATES THAT HE WILL ALSO ASSIST IN SEEKING PLACEMENT.

## 2020-04-03 NOTE — INTERNAL MED PROGRESS NOTE
Subjective


Physician Name


Aron Padron


Attending Physician


Aron Padron MD





Current Medications








 Medications


  (Trade)  Dose


 Ordered  Sig/Eddie


 Route


 PRN Reason  Start Time


 Stop Time Status Last Admin


Dose Admin


 


 Acetaminophen


  (Tylenol)  650 mg  Q6H  PRN


 ORAL


 Mild Pain/Temp > 100.5  3/31/20 02:15


 4/30/20 02:14  4/2/20 10:01


 


 


 Amitriptyline HCl


  (Elavil)  25 mg  BEDTIME


 ORAL


   3/31/20 21:00


 4/30/20 20:59  4/3/20 21:53


 


 


 Clonidine HCl


  (Catapres Tab)  0.1 mg  Q4H  PRN


 ORAL


 sbp greater than 160  3/31/20 08:30


 6/29/20 08:29  3/31/20 09:02


 


 


 Dextrose/Sodium


 Chloride  1,000 ml @ 


 75 mls/hr  [id number]


 IV


   3/31/20 02:15


 4/30/20 02:14  4/3/20 09:28


 


 


 Heparin Sodium


  (Porcine)


  (Heparin 5000


 units/ml)  5,000 units  EVERY 8  HOURS


 SUBQ


   3/31/20 06:00


 5/15/20 05:59  4/3/20 21:51


 


 


 Latanoprost


  (Xalatan)  1 drop  BEDTIME


 BOTH EYES


   3/31/20 21:00


 4/30/20 20:59  4/3/20 22:32


 


 


 Lorazepam


  (Ativan 2mg/ml


 1ml)  0.5 mg  Q4H  PRN


 IV


 For Anxiety  4/1/20 13:45


 4/8/20 13:44   


 


 


 Pantoprazole


  (Protonix)  40 mg  DAILY


 ORAL


   3/31/20 09:00


 4/30/20 08:59  4/3/20 09:12


 


 


 Phenytoin


  (Dilantin)  100 mg  BEDTIME


 ORAL


   3/31/20 21:00


 4/30/20 20:59  4/3/20 21:52


 


 


 Phenytoin


  (Dilantin)  200 mg  DAILY


 ORAL


   3/31/20 09:00


 4/30/20 08:59  4/3/20 09:12


 


 


 Primidone


  (Mysoline)  250 mg  DAILY


 ORAL


   3/31/20 09:00


 4/30/20 08:59  4/3/20 09:14


 


 


 Primidone


  (Mysoline)  500 mg  BEDTIME


 ORAL


   3/31/20 21:00


 4/30/20 20:59  4/3/20 21:53


 


 


 Sumatriptan


 Succinate


  (Imitrex)  100 mg  DAILY  PRN


 ORAL


 For Pain  3/31/20 02:30


 4/30/20 02:29   


 


 


 Tamsulosin HCl


  (Flomax)  0.8 mg  BEDTIME


 ORAL


   3/31/20 21:00


 4/30/20 20:59  4/3/20 21:53


 








Allergies:  


Coded Allergies:  


     SHELLFISH DERIVED (Unverified  Allergy, Intermediate, ITCHING/HIVES, 7/25/ 14)


     IODINE (Unverified  Allergy, Unknown, 7/25/14)


Uncoded Allergies:  


     SPINACH (Allergy, Intermediate, SEVERE ITCHING/HIVES, 7/25/14)


Subjective


awake, alert, responsive, NAD, K: 3.8.





Objective





Last Vital Signs








  Date Time  Temp Pulse Resp B/P (MAP) Pulse Ox O2 Delivery O2 Flow Rate FiO2


 


4/3/20 16:00 97.9 100 19 137/83 (101) 96   


 


4/3/20 09:00      Nasal Cannula 3.0 











Laboratory Tests








Test


  4/3/20


06:30


 


White Blood Count


  8.4 K/UL


(4.8-10.8)


 


Red Blood Count


  4.33 M/UL


(4.70-6.10)  L


 


Hemoglobin


  14.3 G/DL


(14.2-18.0)


 


Hematocrit


  39.4 %


(42.0-52.0)  L


 


Mean Corpuscular Volume 91 FL (80-99)  


 


Mean Corpuscular Hemoglobin


  33.0 PG


(27.0-31.0)  H


 


Mean Corpuscular Hemoglobin


Concent 36.3 G/DL


(32.0-36.0)  H


 


Red Cell Distribution Width


  11.9 %


(11.6-14.8)


 


Platelet Count


  195 K/UL


(150-450)


 


Mean Platelet Volume


  6.9 FL


(6.5-10.1)


 


Neutrophils (%) (Auto)


  79.3 %


(45.0-75.0)  H


 


Lymphocytes (%) (Auto)


  7.3 %


(20.0-45.0)  L


 


Monocytes (%) (Auto)


  11.4 %


(1.0-10.0)  H


 


Eosinophils (%) (Auto)


  0.3 %


(0.0-3.0)


 


Basophils (%) (Auto)


  1.7 %


(0.0-2.0)


 


Sodium Level


  136 MMOL/L


(136-145)


 


Potassium Level


  3.8 MMOL/L


(3.5-5.1)


 


Chloride Level


  99 MMOL/L


()


 


Carbon Dioxide Level


  31 MMOL/L


(21-32)


 


Anion Gap


  6 mmol/L


(5-15)


 


Blood Urea Nitrogen


  11 mg/dL


(7-18)


 


Creatinine


  0.6 MG/DL


(0.55-1.30)


 


Estimat Glomerular Filtration


Rate > 60 mL/min


(>60)


 


Glucose Level


  117 MG/DL


()  H


 


Calcium Level


  9.4 MG/DL


(8.5-10.1)











Microbiology








 Date/Time


Source Procedure


Growth Status


 


 


 4/1/20 15:30


Stool Clostridium difficile Toxin Assay - Final Complete

















Intake and Output  


 


 4/2/20 4/3/20





 19:00 07:00


 


Intake Total 1520 ml 


 


Output Total 900 ml 1150 ml


 


Balance 620 ml -1150 ml


 


  


 


Intake Oral 1520 ml 


 


Output Urine Total 900 ml 1150 ml


 


# Voids  2


 


# Bowel Movements 4 3








Objective


General: No acute distress, awake and alert


HEENT: NCAT, sclera anicteric, PERRL, EOMI.


Neck: Supple, no significant jugular venous distention, 


Lungs: Fair inspiratory effort, clear to auscultation bilaterally, no Wheeze or 

Rales.


Heart: Regular rate and rhythm, normal S1/S2, no murmurs/gallops


Abdomen: soft, nontender, nondistended. Normoactive bowel sounds.


 / Rectal: Refused and deferred.


Extremities: No Cyanosis , clubbing or edema. 


Neuro: A&O x 3, Able to move all extremities


Skin: warm, + Facial rash.


Psych: Normal mood and affect





Assessment/Plan


Assessment/Plan


ASSESSMENT:  This is a 70-year-old male with:





1. Unsteady gait with Frequent falls.


2. Seizure disorder.


3. Benign prostatic hypertrophy.


4. Chronic obstructive pulmonary disease.


5.  Hypokalemia





TREATMENT:


1. Fall injury.  This may be secondary to orthostatic hypotension versus


acute cerebrovascular accident. 


2. Seizure disorder.  Continue Dilantin as above.


3. History of deep venous thrombosis/pulmonary embolism.  Hold Coumadin.


4. Cerebrovascular disease.  The patient denies history of stroke.


Continue aspirin as above.


5. Benign prostatic hypertrophy.  Continue Flomax as above.


6. Chronic obstructive pulmonary disease.  A Pulmonary consultation has


been obtained with Dr. Surekha Dewey.


7.  Oral potassium replacement





Physical therapy with gait training.


Code status: Full code


DVT Prophylaxis: Heparin SQ











Aron Padron MD Apr 3, 2020 23:37

## 2020-04-03 NOTE — NUR
NURSE NOTES:

pt is in the bed asleep. no SOB noted, o2 inplace. IV site on right hand intact, no acute 
distress noted at this time, placed call light within reach. side rails padded for seizure 
precaution.

## 2020-04-03 NOTE — PULMONOLOGY PROGRESS NOTE
Assessment/Plan


Problems:  


(1) Falls frequently


(2) Cerebellar atrophy


(3) Failure to thrive in adult


(4) Seizure disorder, complex partial


(5) ACS (acute coronary syndrome)


Assessment/Plan


no new comlaind





pt/ot note reviewed


neuro evaluation appreciated and reviewed


MRI of brain


calorie count


symptomatic treatment





MRI of Cspine pending


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 

since prior study of 8/30/2017





pt will benefit from skilled PT





Subjective


ROS Limited/Unobtainable:  No


Constitutional:  Reports: no symptoms


HEENT:  Repors: no symptoms


Allergies:  


Coded Allergies:  


     SHELLFISH DERIVED (Unverified  Allergy, Intermediate, ITCHING/HIVES, 7/25/ 14)


     IODINE (Unverified  Allergy, Unknown, 7/25/14)


Uncoded Allergies:  


     SPINACH (Allergy, Intermediate, SEVERE ITCHING/HIVES, 7/25/14)





Objective





Last 24 Hour Vital Signs








  Date Time  Temp Pulse Resp B/P (MAP) Pulse Ox O2 Delivery O2 Flow Rate FiO2


 


4/3/20 12:00 98.0 103 19 148/78 (101) 95   


 


4/3/20 09:00      Nasal Cannula 3.0 


 


4/3/20 08:00 98.8 105 18 134/83 (100) 93   


 


4/3/20 04:00 98.6 96 18 100/61 (74) 94   


 


4/3/20 00:00 98.4 91 18 119/52 (74) 94   


 


4/2/20 20:19      Nasal Cannula 3.0 


 


4/2/20 20:00 98.1 100 18 120/75 (90) 94   


 


4/2/20 16:00 98.7 87 20 135/77 (96) 95   

















Intake and Output  


 


 4/2/20 4/3/20





 19:00 07:00


 


Intake Total 1520 ml 


 


Output Total 900 ml 1150 ml


 


Balance 620 ml -1150 ml


 


  


 


Intake Oral 1520 ml 


 


Output Urine Total 900 ml 1150 ml


 


# Voids  2


 


# Bowel Movements 4 3








General Appearance:  WD/WN


HEENT:  normocephalic, atraumatic


Respiratory/Chest:  lungs clear, no respiratory distress


Cardiovascular:  normal peripheral pulses, regular rhythm


Abdomen:  normal bowel sounds, soft, non tender


Genitourinary:  normal external genitalia


Extremities:  no clubbing


Skin:  no lesions





Microbiology








 Date/Time


Source Procedure


Growth Status


 


 


 4/1/20 15:30


Stool Clostridium difficile Toxin Assay - Final Complete








Laboratory Tests


4/3/20 06:30: 


White Blood Count 8.4, Red Blood Count 4.33L, Hemoglobin 14.3, Hematocrit 39.4L

, Mean Corpuscular Volume 91, Mean Corpuscular Hemoglobin 33.0H, Mean 

Corpuscular Hemoglobin Concent 36.3H, Red Cell Distribution Width 11.9, 

Platelet Count 195, Mean Platelet Volume 6.9, Neutrophils (%) (Auto) 79.3H, 

Lymphocytes (%) (Auto) 7.3L, Monocytes (%) (Auto) 11.4H, Eosinophils (%) (Auto) 

0.3, Basophils (%) (Auto) 1.7, Sodium Level 136, Potassium Level 3.8, Chloride 

Level 99, Carbon Dioxide Level 31, Anion Gap 6, Blood Urea Nitrogen 11, 

Creatinine 0.6, Estimat Glomerular Filtration Rate > 60, Glucose Level 117H, 

Calcium Level 9.4





Current Medications








 Medications


  (Trade)  Dose


 Ordered  Sig/Eddie


 Route


 PRN Reason  Start Time


 Stop Time Status Last Admin


Dose Admin


 


 Acetaminophen


  (Tylenol)  650 mg  Q6H  PRN


 ORAL


 Mild Pain/Temp > 100.5  3/31/20 02:15


 4/30/20 02:14  4/2/20 10:01


 


 


 Amitriptyline HCl


  (Elavil)  25 mg  BEDTIME


 ORAL


   3/31/20 21:00


 4/30/20 20:59  4/2/20 21:45


 


 


 Clonidine HCl


  (Catapres Tab)  0.1 mg  Q4H  PRN


 ORAL


 sbp greater than 160  3/31/20 08:30


 6/29/20 08:29  3/31/20 09:02


 


 


 Dextrose/Sodium


 Chloride  1,000 ml @ 


 75 mls/hr  [id number]


 IV


   3/31/20 02:15


 4/30/20 02:14  4/3/20 09:28


 


 


 Heparin Sodium


  (Porcine)


  (Heparin 5000


 units/ml)  5,000 units  EVERY 8  HOURS


 SUBQ


   3/31/20 06:00


 5/15/20 05:59  4/3/20 05:55


 


 


 Latanoprost


  (Xalatan)  1 drop  BEDTIME


 BOTH EYES


   3/31/20 21:00


 4/30/20 20:59  4/2/20 21:45


 


 


 Lorazepam


  (Ativan 2mg/ml


 1ml)  0.5 mg  Q4H  PRN


 IV


 For Anxiety  4/1/20 13:45


 4/8/20 13:44   


 


 


 Pantoprazole


  (Protonix)  40 mg  DAILY


 ORAL


   3/31/20 09:00


 4/30/20 08:59  4/3/20 09:12


 


 


 Phenytoin


  (Dilantin)  100 mg  BEDTIME


 ORAL


   3/31/20 21:00


 4/30/20 20:59  4/2/20 21:45


 


 


 Phenytoin


  (Dilantin)  200 mg  DAILY


 ORAL


   3/31/20 09:00


 4/30/20 08:59  4/3/20 09:12


 


 


 Primidone


  (Mysoline)  250 mg  DAILY


 ORAL


   3/31/20 09:00


 4/30/20 08:59  4/3/20 09:14


 


 


 Primidone


  (Mysoline)  500 mg  BEDTIME


 ORAL


   3/31/20 21:00


 4/30/20 20:59  4/2/20 21:45


 


 


 Sumatriptan


 Succinate


  (Imitrex)  100 mg  DAILY  PRN


 ORAL


 For Pain  3/31/20 02:30


 4/30/20 02:29   


 


 


 Tamsulosin HCl


  (Flomax)  0.8 mg  BEDTIME


 ORAL


   3/31/20 21:00


 4/30/20 20:59  4/2/20 21:45


 

















Surekha Dewey MD Apr 3, 2020 13:45

## 2020-04-03 NOTE — NUR
*-* INSURANCE *-*



ALL AVAILABLE CLINICALS HAVE BEEN FAXED TO: 



[facility]

P: 

F: [phone] (FAX ALL CLINICALS)

## 2020-04-03 NOTE — NUR
DISCHARGE PLANNING



SPOKE WITH DR RODRIGUEZ, GAVE TELEPHONE ORDER TO DISCHARGE PATIENT HOME. NOTED AND CARRIED OUT. 
TERESITA CURIEL INFORMED.

## 2020-04-03 NOTE — NUR
NURSE NOTES:

Received report from TERESITA Lemos. Pt is sleeping but easily arousable, lying semi-hodges's; 
comfortably resting. No signs of acute distress noted. Pt denies any pain at this time. 
AOx3; able to make needs known. Checked IV site, line, and rate; patent and running. No 
bleeding or infiltration noted. Perianal redness noted. Bed at lowest position. Brakes on. 
Siderails up x2 and padded. Call light within reach. Will continue to monitor.

## 2020-04-03 NOTE — NUR
RD ASSESSMENT & RECOMMENDATIONS

SEE CARE ACTIVITY FOR COMPLETE ASSESSMENT



DAILY ESTIMATED NEEDS:

Needs based on Pulmonary 79kg  

25-30  kcals/kg 

3545-5507  total kcals

1-1.5  g protein/kg

  g total protein 

25-30  mL/kg

4301-1947  total fluid mLs



NUTRITION DIAGNOSIS:

Altered nutrition related lab values r/t clinical status as evidenced by

low K (3.0), low phos (2.2), elev LFT's and Creat kinase.



CURRENT DIET: Regular     



PO DIET RECOMMENDATIONS:

With consistent po intake >75%, rec Low Na diet  



ADDITIONAL RECOMMENDATIONS:

1) Monitor po intake, need for soft easy chew  

2) F/up w/ Kcal count  

3) Check lytes daily, replete as needed (low K, phos) 

4) Obtain a calibrated bed scale wt  



48 HOUR KCAL COUNT: 

(4/3) Kcal count incomplete, inaccurate data. 

PO intake appears fair to good per EMR: 75 50 75 50 50 75%. 

Will maintain snacks in b/w meals, pt does not want Ensure.

## 2020-04-04 NOTE — NUR
NURSE NOTES:

Report received from Laly LO. Patient is awake and alert x 3. Patient noted to be on 
oxygen via nasal canula. Patient denies shortness of breath. Patient denies chest pain. 
Patient noted to have 20 jesse IV in right hand with fluids running per MD orders. Patient 
recognized as a high fall risk due to weakness and fall prior to admission. Educated patient 
to call when needs assistance. Patient verbalized understanding. Nursing assistant made 
aware. Bed locked, alarmed, in lowest position. Call light in reach. Will preform frequent 
checks. Will continue to follow plan of care.

## 2020-04-04 NOTE — NUR
HAND-OFF: 

Report given to TERESITA Lew. Pt is awake and in stable condition. Plan of care endorsed.

## 2020-04-04 NOTE — INTERNAL MED PROGRESS NOTE
Subjective


Date of Service:  Apr 4, 2020


Physician Name


WindyDominic


Attending Physician


Aron Padron MD





Current Medications








 Medications


  (Trade)  Dose


 Ordered  Sig/Eddie


 Route


 PRN Reason  Start Time


 Stop Time Status Last Admin


Dose Admin


 


 Acetaminophen


  (Tylenol)  650 mg  Q6H  PRN


 ORAL


 Mild Pain/Temp > 100.5  3/31/20 02:15


 4/30/20 02:14  4/4/20 08:29


 


 


 Amitriptyline HCl


  (Elavil)  25 mg  BEDTIME


 ORAL


   3/31/20 21:00


 4/30/20 20:59  4/3/20 21:53


 


 


 Clonidine HCl


  (Catapres Tab)  0.1 mg  Q4H  PRN


 ORAL


 sbp greater than 160  3/31/20 08:30


 6/29/20 08:29  3/31/20 09:02


 


 


 Dextrose/Sodium


 Chloride  1,000 ml @ 


 75 mls/hr  [id number]


 IV


   3/31/20 02:15


 4/30/20 02:14  4/4/20 12:36


 


 


 Heparin Sodium


  (Porcine)


  (Heparin 5000


 units/ml)  5,000 units  EVERY 8  HOURS


 SUBQ


   3/31/20 06:00


 5/15/20 05:59  4/4/20 14:58


 


 


 Latanoprost


  (Xalatan)  1 drop  BEDTIME


 BOTH EYES


   3/31/20 21:00


 4/30/20 20:59  4/3/20 22:32


 


 


 Lorazepam


  (Ativan 2mg/ml


 1ml)  0.5 mg  Q4H  PRN


 IV


 For Anxiety  4/1/20 13:45


 4/8/20 13:44   


 


 


 Pantoprazole


  (Protonix)  40 mg  DAILY


 ORAL


   3/31/20 09:00


 4/30/20 08:59  4/4/20 08:28


 


 


 Phenytoin


  (Dilantin)  100 mg  BEDTIME


 ORAL


   3/31/20 21:00


 4/30/20 20:59  4/3/20 21:52


 


 


 Phenytoin


  (Dilantin)  200 mg  DAILY


 ORAL


   3/31/20 09:00


 4/30/20 08:59  4/4/20 08:30


 


 


 Primidone


  (Mysoline)  250 mg  DAILY


 ORAL


   3/31/20 09:00


 4/30/20 08:59  4/4/20 08:29


 


 


 Primidone


  (Mysoline)  500 mg  BEDTIME


 ORAL


   3/31/20 21:00


 4/30/20 20:59  4/3/20 21:53


 


 


 Sumatriptan


 Succinate


  (Imitrex)  100 mg  DAILY  PRN


 ORAL


 For Pain  3/31/20 02:30


 4/30/20 02:29   


 


 


 Tamsulosin HCl


  (Flomax)  0.8 mg  BEDTIME


 ORAL


   3/31/20 21:00


 4/30/20 20:59  4/3/20 21:53


 








Allergies:  


Coded Allergies:  


     SHELLFISH DERIVED (Unverified  Allergy, Intermediate, ITCHING/HIVES, 7/25/ 14)


     IODINE (Unverified  Allergy, Unknown, 7/25/14)


Uncoded Allergies:  


     SPINACH (Allergy, Intermediate, SEVERE ITCHING/HIVES, 7/25/14)


ROS Limited/Unobtainable:  No


Constitutional:  Reports: no symptoms


HEENT:  Reports: no symptoms


Cardiovascular:  Reports: no symptoms


Respiratory:  Reports: no symptoms


Gastrointestinal/Abdominal:  Reports: no symptoms


Genitourinary:  Reports: no symptoms


Neurologic/Psychiatric:  Reports: no symptoms


Subjective


71YO M with H/O seizure D/O admitted with frequent falls.  Cover for [facility] Med-Dr Padron





Objective





Last Vital Signs








  Date Time  Temp Pulse Resp B/P (MAP) Pulse Ox O2 Delivery O2 Flow Rate FiO2


 


4/4/20 12:52      Room Air  


 


4/4/20 12:00 99.5 91 18 140/84 (102) 96   


 


4/3/20 09:00       3.0 











Microbiology








 Date/Time


Source Procedure


Growth Status


 


 


 4/1/20 15:30


Stool Clostridium difficile Toxin Assay - Final Complete

















Intake and Output  


 


 4/3/20 4/4/20





 19:00 07:00


 


Intake Total 500 ml 75 ml


 


Output Total  500 ml


 


Balance 500 ml -425 ml


 


  


 


Intake Oral 500 ml 


 


IV Total  75 ml


 


Output Urine Total  500 ml


 


# Voids  1


 


# Bowel Movements 1 








Objective


PHYSICAL EXAMINATION:


GENERAL:  The patient is a well-developed and well-nourished 


male, in no apparent distress.


HEENT:  Eyes, pupils are equal and responsive to light and accommodation.


Extraocular movements are intact.


NECK:  Supple without lymphadenopathy.


CHEST:  Lungs are clear to auscultation bilaterally without wheeze or


rales.


CARDIOVASCULAR:  Regular rate.  S1, S2 are normal without murmurs, rubs, or


gallops.


ABDOMEN:  Soft, nontender, and nondistended.  Positive bowel sounds.  No


evidence of hepatosplenomegaly.  Currently, no rebound or guarding


noted.


EXTREMITIES:  Negative for clubbing, cyanosis, or edema.


RECTAL/GENITAL:  Refused.


NEUROLOGIC:  Cranial nerves II through XII are grossly intact without focal


deficits.  Motor strength is 5/5 bilaterally.  Deep tendon reflexes are 2+


plantar.





Assessment/Plan


Assessment/Plan


ASSESSMENT:  This is a 70-year-old male with:





1. Frequent falls.


2. Seizure disorder.


3. Benign prostatic hypertrophy.


4. Chronic obstructive pulmonary disease.


5.  Hypokalemia





TREATMENT:


1. Fall injury.  This may be secondary to orthostatic hypotension versus


acute cerebrovascular accident.  Carotid duplex Dopplers of the brain are


pending.


2. Seizure disorder.  Continue Dilantin as above.


3. History of deep venous thrombosis/pulmonary embolism.  Continue


Coumadin as above.


4. Cerebrovascular disease.  The patient denies history of stroke.


Continue aspirin as above.


5. Benign prostatic hypertrophy.  Continue Flomax as above.


6. Chronic obstructive pulmonary disease.  A Pulmonary consultation has


been obtained with Dr. Surekha Dewey.


7.  Oral potassium replacement


8.  Discharge planning:  [facility]











Dominic Temple MD Apr 4, 2020 15:05

## 2020-04-04 NOTE — NUR
NURSE NOTES:

Received report from TERESITA Lew. AAO x 3, on NC 3L. IV site intact and running IVF. Condom 
cath intact. Denture and walker at bedside. No acute distress noted. Educated pt to use call 
light and verbalized understanding. Bed locked, lowest position, alarm on, side rails up, 
call light within reach. Will continue to monitor.

## 2020-04-04 NOTE — PULMONOLOGY PROGRESS NOTE
Assessment/Plan


Problems:  


(1) Falls frequently


(2) Cerebellar atrophy


(3) Failure to thrive in adult


(4) Seizure disorder, complex partial


(5) ACS (acute coronary syndrome)


Assessment/Plan


no new comlaind





pt/ot note reviewed


neuro evaluation appreciated and reviewed


MRI of brain


calorie count


symptomatic treatment





MRI of Cspine pending


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 

since prior study of 8/30/2017





pt will benefit from skilled PT


awaiting for the [facility] to fax the case management the contracted facilities





Subjective


Interval Events:  no new complains


Allergies:  


Coded Allergies:  


     SHELLFISH DERIVED (Unverified  Allergy, Intermediate, ITCHING/HIVES, 7/25/ 14)


     IODINE (Unverified  Allergy, Unknown, 7/25/14)


Uncoded Allergies:  


     SPINACH (Allergy, Intermediate, SEVERE ITCHING/HIVES, 7/25/14)





Objective





Last 24 Hour Vital Signs








  Date Time  Temp Pulse Resp B/P (MAP) Pulse Ox O2 Delivery O2 Flow Rate FiO2


 


4/4/20 04:00 97.8 95 19 142/75 (97) 96   


 


4/4/20 00:00 97.9 84 19 130/76 (94) 96   


 


4/3/20 21:00      Room Air  


 


4/3/20 20:00 98.2 88 20 132/72 (92) 97   


 


4/3/20 16:00 97.9 100 19 137/83 (101) 96   


 


4/3/20 12:00 98.0 103 19 148/78 (101) 95   


 


4/3/20 09:00      Nasal Cannula 3.0 


 


4/3/20 08:00 98.8 105 18 134/83 (100) 93   

















Intake and Output  


 


 4/3/20 4/4/20





 19:00 07:00


 


Intake Total 500 ml 


 


Output Total  500 ml


 


Balance 500 ml -500 ml


 


  


 


Intake Oral 500 ml 


 


Output Urine Total  500 ml


 


# Voids  1


 


# Bowel Movements 1 








Objective


General Appearance:  WD/WN


HEENT:  normocephalic


Respiratory/Chest:  chest wall non-tender, lungs clear


Cardiovascular:  normal peripheral pulses, normal rate, 


Abdomen:  soft, non tender, non distended


Extremities:  no clubbing


Skin:  no rash, no lesions





Microbiology








 Date/Time


Source Procedure


Growth Status


 


 


 4/1/20 15:30


Stool Clostridium difficile Toxin Assay - Final Complete











Current Medications








 Medications


  (Trade)  Dose


 Ordered  Sig/Eddie


 Route


 PRN Reason  Start Time


 Stop Time Status Last Admin


Dose Admin


 


 Acetaminophen


  (Tylenol)  650 mg  Q6H  PRN


 ORAL


 Mild Pain/Temp > 100.5  3/31/20 02:15


 4/30/20 02:14  4/2/20 10:01


 


 


 Amitriptyline HCl


  (Elavil)  25 mg  BEDTIME


 ORAL


   3/31/20 21:00


 4/30/20 20:59  4/3/20 21:53


 


 


 Clonidine HCl


  (Catapres Tab)  0.1 mg  Q4H  PRN


 ORAL


 sbp greater than 160  3/31/20 08:30


 6/29/20 08:29  3/31/20 09:02


 


 


 Dextrose/Sodium


 Chloride  1,000 ml @ 


 75 mls/hr  [id number]


 IV


   3/31/20 02:15


 4/30/20 02:14  4/4/20 00:25


 


 


 Heparin Sodium


  (Porcine)


  (Heparin 5000


 units/ml)  5,000 units  EVERY 8  HOURS


 SUBQ


   3/31/20 06:00


 5/15/20 05:59  4/4/20 06:09


 


 


 Latanoprost


  (Xalatan)  1 drop  BEDTIME


 BOTH EYES


   3/31/20 21:00


 4/30/20 20:59  4/3/20 22:32


 


 


 Lorazepam


  (Ativan 2mg/ml


 1ml)  0.5 mg  Q4H  PRN


 IV


 For Anxiety  4/1/20 13:45


 4/8/20 13:44   


 


 


 Pantoprazole


  (Protonix)  40 mg  DAILY


 ORAL


   3/31/20 09:00


 4/30/20 08:59  4/3/20 09:12


 


 


 Phenytoin


  (Dilantin)  100 mg  BEDTIME


 ORAL


   3/31/20 21:00


 4/30/20 20:59  4/3/20 21:52


 


 


 Phenytoin


  (Dilantin)  200 mg  DAILY


 ORAL


   3/31/20 09:00


 4/30/20 08:59  4/3/20 09:12


 


 


 Primidone


  (Mysoline)  250 mg  DAILY


 ORAL


   3/31/20 09:00


 4/30/20 08:59  4/3/20 09:14


 


 


 Primidone


  (Mysoline)  500 mg  BEDTIME


 ORAL


   3/31/20 21:00


 4/30/20 20:59  4/3/20 21:53


 


 


 Sumatriptan


 Succinate


  (Imitrex)  100 mg  DAILY  PRN


 ORAL


 For Pain  3/31/20 02:30


 4/30/20 02:29   


 


 


 Tamsulosin HCl


  (Flomax)  0.8 mg  BEDTIME


 ORAL


   3/31/20 21:00


 4/30/20 20:59  4/3/20 21:53


 

















Surekha Dewey MD Apr 4, 2020 07:51

## 2020-04-05 NOTE — NUR
NURSE NOTES:

Received patient in bed, report is given by Cailin LO. Patient is awake, alert, oriented 
x3, bedbound, bu has a walker at the bedside, patient is a high fall risk, was educated to 
use call light for any needs or concerns, staff on the floor are aware of his fall risk and 
frequent rounds will be made. IV site is clean dry and intact. Call light is within reach, 
bed is lowered, locked, alarm is on, will continue to monitor for comfort and safety.

## 2020-04-05 NOTE — NUR
NURSE NOTES:

Patient stated that he has medications brought from home in the pocket of his pants, 
inspected pants for any sign of medications, there were none, made patient aware. 

Patient stated that he wants his valuables to be in his possessions at all times, CN was 
made aware. Patient has refused to send valuables to the safety lock box at this time.

## 2020-04-05 NOTE — NUR
NURSE NOTES:

received patient in bed, awake, alert, no complaint of pain or discomfort. On 2L O2/min, no 
sign of respiratory distress. Receives IVF through right hand access, no sign of leakage or 
infiltration noted. noted external urinary catheter draining to gravity straw color urine. 
Bed locked at the lowest position possible, call light within easy reach, siderails up x2, 
padded for seizure prevention. Patient is Fall risk. Will continue to monitor patient and 
follow up with the plan of care.

## 2020-04-05 NOTE — NUR
CHARGE NURSE NOTE:

Pt was having a physical therapy cession with Miss Ching PT. CN was at the nursing station. 
Miss Ching came to the nursing station saying  that pt is coughing so she needs a mask. ( 
Miss Ching was wearing a surg.mask already). PT and CN were rushing to pt's room due to 
noise. CN and PT saw a witnessed fall. Fanny Choi CNA and Summer Acosta RN were trying 
to prevent fall, they were in the room,  but it happened so fast, they were not able to do 
it. Pt did not get any injury.   During  s/p fall investigation- Humza PT placed risk fall pt 
on the edge of the bed, left him to get a mask, pt stood up and fell, but do not hit the 
floor, was in the sitting position. Pt neurocheck was done- WNL, no pain.  and 
Nursing Supervisor Teodoro notified. Dr. Padron did not order CT. Continue monitor patient, 
following a fall protocol.

## 2020-04-05 NOTE — INTERNAL MED PROGRESS NOTE
Subjective


Date of Service:  Apr 5, 2020


Physician Name


WindyDominic


Attending Physician


Aron Padron MD





Current Medications








 Medications


  (Trade)  Dose


 Ordered  Sig/Eddie


 Route


 PRN Reason  Start Time


 Stop Time Status Last Admin


Dose Admin


 


 Acetaminophen


  (Tylenol)  650 mg  Q6H  PRN


 ORAL


 Mild Pain/Temp > 100.5  3/31/20 02:15


 4/30/20 02:14  4/4/20 08:29


 


 


 Amitriptyline HCl


  (Elavil)  25 mg  BEDTIME


 ORAL


   3/31/20 21:00


 4/30/20 20:59  4/4/20 20:51


 


 


 Clonidine HCl


  (Catapres Tab)  0.1 mg  Q4H  PRN


 ORAL


 sbp greater than 160  3/31/20 08:30


 6/29/20 08:29  3/31/20 09:02


 


 


 Dextrose/Sodium


 Chloride  1,000 ml @ 


 75 mls/hr  [id number]


 IV


   3/31/20 02:15


 4/30/20 02:14  4/5/20 16:09


 


 


 Heparin Sodium


  (Porcine)


  (Heparin 5000


 units/ml)  5,000 units  EVERY 8  HOURS


 SUBQ


   3/31/20 06:00


 5/15/20 05:59  4/5/20 16:07


 


 


 Latanoprost


  (Xalatan)  1 drop  BEDTIME


 BOTH EYES


   3/31/20 21:00


 4/30/20 20:59  4/4/20 20:53


 


 


 Lorazepam


  (Ativan 2mg/ml


 1ml)  0.5 mg  Q4H  PRN


 IV


 For Anxiety  4/1/20 13:45


 4/8/20 13:44   


 


 


 Pantoprazole


  (Protonix)  40 mg  DAILY


 ORAL


   3/31/20 09:00


 4/30/20 08:59  4/5/20 08:31


 


 


 Phenytoin


  (Dilantin)  100 mg  BEDTIME


 ORAL


   3/31/20 21:00


 4/30/20 20:59  4/4/20 20:51


 


 


 Phenytoin


  (Dilantin)  200 mg  DAILY


 ORAL


   3/31/20 09:00


 4/30/20 08:59  4/5/20 08:31


 


 


 Primidone


  (Mysoline)  250 mg  DAILY


 ORAL


   3/31/20 09:00


 4/30/20 08:59  4/5/20 08:30


 


 


 Primidone


  (Mysoline)  500 mg  BEDTIME


 ORAL


   3/31/20 21:00


 4/30/20 20:59  4/4/20 20:52


 


 


 Sumatriptan


 Succinate


  (Imitrex)  100 mg  DAILY  PRN


 ORAL


 For Pain  3/31/20 02:30


 4/30/20 02:29   


 


 


 Tamsulosin HCl


  (Flomax)  0.8 mg  BEDTIME


 ORAL


   3/31/20 21:00


 4/30/20 20:59  4/4/20 20:51


 








Allergies:  


Coded Allergies:  


     SHELLFISH DERIVED (Unverified  Allergy, Intermediate, ITCHING/HIVES, 7/25/ 14)


     IODINE (Unverified  Allergy, Unknown, 7/25/14)


Uncoded Allergies:  


     SPINACH (Allergy, Intermediate, SEVERE ITCHING/HIVES, 7/25/14)


ROS Limited/Unobtainable:  No


Constitutional:  Reports: no symptoms


HEENT:  Reports: no symptoms


Cardiovascular:  Reports: no symptoms


Respiratory:  Reports: no symptoms


Gastrointestinal/Abdominal:  Reports: no symptoms


Genitourinary:  Reports: no symptoms


Subjective


71YO M with H/O seizure D/O admitted with frequent falls.  Cover for Count includes the [facility] Med-Dr Padron





Objective





Last Vital Signs








  Date Time  Temp Pulse Resp B/P (MAP) Pulse Ox O2 Delivery O2 Flow Rate FiO2


 


4/5/20 17:00 98.4 84 18 148/84 (105) 97   


 


4/5/20 09:00      Room Air  


 


4/3/20 09:00       3.0 

















Intake and Output  


 


 4/4/20 4/5/20





 19:00 07:00


 


Intake Total 750 ml 675 ml


 


Output Total  1600 ml


 


Balance 750 ml -925 ml


 


  


 


IV Total 750 ml 675 ml


 


Output Urine Total  1600 ml


 


# Bowel Movements 2 1








Objective


PHYSICAL EXAMINATION:


GENERAL:  The patient is a well-developed and well-nourished 


male, in no apparent distress.


HEENT:  Eyes, pupils are equal and responsive to light and accommodation.


Extraocular movements are intact.


NECK:  Supple without lymphadenopathy.


CHEST:  Lungs are clear to auscultation bilaterally without wheeze or


rales.


CARDIOVASCULAR:  Regular rate.  S1, S2 are normal without murmurs, rubs, or


gallops.


ABDOMEN:  Soft, nontender, and nondistended.  Positive bowel sounds.  No


evidence of hepatosplenomegaly.  Currently, no rebound or guarding


noted.


EXTREMITIES:  Negative for clubbing, cyanosis, or edema.


RECTAL/GENITAL:  Refused.


NEUROLOGIC:  Cranial nerves II through XII are grossly intact without focal


deficits.  Motor strength is 5/5 bilaterally.  Deep tendon reflexes are 2+


plantar.





Assessment/Plan


Assessment/Plan


ASSESSMENT:  This is a 70-year-old male with:





1. Frequent falls.


2. Seizure disorder.


3. Benign prostatic hypertrophy.


4. Chronic obstructive pulmonary disease.


5.  Hypokalemia





TREATMENT:


1. Fall injury.  This may be secondary to orthostatic hypotension versus


acute cerebrovascular accident.  Carotid duplex Dopplers of the brain are


pending.


2. Seizure disorder.  Continue Dilantin as above.


3. History of deep venous thrombosis/pulmonary embolism.  Continue


Coumadin as above.


4. Cerebrovascular disease.  The patient denies history of stroke.


Continue aspirin as above.


5. Benign prostatic hypertrophy.  Continue Flomax as above.


6. Chronic obstructive pulmonary disease.  A Pulmonary consultation has


been obtained with Dr. Surekha Dewey.


7.  Oral potassium replacement


8.  Discharge planning:  [facility]











Dominic Temple MD Apr 5, 2020 18:15

## 2020-04-05 NOTE — NUR
CHARGE NURSE NOTE:

S/p witnessed fall 1200 4/5/20. Pt is in stable condition.  notified, no CT head 
order given. Next of kin- Schuyler Bull was called, unable to leave a message.

## 2020-04-05 NOTE — PULMONOLOGY PROGRESS NOTE
Assessment/Plan


Problems:  


(1) Falls frequently


(2) Cerebellar atrophy


(3) Failure to thrive in adult


(4) Seizure disorder, complex partial


(5) ACS (acute coronary syndrome)


Assessment/Plan


no new comlaind





pt/ot note reviewed


neuro evaluation appreciated and reviewed


MRI of C-spine


   Impression: No acute bony trauma


   Mild multilevel degenerative changes, as detailed above.





calorie count


symptomatic treatment





MRI of Cspine reviewed


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 

since prior study of 8/30/2017





pt will benefit from skilled PT


awaiting for the [facility] to fax the case management the contracted facilities





Subjective


ROS Limited/Unobtainable:  No


Constitutional:  Reports: no symptoms


HEENT:  Repors: no symptoms


Respiratory:  Reports: no symptoms


Allergies:  


Coded Allergies:  


     SHELLFISH DERIVED (Unverified  Allergy, Intermediate, ITCHING/HIVES, 7/25/ 14)


     IODINE (Unverified  Allergy, Unknown, 7/25/14)


Uncoded Allergies:  


     SPINACH (Allergy, Intermediate, SEVERE ITCHING/HIVES, 7/25/14)





Objective





Last 24 Hour Vital Signs








  Date Time  Temp Pulse Resp B/P (MAP) Pulse Ox O2 Delivery O2 Flow Rate FiO2


 


4/5/20 03:51 97.2 90 20 138/73 (94) 94   


 


4/4/20 23:57 98.5 91 20 143/75 (97) 88   


 


4/4/20 21:00      Room Air  


 


4/4/20 20:00 98.0 98 19 154/79 (104) 94   


 


4/4/20 16:00 98.1 87 19 133/52 (79) 97   


 


4/4/20 12:52      Room Air  


 


4/4/20 12:00 99.5 91 18 140/84 (102) 96   


 


4/4/20 09:00      Room Air  


 


4/4/20 08:00 99.0 91 18 150/76 (100) 96   

















Intake and Output  


 


 4/4/20 4/5/20





 19:00 07:00


 


Intake Total 750 ml 675 ml


 


Output Total  1600 ml


 


Balance 750 ml -925 ml


 


  


 


IV Total 750 ml 675 ml


 


Output Urine Total  1600 ml


 


# Bowel Movements 2 1








Objective


General Appearance:  WD/WN


HEENT:  normocephalic


Respiratory/Chest:  chest wall non-tender, lungs clear


Cardiovascular:  normal peripheral pulses, normal rate, 


Abdomen:  soft, non tender, non distended


Extremities:  no clubbing


Skin:  no rash, no lesions





Current Medications








 Medications


  (Trade)  Dose


 Ordered  Sig/Eddie


 Route


 PRN Reason  Start Time


 Stop Time Status Last Admin


Dose Admin


 


 Acetaminophen


  (Tylenol)  650 mg  Q6H  PRN


 ORAL


 Mild Pain/Temp > 100.5  3/31/20 02:15


 4/30/20 02:14  4/4/20 08:29


 


 


 Amitriptyline HCl


  (Elavil)  25 mg  BEDTIME


 ORAL


   3/31/20 21:00


 4/30/20 20:59  4/4/20 20:51


 


 


 Clonidine HCl


  (Catapres Tab)  0.1 mg  Q4H  PRN


 ORAL


 sbp greater than 160  3/31/20 08:30


 6/29/20 08:29  3/31/20 09:02


 


 


 Dextrose/Sodium


 Chloride  1,000 ml @ 


 75 mls/hr  [id number]


 IV


   3/31/20 02:15


 4/30/20 02:14  4/5/20 02:30


 


 


 Heparin Sodium


  (Porcine)


  (Heparin 5000


 units/ml)  5,000 units  EVERY 8  HOURS


 SUBQ


   3/31/20 06:00


 5/15/20 05:59  4/5/20 05:09


 


 


 Latanoprost


  (Xalatan)  1 drop  BEDTIME


 BOTH EYES


   3/31/20 21:00


 4/30/20 20:59  4/4/20 20:53


 


 


 Lorazepam


  (Ativan 2mg/ml


 1ml)  0.5 mg  Q4H  PRN


 IV


 For Anxiety  4/1/20 13:45


 4/8/20 13:44   


 


 


 Pantoprazole


  (Protonix)  40 mg  DAILY


 ORAL


   3/31/20 09:00


 4/30/20 08:59  4/4/20 08:28


 


 


 Phenytoin


  (Dilantin)  100 mg  BEDTIME


 ORAL


   3/31/20 21:00


 4/30/20 20:59  4/4/20 20:51


 


 


 Phenytoin


  (Dilantin)  200 mg  DAILY


 ORAL


   3/31/20 09:00


 4/30/20 08:59  4/4/20 08:30


 


 


 Primidone


  (Mysoline)  250 mg  DAILY


 ORAL


   3/31/20 09:00


 4/30/20 08:59  4/4/20 08:29


 


 


 Primidone


  (Mysoline)  500 mg  BEDTIME


 ORAL


   3/31/20 21:00


 4/30/20 20:59  4/4/20 20:52


 


 


 Sumatriptan


 Succinate


  (Imitrex)  100 mg  DAILY  PRN


 ORAL


 For Pain  3/31/20 02:30


 4/30/20 02:29   


 


 


 Tamsulosin HCl


  (Flomax)  0.8 mg  BEDTIME


 ORAL


   3/31/20 21:00


 4/30/20 20:59  4/4/20 20:51


 

















Surekha Dewey MD Apr 5, 2020 07:41

## 2020-04-06 NOTE — NUR
CASE MANAGEMENT: REVIEW



04/03/20/20



HX: COPD AND SEIZURE



SI:MULTIPLE INJURIES DUE TO TRAMA . ACUTE HEAD INJURY .  FAILURE TO THRIVE . COPD

98.8   105   18   134/83   93% ON 3L NC

      





IS:IV D5@75ML/HR

DILANTIN PO QD

K-DUR PO QD

MYSOLINE PO QD

HEPARIN SQ TID

PROTONIX PO QD

CLONIDINE Q4HR/PRN 

DILANTIN PO QD

FLOMAX PO QHS



\**: 4E MED SURG UNIT 

DCP:HOME WHEN STABLE 



CASE MANAGEMENT: REVIEW



04/04/20



HX: COPD AND SEIZURE



SI:MULTIPLE INJURIES DUE TO TRAMA . ACUTE HEAD INJURY .  FAILURE TO THRIVE . COPD

99.0   91   18   150/76   96% ON RA

  





IS:IV D5@75ML/HR

DILANTIN PO QD

K-DUR PO QD

MYSOLINE PO QD

HEPARIN SQ TID

PROTONIX PO QD

CLONIDINE Q4HR/PRN 

DILANTIN PO QD

FLOMAX PO QHS



\**: 4E MED SURG UNIT 

DCP:HOME WHEN STABLE 





CASE MANAGEMENT: REVIEW



04/05/20/20



HX: COPD AND SEIZURE



SI:MULTIPLE INJURIES DUE TO TRAMA . ACUTE HEAD INJURY .  FAILURE TO THRIVE . COPD

97.9   81   18   155/78   93% ON RA

  





IS:IV D5@75ML/HR

DILANTIN PO QD

K-DUR PO QD

MYSOLINE PO QD

HEPARIN SQ TID

PROTONIX PO QD

CLONIDINE Q4HR/PRN 

DILANTIN PO QD

FLOMAX PO QHS



\**: 4E MED SURG UNIT 

DCP:HOME WHEN STABLE 





CASE MANAGEMENT: REVIEW



04/06/20/20



HX: COPD AND SEIZURE



SI:MULTIPLE INJURIES DUE TO TRAMA . ACUTE HEAD INJURY .  FAILURE TO THRIVE . COPD

98.1   93   17   146/78    98% ON RA

K+3.0    PHOS 2.3   AST/MKM000/123  ALKP 317  CREC PRO 28.3 





IS:IV D5@75ML/HR

DILANTIN PO QD

K-DUR PO QD

MYSOLINE PO QD

HEPARIN SQ TID

PROTONIX PO QD

CLONIDINE Q4HR/PRN 

DILANTIN PO QD

FLOMAX PO QHS



\**: 4E MED SURG UNIT 

DCP:SNF



PLAN: 

MONITOR GAIT

FELL 4/5/20

ANTICIPATED DC IN AM

## 2020-04-06 NOTE — NUR
DISCHARGE PLANNING:

PATIENT REFERRED TO:



1. [facility]

T: [phone]



2. [facility] 

T:[phone]



3. [facility]

T: [phone]



[facility]URANCE [facility] PROVIDED 

[facility]

T:[phone]

2. [facility] 

[phone]



WILL FOLLOW UP

## 2020-04-06 NOTE — NUR
*-* INSURANCE *-*



ALL AVAILABLE CLINICALS HAVE BEEN FAXED TO: 



[facility]

P: 

F: [phone] (FAX ALL CLINICALS)

## 2020-04-06 NOTE — INTERNAL MED PROGRESS NOTE
Subjective


Date of Service:  Apr 6, 2020


Physician Name


Dominic Temple


Attending Physician


Aron Padron MD





Current Medications








 Medications


  (Trade)  Dose


 Ordered  Sig/Eddie


 Route


 PRN Reason  Start Time


 Stop Time Status Last Admin


Dose Admin


 


 Acetaminophen


  (Tylenol)  650 mg  Q6H  PRN


 ORAL


 Mild Pain/Temp > 100.5  3/31/20 02:15


 4/30/20 02:14  4/4/20 08:29


 


 


 Amitriptyline HCl


  (Elavil)  25 mg  BEDTIME


 ORAL


   3/31/20 21:00


 4/30/20 20:59  4/5/20 20:42


 


 


 Clonidine HCl


  (Catapres Tab)  0.1 mg  Q4H  PRN


 ORAL


 sbp greater than 160  3/31/20 08:30


 6/29/20 08:29  3/31/20 09:02


 


 


 Dextrose/Sodium


 Chloride  1,000 ml @ 


 75 mls/hr  [id number]


 IV


   3/31/20 02:15


 4/30/20 02:14  4/6/20 05:37


 


 


 Heparin Sodium


  (Porcine)


  (Heparin 5000


 units/ml)  5,000 units  EVERY 8  HOURS


 SUBQ


   3/31/20 06:00


 5/15/20 05:59  4/6/20 06:03


 


 


 Latanoprost


  (Xalatan)  1 drop  BEDTIME


 BOTH EYES


   3/31/20 21:00


 4/30/20 20:59  4/5/20 20:45


 


 


 Lorazepam


  (Ativan 2mg/ml


 1ml)  0.5 mg  Q4H  PRN


 IV


 For Anxiety  4/1/20 13:45


 4/8/20 13:44  4/6/20 00:14


 


 


 Pantoprazole


  (Protonix)  40 mg  DAILY


 ORAL


   3/31/20 09:00


 4/30/20 08:59  4/6/20 09:53


 


 


 Phenytoin


  (Dilantin)  100 mg  BEDTIME


 ORAL


   3/31/20 21:00


 4/30/20 20:59  4/5/20 20:41


 


 


 Phenytoin


  (Dilantin)  200 mg  DAILY


 ORAL


   3/31/20 09:00


 4/30/20 08:59  4/6/20 09:53


 


 


 Primidone


  (Mysoline)  250 mg  DAILY


 ORAL


   3/31/20 09:00


 4/30/20 08:59  4/6/20 09:52


 


 


 Primidone


  (Mysoline)  500 mg  BEDTIME


 ORAL


   3/31/20 21:00


 4/30/20 20:59  4/5/20 20:41


 


 


 Sumatriptan


 Succinate


  (Imitrex)  100 mg  DAILY  PRN


 ORAL


 For Pain  3/31/20 02:30


 4/30/20 02:29   


 


 


 Tamsulosin HCl


  (Flomax)  0.8 mg  BEDTIME


 ORAL


   3/31/20 21:00


 4/30/20 20:59  4/5/20 20:41


 








Allergies:  


Coded Allergies:  


     SHELLFISH DERIVED (Unverified  Allergy, Intermediate, ITCHING/HIVES, 7/25/ 14)


     IODINE (Unverified  Allergy, Unknown, 7/25/14)


Uncoded Allergies:  


     SPINACH (Allergy, Intermediate, SEVERE ITCHING/HIVES, 7/25/14)


ROS Limited/Unobtainable:  No


Constitutional:  Reports: no symptoms


HEENT:  Reports: no symptoms


Cardiovascular:  Reports: no symptoms


Respiratory:  Reports: no symptoms


Gastrointestinal/Abdominal:  Reports: no symptoms


Genitourinary:  Reports: no symptoms


Neurologic/Psychiatric:  Reports: no symptoms


Subjective


71YO M with H/O seizure D/O admitted with frequent falls.  Cover for Int Med-Dr Padron.  Fell yesterday 4/5/20





Objective





Last Vital Signs








  Date Time  Temp Pulse Resp B/P (MAP) Pulse Ox O2 Delivery O2 Flow Rate FiO2


 


4/6/20 09:00      Room Air  


 


4/6/20 08:00 98.1 93 17 146/78 (100) 98   


 


4/3/20 09:00       3.0 











Laboratory Tests








Test


  4/6/20


08:10


 


White Blood Count


  5.9 K/UL


(4.8-10.8)


 


Red Blood Count


  4.30 M/UL


(4.70-6.10)  L


 


Hemoglobin


  14.2 G/DL


(14.2-18.0)


 


Hematocrit


  39.6 %


(42.0-52.0)  L


 


Mean Corpuscular Volume 92 FL (80-99)  


 


Mean Corpuscular Hemoglobin


  32.9 PG


(27.0-31.0)  H


 


Mean Corpuscular Hemoglobin


Concent 35.8 G/DL


(32.0-36.0)


 


Red Cell Distribution Width


  11.8 %


(11.6-14.8)


 


Platelet Count


  267 K/UL


(150-450)


 


Mean Platelet Volume


  6.3 FL


(6.5-10.1)  L


 


Neutrophils (%) (Auto)


  69.2 %


(45.0-75.0)


 


Lymphocytes (%) (Auto)


  15.3 %


(20.0-45.0)  L


 


Monocytes (%) (Auto)


  11.6 %


(1.0-10.0)  H


 


Eosinophils (%) (Auto)


  1.3 %


(0.0-3.0)


 


Basophils (%) (Auto)


  2.7 %


(0.0-2.0)  H


 


Erythrocyte Sedimentation Rate


  79 MM/HR


(0-20)  H


 


Sodium Level


  138 MMOL/L


(136-145)


 


Potassium Level


  3.0 MMOL/L


(3.5-5.1)  L


 


Chloride Level


  99 MMOL/L


()


 


Carbon Dioxide Level


  26 MMOL/L


(21-32)


 


Anion Gap


  13 mmol/L


(5-15)


 


Blood Urea Nitrogen


  8 mg/dL (7-18)


 


 


Creatinine


  0.6 MG/DL


(0.55-1.30)


 


Estimat Glomerular Filtration


Rate > 60 mL/min


(>60)


 


Glucose Level


  108 MG/DL


()  H


 


Calcium Level


  9.6 MG/DL


(8.5-10.1)


 


Phosphorus Level


  2.3 MG/DL


(2.5-4.9)  L


 


Magnesium Level


  1.8 MG/DL


(1.8-2.4)


 


Total Bilirubin


  0.7 MG/DL


(0.2-1.0)


 


Aspartate Amino Transf


(AST/SGOT) 115 U/L


(15-37)  H


 


Alanine Aminotransferase


(ALT/SGPT) 123 U/L


(12-78)  H


 


Alkaline Phosphatase


  317 U/L


()  H


 


C-Reactive Protein,


Quantitative 28.3 mg/dL


(0.00-0.90)  H


 


Total Protein


  7.1 G/DL


(6.4-8.2)


 


Albumin


  2.7 G/DL


(3.4-5.0)  L


 


Globulin 4.4 g/dL  


 


Albumin/Globulin Ratio


  0.6 (1.0-2.7)


L

















Intake and Output  


 


 4/5/20 4/6/20





 19:00 07:00


 


Intake Total 1185 ml 


 


Output Total 600 ml 


 


Balance 585 ml 


 


  


 


Intake Oral 360 ml 


 


IV Total 825 ml 


 


Output Urine Total 600 ml 


 


# Bowel Movements 1 








Objective


PHYSICAL EXAMINATION:


GENERAL:  The patient is a well-developed and well-nourished 


male, in no apparent distress.


HEENT:  Eyes, pupils are equal and responsive to light and accommodation.


Extraocular movements are intact.


NECK:  Supple without lymphadenopathy.


CHEST:  Lungs are clear to auscultation bilaterally without wheeze or


rales.


CARDIOVASCULAR:  Regular rate.  S1, S2 are normal without murmurs, rubs, or


gallops.


ABDOMEN:  Soft, nontender, and nondistended.  Positive bowel sounds.  No


evidence of hepatosplenomegaly.  Currently, no rebound or guarding


noted.


EXTREMITIES:  Negative for clubbing, cyanosis, or edema.


RECTAL/GENITAL:  Refused.


NEUROLOGIC:  Cranial nerves II through XII are grossly intact without focal


deficits.  Motor strength is 5/5 bilaterally.  Deep tendon reflexes are 2+


plantar.





Assessment/Plan


Assessment/Plan


ASSESSMENT:  This is a 70-year-old male with:





1. Frequent falls.


2. Seizure disorder.


3. Benign prostatic hypertrophy.


4. Chronic obstructive pulmonary disease.


5.  Hypokalemia





TREATMENT:


1. Fall injury.  This may be secondary to orthostatic hypotension versus


acute cerebrovascular accident.  Carotid duplex Dopplers of the brain are


pending.


2. Seizure disorder.  Continue Dilantin as above.


3. History of deep venous thrombosis/pulmonary embolism.  Continue


Coumadin as above.


4. Cerebrovascular disease.  The patient denies history of stroke.


Continue aspirin as above.


5. Benign prostatic hypertrophy.  Continue Flomax as above.


6. Chronic obstructive pulmonary disease.  A Pulmonary consultation has


been obtained with Dr. Surekha Dewey.


7.  Oral potassium replacement


8.  Discharge planning:  [facility]











Dominic Temple MD Apr 6, 2020 13:02

## 2020-04-06 NOTE — DIAGNOSTIC IMAGING REPORT
Indications: Seizures, head injury, status post fall

 

Technique: Spiral acquisitions obtained through the brain. Angled axial and coronal 5

x 5 mm slices were reconstructed. Total dose length product 1098 mGycm.  CTDI vol(s)

53 mGy. Dose reduction achieved using automated exposure control

 

Comparison: 3/30/2020

 

Findings: There is some image degradation due to motion artifact. There is

age-related enlargement of the ventricles and extra axial CSF spaces. There is

periventricular deep white matter low-attenuation, consistent with chronic

microvascular ischemic change. There is encephalomalacia of the right temporal tip.

No acute intracranial hemorrhage or edema. No mass effect nor midline shift. The

calvarium is intact. The mastoids are clear. The visualized orbits and sinuses are

unremarkable.

 

Impression: Chronic and age-related changes. 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 Negative for

acute intracranial bleed or mass effect.

 

This agrees with the preliminary interpretation provided overnight by [facility]

teleradiology service.

 

 

 

The CT scanner at [facility] is accredited by the [employer] College of

Radiology and the scans are performed using protocols designed to limit radiation

exposure to as low as reasonably achievable to attain images of sufficient resolution

adequate for diagnostic evaluation.

## 2020-04-06 NOTE — NUR
NURSE NOTES:

received patient in bed, asleep, on 2L O2/min, no sign of pain or respiratory distress. 
Receives IVF through right hand access, no sign of leakage or infiltration noted. Patient 
has external urinary catheter. Bed locked at the lowest position possible, call light within 
easy reach, side rails up x3, padded for seizure prevention. Patient is Fall risk. On bed 
alarm. Patient wears yellow gown and sock, sign at the door. Will continue to monitor 
patient and follow up with the plan of care.

## 2020-04-06 NOTE — PULMONOLOGY PROGRESS NOTE
Assessment/Plan


Problems:  


(1) Falls frequently


(2) Cerebellar atrophy


(3) Failure to thrive in adult


(4) Seizure disorder, complex partial


(5) ACS (acute coronary syndrome)


Assessment/Plan


no new comlaind





pt/ot note reviewed


neuro evaluation appreciated and reviewed


MRI of C-spine


   Impression: No acute bony trauma


   Mild multilevel degenerative changes, as detailed above.





calorie count


symptomatic treatment





MRI of Cspine reviewed


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 

since prior study of 8/30/2017





pt will benefit from skilled PT


awaiting for the [facility] to fax the case management the contracted facilities





Subjective


ROS Limited/Unobtainable:  No


Constitutional:  Reports: no symptoms


HEENT:  Repors: no symptoms


Allergies:  


Coded Allergies:  


     SHELLFISH DERIVED (Unverified  Allergy, Intermediate, ITCHING/HIVES, 7/25/ 14)


     IODINE (Unverified  Allergy, Unknown, 7/25/14)


Uncoded Allergies:  


     SPINACH (Allergy, Intermediate, SEVERE ITCHING/HIVES, 7/25/14)





Objective





Last 24 Hour Vital Signs








  Date Time  Temp Pulse Resp B/P (MAP) Pulse Ox O2 Delivery O2 Flow Rate FiO2


 


4/6/20 08:00 98.1 93 17 146/78 (100) 98   


 


4/6/20 04:00 98.0 90 19 140/74 (96) 95   


 


4/6/20 00:00 97.9 90 20 116/70 (85) 98   


 


4/5/20 21:09      Room Air  


 


4/5/20 20:00 98.2 93 20 150/87 (108) 94   


 


4/5/20 18:56 98.2 79 18 146/78 (100) 96   


 


4/5/20 18:00 98.4 82 16 148/82 (104) 97   


 


4/5/20 17:00 98.4 84 18 148/84 (105) 97   


 


4/5/20 16:00 98.2 78 16 138/78 (98) 96   


 


4/5/20 13:35 98.6 84 18 153/84 (107) 96   


 


4/5/20 13:05 98.4 76 18 144/84 (104) 93   


 


4/5/20 12:35 98.3 78 16 140/84 (102) 93   

















Intake and Output  


 


 4/5/20 4/6/20





 19:00 07:00


 


Intake Total 1185 ml 


 


Output Total 600 ml 


 


Balance 585 ml 


 


  


 


Intake Oral 360 ml 


 


IV Total 825 ml 


 


Output Urine Total 600 ml 


 


# Bowel Movements 1 








Objective


General Appearance:  WD/WN


HEENT:  normocephalic


Respiratory/Chest:  chest wall non-tender, lungs clear


Cardiovascular:  normal peripheral pulses, normal rate, 


Abdomen:  soft, non tender, non distended


Extremities:  no clubbing


Skin:  no rash, no lesions


Laboratory Tests


4/6/20 08:10: 


White Blood Count 5.9, Red Blood Count 4.30L, Hemoglobin 14.2, Hematocrit 39.6L

, Mean Corpuscular Volume 92, Mean Corpuscular Hemoglobin 32.9H, Mean 

Corpuscular Hemoglobin Concent 35.8, Red Cell Distribution Width 11.8, Platelet 

Count 267, Mean Platelet Volume 6.3L, Neutrophils (%) (Auto) 69.2, Lymphocytes (

%) (Auto) 15.3L, Monocytes (%) (Auto) 11.6H, Eosinophils (%) (Auto) 1.3, 

Basophils (%) (Auto) 2.7H, Erythrocyte Sedimentation Rate 79H, Sodium Level 138

, Potassium Level 3.0L, Chloride Level 99, Carbon Dioxide Level 26, Anion Gap 13

, Blood Urea Nitrogen 8, Creatinine 0.6, Estimat Glomerular Filtration Rate > 60

, Glucose Level 108H, Calcium Level 9.6, Phosphorus Level 2.3L, Magnesium Level 

1.8, Total Bilirubin 0.7, Aspartate Amino Transf (AST/SGOT) 115H, Alanine 

Aminotransferase (ALT/SGPT) 123H, Alkaline Phosphatase 317H, C-Reactive Protein

, Quantitative 28.3H, Total Protein 7.1, Albumin 2.7L, Globulin 4.4, Albumin/

Globulin Ratio 0.6L





Current Medications








 Medications


  (Trade)  Dose


 Ordered  Sig/Eddie


 Route


 PRN Reason  Start Time


 Stop Time Status Last Admin


Dose Admin


 


 Acetaminophen


  (Tylenol)  650 mg  Q6H  PRN


 ORAL


 Mild Pain/Temp > 100.5  3/31/20 02:15


 4/30/20 02:14  4/4/20 08:29


 


 


 Amitriptyline HCl


  (Elavil)  25 mg  BEDTIME


 ORAL


   3/31/20 21:00


 4/30/20 20:59  4/5/20 20:42


 


 


 Clonidine HCl


  (Catapres Tab)  0.1 mg  Q4H  PRN


 ORAL


 sbp greater than 160  3/31/20 08:30


 6/29/20 08:29  3/31/20 09:02


 


 


 Dextrose/Sodium


 Chloride  1,000 ml @ 


 75 mls/hr  [id number]


 IV


   3/31/20 02:15


 4/30/20 02:14  4/6/20 05:37


 


 


 Heparin Sodium


  (Porcine)


  (Heparin 5000


 units/ml)  5,000 units  EVERY 8  HOURS


 SUBQ


   3/31/20 06:00


 5/15/20 05:59  4/6/20 06:03


 


 


 Latanoprost


  (Xalatan)  1 drop  BEDTIME


 BOTH EYES


   3/31/20 21:00


 4/30/20 20:59  4/5/20 20:45


 


 


 Lorazepam


  (Ativan 2mg/ml


 1ml)  0.5 mg  Q4H  PRN


 IV


 For Anxiety  4/1/20 13:45


 4/8/20 13:44  4/6/20 00:14


 


 


 Pantoprazole


  (Protonix)  40 mg  DAILY


 ORAL


   3/31/20 09:00


 4/30/20 08:59  4/6/20 09:53


 


 


 Phenytoin


  (Dilantin)  100 mg  BEDTIME


 ORAL


   3/31/20 21:00


 4/30/20 20:59  4/5/20 20:41


 


 


 Phenytoin


  (Dilantin)  200 mg  DAILY


 ORAL


   3/31/20 09:00


 4/30/20 08:59  4/6/20 09:53


 


 


 Primidone


  (Mysoline)  250 mg  DAILY


 ORAL


   3/31/20 09:00


 4/30/20 08:59  4/6/20 09:52


 


 


 Primidone


  (Mysoline)  500 mg  BEDTIME


 ORAL


   3/31/20 21:00


 4/30/20 20:59  4/5/20 20:41


 


 


 Sumatriptan


 Succinate


  (Imitrex)  100 mg  DAILY  PRN


 ORAL


 For Pain  3/31/20 02:30


 4/30/20 02:29   


 


 


 Tamsulosin HCl


  (Flomax)  0.8 mg  BEDTIME


 ORAL


   3/31/20 21:00


 4/30/20 20:59  4/5/20 20:41


 

















Surekha Dewey MD Apr 6, 2020 11:27

## 2020-04-06 NOTE — PROGRESS NOTE
DATE:  04/06/2020

SUBJECTIVE:  The patient seems to be more confused today, although he has

been "obeying commands" and oriented according to the nursing notes.



PHYSICAL EXAMINATION:

VITAL SIGNS:  Temperature is 98.1 degrees, blood pressure 146/78,

respiratory rate 17, SpO2 18.

MENTAL STATUS:  The patient thinks it is January.  He is lethargic.  He has

slightly slurred speech.

MUSCLE EXAMINATION:  He moves all 4 extremities.  There is no asterixis.



IMPRESSION:  This patient appears to have metabolic encephalopathy noted

that he is on 500 mg of Mysoline at night.  This should be discontinued.

His primidone level is still not back yet.  Also, his liver function seems

to be going up.  I would obtain a serum ammonia only, he does not have any

asterixis, and a TSH and an EEG and discontinue the Mysoline at night.



PLAN:

1. Stop Mysoline 500 mg at bedtime.

2. EEG, TSH, and serum ammonia.









  ______________________________________________

  Storm Fink MD





DR:  Doyle

D:  04/06/2020 14:40

T:  04/06/2020 21:34

JOB#:  [id number]

CC:

## 2020-04-06 NOTE — NUR
NURSE NOTES:

Received patient in bed, awake, periods of confusion and disorientation noted, IV site is 
clean dry and intact, no acute distress noted at this time. Call light is within reach, fall 
precautions are implemented, frequent rounds are done to ensure safety and comfort, bed 
alarm is on.

## 2020-04-06 NOTE — NUR
NURSE NOTES:

MISSY Contreras [phone] called, told nurse the previous day patient texted him 20 
times with strange and hallucinatory messages, some saying he felt like being a giant 
spider, some saying he didn't know where he was. Nurse informed neurologist dr Fink, who 
ordered stat CT head.

## 2020-04-06 NOTE — NUR
NURSE NOTES:

per POA request nurse endorsed to NOC RN to have the day nurse call MTA Jorgito Contreras [phone] at the time of discharge.

## 2020-04-07 NOTE — NUR
*-* INSURANCE *-*



ALL AVAILABLE CLINICALS HAVE BEEN FAXED TO: 



[facility]

P: 

F: [phone] (FAX ALL CLINICALS)

## 2020-04-07 NOTE — ELECTROENCEPHALOGRAM
DATE OF PROCEDURE:  04/06/2020



REQUESTING PHYSICIAN:  Storm Fink M.D.



READING PHYSICIAN:  Gm Dale M.D.



PROCEDURE PERFORMED:  EEG.



HISTORY:  This EEG was performed on 70-year-old gentleman 

with a history of multiple medical problems including a seizure 

disorder associated with right temporal encephalomalacia.  

The purpose of this EEG was to evaluate the patient for 

ongoing ictal or interictal phenomena.



TECHNICAL NOTE:  This EEG was performed on [employer] with electrodes placed on the scalp 

according to the International 10-20 system.  Scalp-to-scalp 

and scalp-to-ear montages were used.  The EEG was technically 

satisfactory and was performed in the awake and drowsy

states.



OBSERVATIONS:  

In the best-awake state, the background activity consisted

of 6-7 Hz posterior rhythmic theta activity.  



Drowsiness was characterized by slowing of the background

in the 4-5 Hz theta range with some intermixed delta frequencies.  

During drowsiness bilateral but right greater than left frontotemporal 

polymorphic delta activity was noted.  



No definite epileptiform discharges were seen.



IMPRESSION:

This is an abnormal EEG characterized by:

1. Slowing of the background in the 6-7 Hz theta range in the

best-awake state.

2. The presence of right greater than left frontotemporal 

polymorphic delta activity seen during drowsiness.



COMMENT:  

This study is consistent with:

1. An encephalopathy of a moderate degree.

2. Right greater than left frontotemporal dysfunction.



Clinical correlation is recommended.





___________________________

Gm Dale M.D., M.S.P.H.

Clinical Neurophysiologist



DR:  CARLOS/james

D:  04/07/2020 13:58

T:  04/07/2020 16:34

JOB#:  [id number]
[facility]

## 2020-04-07 NOTE — NUR
nurse notes

With order discharge to [facility] , patient agreed with the plan of care, discharge 
instruction instructed to patient 

report given to Katerina morton nurse accordingly, patient will be discharged via 
ambulance



kurtis elizabeth

## 2020-04-07 NOTE — NUR
nurse notes

discharged  to [facility] in stable condition, with all belongings taken accompanied by 
ambulance , dreport given to ambulance personnel, discharged via ambulance



kurtis elizabeth

## 2020-04-07 NOTE — NUR
NURSE NOTES:

Received patient in bed, awake,alert x2 -3, with periods of confusion and disorientation 
noted, IV site is leaking, no acute distress noted at this time. Call light is within reach, 
fall and aspiration precautions are implemented, will continue to ensure safety and comfort, 




kurtis elizabeth

## 2020-04-07 NOTE — NUR
RD ASSESSMENT & RECOMMENDATIONS

SEE CARE ACTIVITY FOR COMPLETE ASSESSMENT



DAILY ESTIMATED NEEDS:

Needs based on Pulmonary 79kg  

25-30  kcals/kg 

[id number]  total kcals

1-1.5  g protein/kg

  g total protein 

25-30  mL/kg

1975-2370  total fluid mLs



NUTRITION DIAGNOSIS:

Altered nutrition related lab values r/t clinical status as evidenced by

low K (3.0- wnl), low phos (2.3), elev LFT's and Creat kinase.



CURRENT DIET: Regular     



PO DIET RECOMMENDATIONS:

With consistent po intake >75%, rec Low Na diet  



ADDITIONAL RECOMMENDATIONS:

1) Monitor po intake, need for soft easy chew  

2) F/up w/ Kcal count-> not complete.  

3) Check lytes daily, replete as needed (low K, phos) 

4) Obtain a calibrated bed scale wt  

5) Add snack in b/w all meals  

6) Consider SLP eval for appropriate texture, better food acceptance

## 2020-04-07 NOTE — NUR
DISCHARGE PLANNING:

SPOKE TO PATIENT AT BEDSIDE 

PATIENT AGREEABLE TO TRANSFER

PATIENT ACCEPTED TO:



[facility]

T: [phone]



ROOM# [id number]

SKILLED 



[facility] TIME @2-2:30PM

UNABLE TO LEAVE VM FOR COREY FERNANDES (T:[phone])


-------------------------------------------------------------------------------

Addendum: 04/07/20 at 1528 by JAJA CROWLEY LVN

-------------------------------------------------------------------------------

AUTHORIZATION FOR AMBULANCE TRANSFER 

#[id number]

## 2020-04-07 NOTE — PULMONOLOGY PROGRESS NOTE
Assessment/Plan


Problems:  


(1) Falls frequently


(2) Cerebellar atrophy


(3) Failure to thrive in adult


(4) Seizure disorder, complex partial


(5) ACS (acute coronary syndrome)


Assessment/Plan


no new comlaind





symptomatic treatment





pt/ot note reviewed


neuro evaluation appreciated and reviewed


MRI of C-spine


   Impression: No acute bony trauma


   Mild multilevel degenerative changes, as detailed above.








MRI of Cspine reviewed


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 

since prior study of 8/30/2017





pt will benefit from skilled PT


awaiting for the [facility] to fax the case management the contracted facilities





Subjective


ROS Limited/Unobtainable:  Yes


HEENT:  Repors: no symptoms


Allergies:  


Coded Allergies:  


     SHELLFISH DERIVED (Unverified  Allergy, Intermediate, ITCHING/HIVES, 7/25/ 14)


     IODINE (Unverified  Allergy, Unknown, 7/25/14)


Uncoded Allergies:  


     SPINACH (Allergy, Intermediate, SEVERE ITCHING/HIVES, 7/25/14)





Objective





Last 24 Hour Vital Signs








  Date Time  Temp Pulse Resp B/P (MAP) Pulse Ox O2 Delivery O2 Flow Rate FiO2


 


4/7/20 12:00 98.0 94 20 149/82 (104) 99   


 


4/7/20 09:10      Room Air  


 


4/7/20 08:00 97.7 85 18 133/74 (93) 99   


 


4/7/20 04:15 99.8       


 


4/7/20 04:09 100.4 96 20 145/82 (103) 96   


 


4/7/20 00:00 97.4 91 22 140/79 (99) 97   


 


4/6/20 21:46      Room Air  


 


4/6/20 20:00 98.4 88 24 139/78 (98) 98   


 


4/6/20 15:49 98.1 86 18 141/81 (101) 96   

















Intake and Output  


 


 4/6/20 4/7/20





 19:00 07:00


 


Intake Total 750 ml 


 


Output Total 10 ml 


 


Balance 740 ml 


 


  


 


IV Total 750 ml 


 


Stool Total 10 ml 


 


# Voids  3


 


# Bowel Movements 2 








Objective


General Appearance:  WD/WN


HEENT:  normocephalic


Respiratory/Chest:  chest wall non-tender, lungs clear


Cardiovascular:  normal peripheral pulses, normal rate, 


Abdomen:  soft, non tender, non distended


Extremities:  no clubbing


Skin:  no rash, no lesions


Laboratory Tests


4/6/20 16:15: 


Ammonia 34H, Thyroid Stimulating Hormone (TSH) 0.725


4/7/20 07:45: 


White Blood Count 7.8, Red Blood Count 4.02L, Hemoglobin 13.2L, Hematocrit 36.4L

, Mean Corpuscular Volume 90, Mean Corpuscular Hemoglobin 32.7H, Mean 

Corpuscular Hemoglobin Concent 36.2H, Red Cell Distribution Width 11.7, 

Platelet Count 287, Mean Platelet Volume 5.9L, Neutrophils (%) (Auto) 75.3H, 

Lymphocytes (%) (Auto) 11.0L, Monocytes (%) (Auto) 11.8H, Eosinophils (%) (Auto

) 0.4, Basophils (%) (Auto) 1.4, Sodium Level 136, Potassium Level 3.6, 

Chloride Level 100, Carbon Dioxide Level 30, Anion Gap 7, Blood Urea Nitrogen 10

, Creatinine 0.5L, Estimat Glomerular Filtration Rate > 60, Glucose Level 110H, 

Calcium Level 9.8





Current Medications








 Medications


  (Trade)  Dose


 Ordered  Sig/Eddie


 Route


 PRN Reason  Start Time


 Stop Time Status Last Admin


Dose Admin


 


 Acetaminophen


  (Tylenol)  650 mg  Q6H  PRN


 ORAL


 Mild Pain/Temp > 100.5  3/31/20 02:15


 4/30/20 02:14  4/7/20 13:33


 


 


 Amitriptyline HCl


  (Elavil)  25 mg  BEDTIME


 ORAL


   3/31/20 21:00


 4/30/20 20:59  4/6/20 20:58


 


 


 Clonidine HCl


  (Catapres Tab)  0.1 mg  Q4H  PRN


 ORAL


 sbp greater than 160  3/31/20 08:30


 6/29/20 08:29  3/31/20 09:02


 


 


 Dextrose/Sodium


 Chloride  1,000 ml @ 


 75 mls/hr  [id number]


 IV


   3/31/20 02:15


 4/30/20 02:14  4/7/20 06:15


 


 


 Heparin Sodium


  (Porcine)


  (Heparin 5000


 units/ml)  5,000 units  EVERY 8  HOURS


 SUBQ


   3/31/20 06:00


 5/15/20 05:59  4/7/20 13:31


 


 


 Latanoprost


  (Xalatan)  1 drop  BEDTIME


 BOTH EYES


   3/31/20 21:00


 4/30/20 20:59  4/5/20 20:45


 


 


 Lorazepam


  (Ativan 2mg/ml


 1ml)  0.5 mg  Q4H  PRN


 IV


 For Anxiety  4/1/20 13:45


 4/8/20 13:44  4/6/20 00:14


 


 


 Pantoprazole


  (Protonix)  40 mg  DAILY


 ORAL


   3/31/20 09:00


 4/30/20 08:59  4/7/20 08:29


 


 


 Phenytoin


  (Dilantin)  100 mg  BEDTIME


 ORAL


   3/31/20 21:00


 4/30/20 20:59  4/6/20 20:58


 


 


 Phenytoin


  (Dilantin)  200 mg  DAILY


 ORAL


   3/31/20 09:00


 4/30/20 08:59  4/7/20 08:28


 


 


 Potassium Chloride


  (K-Dur)  40 meq  DAILY


 ORAL


   4/6/20 13:30


 4/10/20 13:29  4/7/20 08:27


 


 


 Primidone


  (Mysoline)  250 mg  DAILY


 ORAL


   3/31/20 09:00


 4/30/20 08:59  4/7/20 08:28


 


 


 Sumatriptan


 Succinate


  (Imitrex)  100 mg  DAILY  PRN


 ORAL


 For Pain  3/31/20 02:30


 4/30/20 02:29   


 


 


 Tamsulosin HCl


  (Flomax)  0.8 mg  BEDTIME


 ORAL


   3/31/20 21:00


 4/30/20 20:59  4/6/20 20:58


 

















Surekha Dewey MD Apr 7, 2020 13:55

## 2020-04-08 NOTE — NUR
*-* INSURANCE *-*



DISCHARGE SUMMARY HAS BEEN FAXED TO:



[facility]

P: 

F: [phone] (FAX ALL CLINICALS)

## 2020-04-08 NOTE — DISCHARGE SUMMARY
Discharge Summary


Discharge Summary


_


DATE OF ADMISSION: 3/30/2020





DATE OF DISCHARGE: 4/7/2020








DISCHARGED BY: Dr. Padron





REASON FOR ADMISSION: 


70 years old male with past medical history of COPD, seizure disorder, on 

Dilantin, presented with  chief complaint of frequent falls.  


Falls reported to be ongoing for the last 6 months and   became more frequent 

in the last few days.


Patient reported that he fell 3 days ago and hit his head.  


Patient fell twice the day prior to presentation and was unable to get up .


Patient denied any chest pain.


He had a headache for few hours couple days ago ,  but it resolved.  


No focal deficit.  


No nausea no vomiting.  


No chest pain or shortness of breath.  


Upon evaluation vital signs were stable. 


Laboratory work-up revealed no leukocytosis,  stable hemoglobin .hematocrit and 

platelet count.  


Stable electrolytes.  


AST 64 , ALT 60 .


urinalysis revealed no evidence of urinary tract infection.  


CT of the head revealed chronic and age-related changes , but was negative for 

acute intracranial bleeding or mass-effect.  


Right temporal tip encephalomalacia presumably old infarct noted,  which was a 

new finding since prior study in 2017.


Left hip x-ray revealed postsurgical changes , but no acute bony trauma.  


Patient was unable to ambulate.  Patient lives by himself.  


Patient  was admitted for further evaluation, rehabilitation , physical therapy 

and   placement.


 


CONSULTANTS:


neurologist Dr. Fink


pulmonary Dr. Dewey


 


 


HOSPITAL COURSE: 


Patient admitted.  


Neurologist followed.  


Patient undergone repeated CT of the head which showed  old right temporal 

infarct but was negative for acute intracranial bleeding or mass-effect.  


MRI of the cervical spine demonstrated no acute bony trauma.  Mild  multilevel 

degenerative changes.  


Lumbar spine x-ray revealed no obvious acute fracture.  Scoliosis and 

generalized osteopenia noted.  


Ammonia level stable.  34.  


Patient undergone EEG which revealed findings,  consistent with encephalopathy 

of moderate degree, right greater than left frontotemporal dysfunction.  


Per neurologist patient had  metabolic encephalopathy . 


Patient was on 500 mg of Mysoline at nighttime , which  was stopped.  


Liver enzymes are getting up.  


Serum ammonia stable, 34. Patient had no asterixis.  


TSH was within normal limits


Stable B12 .


Seizure precautions maintained.  Dilantin continued. 


No evidence of seizure activity while in the hospital.


Full precaution maintained.


Patient was working with physical therapist.  


DVT=and GI prophylaxis provided.  


Flomax continued.  


Potassium was replaced.  


Placement was arranged to skilled nursing facility [facility].  


Patient was stable for transfer








FINAL DIAGNOSES: 


Frequent falls


Failure to thrive 


Seizure disorder complex partial


Metabolic encephalopathy


COPD


BPH


Cerebellar atrophy


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


Hypokalemia





DISCHARGE MEDICATIONS:


See Medication Reconciliation list.





DISCHARGE INSTRUCTIONS:


Patient was discharged to the skilled nursing facility. 


Follow up with medical doctor at the facility.





I have been assigned to dictate discharge summary for this account.


I was not involved in the patient's management.











Clary Barbosa NP Apr 8, 2020 11:15

## 2020-08-07 ENCOUNTER — HOSPITAL ENCOUNTER (EMERGENCY)
Dept: HOSPITAL 72 - EMR | Age: 70
Discharge: ANOTHER HOSPITAL | End: 2020-08-07
Payer: MEDICARE

## 2020-08-07 VITALS — DIASTOLIC BLOOD PRESSURE: 69 MMHG | SYSTOLIC BLOOD PRESSURE: 129 MMHG

## 2020-08-07 VITALS — DIASTOLIC BLOOD PRESSURE: 65 MMHG | SYSTOLIC BLOOD PRESSURE: 126 MMHG

## 2020-08-07 VITALS — BODY MASS INDEX: 22.9 KG/M2 | HEIGHT: 70 IN | WEIGHT: 160 LBS

## 2020-08-07 VITALS — DIASTOLIC BLOOD PRESSURE: 65 MMHG | SYSTOLIC BLOOD PRESSURE: 130 MMHG

## 2020-08-07 VITALS — SYSTOLIC BLOOD PRESSURE: 145 MMHG | DIASTOLIC BLOOD PRESSURE: 68 MMHG

## 2020-08-07 VITALS — DIASTOLIC BLOOD PRESSURE: 65 MMHG | SYSTOLIC BLOOD PRESSURE: 138 MMHG

## 2020-08-07 DIAGNOSIS — I62.9: ICD-10-CM

## 2020-08-07 DIAGNOSIS — J44.9: ICD-10-CM

## 2020-08-07 DIAGNOSIS — Z91.81: ICD-10-CM

## 2020-08-07 DIAGNOSIS — G40.909: ICD-10-CM

## 2020-08-07 DIAGNOSIS — R53.1: Primary | ICD-10-CM

## 2020-08-07 DIAGNOSIS — Z85.828: ICD-10-CM

## 2020-08-07 DIAGNOSIS — Z23: ICD-10-CM

## 2020-08-07 DIAGNOSIS — S00.83XA: ICD-10-CM

## 2020-08-07 DIAGNOSIS — R84.2: ICD-10-CM

## 2020-08-07 DIAGNOSIS — R55: ICD-10-CM

## 2020-08-07 DIAGNOSIS — Z79.899: ICD-10-CM

## 2020-08-07 DIAGNOSIS — Z79.01: ICD-10-CM

## 2020-08-07 DIAGNOSIS — Z86.711: ICD-10-CM

## 2020-08-07 LAB
ADD MANUAL DIFF: NO
ALBUMIN SERPL-MCNC: 3.6 G/DL (ref 3.4–5)
ALBUMIN/GLOB SERPL: 0.9 {RATIO} (ref 1–2.7)
ALP SERPL-CCNC: 199 U/L (ref 46–116)
ALT SERPL-CCNC: 65 U/L (ref 12–78)
ANION GAP SERPL CALC-SCNC: 10 MMOL/L (ref 5–15)
APPEARANCE UR: (no result)
APTT PPP: YELLOW S
AST SERPL-CCNC: 157 U/L (ref 15–37)
BASOPHILS NFR BLD AUTO: 0.9 % (ref 0–2)
BILIRUB SERPL-MCNC: 0.5 MG/DL (ref 0.2–1)
BUN SERPL-MCNC: 19 MG/DL (ref 7–18)
CALCIUM SERPL-MCNC: 9.9 MG/DL (ref 8.5–10.1)
CHLORIDE SERPL-SCNC: 102 MMOL/L (ref 98–107)
CK SERPL-CCNC: 5352 U/L (ref 26–308)
CO2 SERPL-SCNC: 28 MMOL/L (ref 21–32)
CREAT SERPL-MCNC: 1 MG/DL (ref 0.55–1.3)
EOSINOPHIL NFR BLD AUTO: 0.1 % (ref 0–3)
ERYTHROCYTE [DISTWIDTH] IN BLOOD BY AUTOMATED COUNT: 12.8 % (ref 11.6–14.8)
GLOBULIN SER-MCNC: 4.2 G/DL
GLUCOSE UR STRIP-MCNC: NEGATIVE MG/DL
HCT VFR BLD CALC: 43.8 % (ref 42–52)
HGB BLD-MCNC: 14.7 G/DL (ref 14.2–18)
INR PPP: 1.1 (ref 0.9–1.1)
KETONES UR QL STRIP: (no result)
LEUKOCYTE ESTERASE UR QL STRIP: (no result)
LYMPHOCYTES NFR BLD AUTO: 10.2 % (ref 20–45)
MCV RBC AUTO: 96 FL (ref 80–99)
MONOCYTES NFR BLD AUTO: 7.8 % (ref 1–10)
NEUTROPHILS NFR BLD AUTO: 81 % (ref 45–75)
NITRITE UR QL STRIP: POSITIVE
PH UR STRIP: 5 [PH] (ref 4.5–8)
PLATELET # BLD: 226 K/UL (ref 150–450)
POTASSIUM SERPL-SCNC: 3.6 MMOL/L (ref 3.5–5.1)
PROT UR QL STRIP: (no result)
RBC # BLD AUTO: 4.58 M/UL (ref 4.7–6.1)
SODIUM SERPL-SCNC: 140 MMOL/L (ref 136–145)
SP GR UR STRIP: 1.02 (ref 1–1.03)
UROBILINOGEN UR-MCNC: NORMAL MG/DL (ref 0–1)
WBC # BLD AUTO: 8 K/UL (ref 4.8–10.8)

## 2020-08-07 PROCEDURE — 83880 ASSAY OF NATRIURETIC PEPTIDE: CPT

## 2020-08-07 PROCEDURE — 72125 CT NECK SPINE W/O DYE: CPT

## 2020-08-07 PROCEDURE — 83690 ASSAY OF LIPASE: CPT

## 2020-08-07 PROCEDURE — 93005 ELECTROCARDIOGRAM TRACING: CPT

## 2020-08-07 PROCEDURE — 86900 BLOOD TYPING SEROLOGIC ABO: CPT

## 2020-08-07 PROCEDURE — 85610 PROTHROMBIN TIME: CPT

## 2020-08-07 PROCEDURE — 87181 SC STD AGAR DILUTION PER AGT: CPT

## 2020-08-07 PROCEDURE — 80185 ASSAY OF PHENYTOIN TOTAL: CPT

## 2020-08-07 PROCEDURE — 87086 URINE CULTURE/COLONY COUNT: CPT

## 2020-08-07 PROCEDURE — 82550 ASSAY OF CK (CPK): CPT

## 2020-08-07 PROCEDURE — 96361 HYDRATE IV INFUSION ADD-ON: CPT

## 2020-08-07 PROCEDURE — 84484 ASSAY OF TROPONIN QUANT: CPT

## 2020-08-07 PROCEDURE — 99284 EMERGENCY DEPT VISIT MOD MDM: CPT

## 2020-08-07 PROCEDURE — 90715 TDAP VACCINE 7 YRS/> IM: CPT

## 2020-08-07 PROCEDURE — 86850 RBC ANTIBODY SCREEN: CPT

## 2020-08-07 PROCEDURE — 80053 COMPREHEN METABOLIC PANEL: CPT

## 2020-08-07 PROCEDURE — 81003 URINALYSIS AUTO W/O SCOPE: CPT

## 2020-08-07 PROCEDURE — 96365 THER/PROPH/DIAG IV INF INIT: CPT

## 2020-08-07 PROCEDURE — 85025 COMPLETE CBC W/AUTO DIFF WBC: CPT

## 2020-08-07 PROCEDURE — 71045 X-RAY EXAM CHEST 1 VIEW: CPT

## 2020-08-07 PROCEDURE — 96367 TX/PROPH/DG ADDL SEQ IV INF: CPT

## 2020-08-07 PROCEDURE — 90471 IMMUNIZATION ADMIN: CPT

## 2020-08-07 PROCEDURE — 70450 CT HEAD/BRAIN W/O DYE: CPT

## 2020-08-07 PROCEDURE — 86901 BLOOD TYPING SEROLOGIC RH(D): CPT

## 2020-08-07 PROCEDURE — 36415 COLL VENOUS BLD VENIPUNCTURE: CPT

## 2020-08-07 NOTE — NUR
ED Nurse Note:pt. was BIBA from home with general weakness and s/p fall, pt. 
has bruising and abrasion on right eye brow and perianal redness from 
incontinance, pt. is A/ox2, non ambulatory, no c/o pain at this time, blood was 
sent to labs, pt. placed on cardiac monitor

## 2020-08-07 NOTE — DIAGNOSTIC IMAGING REPORT
Indication: Neck pain

 

Technique: CT cervical spine was performed utilizing automated exposure control

without intravenous contrast material. Axial and coronal images were generated.

 

CT dose including concurrently performed CT head: Total .7 mGycm; CTDI vol

19.4 mGy

 

Comparison: None

 

Findings: 

 

There is anatomic alignment. Cervical lordosis is maintained. No spondylolisthesis.

Vertebral bodies are intact without compression deformity. There is no fracture, bony

lesions or erosions. Atlantodental intervals are maintained. Dens is intact. There

are multilevel discogenic degenerative changes characterized by disc space narrowing,

endplate osteophyte formation, and multilevel uncovertebral hypertrophy.   There is

no paraspinal soft tissue abnormality

 

Emphysematous changes are noted in the lung apices. There is debris/mucus layering in

the dependent portion of the hypopharynx.

 

Impression:

 

No acute fracture or malalignment.

 

 

The CT scanner at [facility] is accredited by the American College of

Radiology and the scans are performed using protocols designed to limit radiation

exposure to as low as reasonably achievable to attain images of sufficient resolution

adequate for diagnostic evaluation.

## 2020-08-07 NOTE — DIAGNOSTIC IMAGING REPORT
CT HEAD WITHOUT CONTRAST

 

INDICATION: Reason For Exam: SYNCOPE

 

Technique: Continuous helical CT scanning of the head was performed without

intravenous contrast material. Axial and coronal 5 mm sections were generated.

Radiation dose was minimized using automated exposure control

 

DOSE including concurrently performed CT cervical spine:

Total Dose Length Product - DLP 1098.9 mGycm.

Volume CT Dose Index - CTDIvol(s) 53.4 mGy.

 

COMPARISON:  CT head dated 3/30/2020 and 4/6/2020

 

FINDINGS: 

 

 

There are 2 small foci of acute intraparenchymal hemorrhage with surrounding edema in

the right frontal lobe subjacent to the cortex. There is an additional larger focus

of acute intraparenchymal hemorrhage in the right medial temporal lobe. No

extra-axial collections identified.

 

No evidence of mass effect including midline shift or cisternal effacement. 

 

Stable mild dilatation of the ventricular system commensurate with degree of cerebral

atrophy.

 

There are scattered patchy subcortical and periventricular hypodensities

 

Visualized mastoid air cells and paranasal sinuses are unremarkable. No focal lesions

of the bony calvarium or soft tissues of the scalp are seen.

 

IMPRESSION: 

 

Acute intraparenchymal hemorrhage in the right frontal and medial temporal lobes.

 

Findings discussed in person with Dr. Chaudhry at [facility] on 8/7/2020. 

 

 

The CT scanner at [facility] is accredited by the American College of

Radiology and the scans are performed using protocols designed to limit radiation

exposure to as low as reasonably achievable to attain images of sufficient resolution

adequate for diagnostic evaluation.

## 2020-08-07 NOTE — NUR
ED Nurse Note:

Patient continues to take off all of his clothes. Patient also DC's IV. Will 
continue to monitor patient closely.

## 2020-08-07 NOTE — NUR
ED Nurse Note:

Patient IV restarted at left hand, 20G, patent. Will continue to monitor for 
transfer.

## 2020-08-07 NOTE — DIAGNOSTIC IMAGING REPORT
Indication: Reason For Exam: WEAK

 

Technique:  Single AP view of the chest.

 

Comparison: Chest radiograph dated 3/31/2020

 

Findings:

 

The cardiomediastinal silhouette is unchanged in appearance. No new airspace

consolidation. Redemonstration of left basilar subsegmental atelectasis/scarring.

Again noted is suggestion of emphysematous changes. No acute osseous abnormality.

Persistent moderate scoliosis.

 

IMPRESSION:

 

No radiographic evidence of acute cardiopulmonary process.

## 2020-08-07 NOTE — NUR
ED Nurse Note:

Patient is awake and alert x1. Will continue to monitor for safety. Vital signs 
stable and documented.

## 2020-08-07 NOTE — EMERGENCY ROOM REPORT
History of Present Illness


General


Chief Complaint:  Generalized Weakness


Source:  Patient





Present Illness


HPI


70-year-old  male with past medical history of seizure disorder on 

Dilantin, previous PE x2 previously on Coumadin (unsure of last use), frequent 

falls brought in by ambulance status post syncopal episode prior to arrival.  

Patient has frequent falls and apparently fell 2 weeks ago injuring the right 

side of his face.


He is unable to recall the events prior to passing out.


He was unable to get up on his own.


Denies chest pain, shortness of breath, nausea, vomiting, diarrhea, fever, 

chills, cough, hemoptysis, melena, or hematochezia.


The patient's symptoms were gradual onset, severity was moderate, duration 

since 1 day.  


History is limited secondary to patient's altered mental status.


Quality: Aching 





Past medical history: Seizure disorder on Dilantin, previous PE x2, frequent 

falls


Past surgical history: Intertrochanteric fracture





Smoking:  Denies


Alcohol use:  Denies


Drug use:  Denies





Review of systems:


CONST: No fevers or chills, No night sweats


PULMONARY: No productive cough,  No shortness of breath 


CARDIAC: No chest pain, No palpitations 


GI: No vomiting, No diarrhea , No melena_or_BRBPR 


: No dysuria, No hematuria, No discharge 


NEURO: No new_focal_weakness_or_numbness, No confusion, No vision changes


14 point Review of Systems is otherwise negative except per HPI





Physical Exam:


GENERAL: Awake_alert_ nontoxic, no acute distress Spo2 98% on RA -normal


EYES: Extraocular muscles are intact. Conjunctivae clear. Lids without swelling


No cycloplegia, hyphema, or proptosis.  No midface instability.  R periorbital 

ecchymosis 


ENT: External nose and ear normal_in_appearance. Oropharynx clear. Head_

atraumatic, Moist_oral_mucosa


NECK:  No JVD. No meningismus. No thyromegaly.  Supple. Trachea midline.  No 

midline cervical, thoracic, or lumbar tenderness palpation.


RESP: Normal respiratory effort. Symmetric rise. No stridor. Clear_to_

auscultation_No_rales_No_wheezes


CARDIAC: Regular rate and regular rhythm on_auscultation   No_significant pedal 

edema.


ABDOMEN: Soft. Nondistended.  Nontender_No_rebound_or_guarding.


MSK:  Normal muscle tone, without rigidity.  Extremities without asymmetric 

deformity or swelling.  


SKIN: Warm and dry.  No visible cyanosis or pallor


NEUROLOGIC: Alert, oriented x2.  Motor_and_sensation_grossly_intact. No truncal 

ataxia. Gait_normal


GCS 14


Psych: Normal mood and affect, normal judgment and insight











- COORDINATION OF CARE


Case was discussed with: Patient  , Patient's Physician





Any labs and imaging that were ordered were interpreted as part of the medical 

decision making:








Medical Decision Making/Plan:


Differential diagnosis for the patients symptoms include , malignant 

arrhythmias, obstructive heart disease (critical aortic stenosis, hypertrophic 

cardiomyopathy), acute anemia, severe dehydration, electrolyte abnormalities, 

seizure disorder, Dilantin toxicity, intracranial hemorrhage among others.  





Patient's exam is neurologically intact but he is noted to have trauma to the R 

face. No midface instability. GCS 14, patient is confused. 


EKG shows no signs of malignant arrhythmia such as Brugada syndrome, delta wave

, epsilon wave, significant heart block, or QTc >500.  


CXR shows hyperinflated lungs.  No acute disease.


CT of the head shows 2 foci of acute intracranial hemorrhage.  Right frontal 

and right medial ICH.  No midline shift.  I was informed of this by Radiologist 

Dr. Charlton at 1630.


Troponin is negativex1. Labs also show subtherapeutic Dilantin level. INR is 

1.1. NO indication for coumadin reversal at this time. 


COVID Swab negative





Blood pressure is stable less than 140 mmHg. No indication for cardene at this 

time


Keppra given for sz prophylaxis. 





1640: Patient will need to be transferred for neurosurgical continuation of 

care for fall on blood thinners.  Transfer initiated by charge nurse Jody


1720: I spoke with NSx Dr. Soliman at [facility] who accepts transfer for 

higher level of care.





The patient has been stabilized to the best of this emergency department's 

capabilities. Given the patient's medical needs, appropriate facilities for 

transfer were discussed and the decision has been made to transfer this patient 

to [facility]. The receiving facility has the capacity and capabilities to 

provide care for the patient. I spoke with Dr Soliman who accepted the patient 

in transfer. The patient has been informed and updated of their current 

clinical status. The patient has given verbal consent for the transfer. The 

risks and benefits were explained and the patient verbalizes their 

understanding. The patient will be transported by ALS. Pt is protecting airway. 

No indication for intubation at this time.


Allergies:  


Coded Allergies:  


     SHELLFISH DERIVED (Unverified  Allergy, Intermediate, ITCHING/HIVES, 7/25/ 14)


     IODINE (Unverified  Allergy, Unknown, 7/25/14)


Uncoded Allergies:  


     SPINACH (Allergy, Intermediate, SEVERE ITCHING/HIVES, 7/25/14)





COVID-19 Screening


Contact w/high risk pt:  No


Recent Travel to affected area:  No


Experienced COVID-19 symptoms?:  No


COVID-19 Testing performed PTA:  No





Nursing Documentation-PMH


Hx COPD:  Yes


Hx Cancer:  Yes - BASAL CELL CARCINOMA SKIN


Hx Gastrointestinal Problems:  Yes


Hx Neurological Problems:  Yes


Hx Seizures:  Yes


Hx Epilepsy:  Yes - Well controlled with medications - last seizure 35 yrs ago


Hx Syncope:  Yes - THIS AM


Hx Headaches:  Yes - MIGRAINES


Hx Numbness:  Yes - TIPS OF THE TOES





Physical Exam





Vital Signs








  Date Time  Temp Pulse Resp B/P (MAP) Pulse Ox O2 Delivery O2 Flow Rate FiO2


 


8/7/20 14:38 98.2 85 19 120/77 (91) 94 Room Air  








Sp02 EP Interpretation:  reviewed, normal





Procedures


Critical Care Time


Critical Care Time


Critical Care Statement


Organ systems at risk include: Cardiac, circulatory, neuro


Critical care performed for 55  minutes. Time is exclusive of separately 

billable procedures. 


Time includes: direct patient care, continuous monitoring and multiple patient 

reassessment, coordination of patient care, review of patient's medical records

, medical consultation, family consultation regarding treatment decisions and 

documentation of patient care.





Medical Decision Making


Diagnostic Impression:  


 Primary Impression:  


 Episode of generalized weakness


 Additional Impressions:  


 Syncope


 Facial bruising


 Intracranial hemorrhage


 Seizure disorder


 Subtherapeutic serum dilantin level


 History of Coumadin therapy


 History of pulmonary embolus (PE)


 Multiple injuries due to trauma





EKG Diagnostic Results


PA Scribe Text


12-lead EKG (interpreted by me) 


Time: 1623


Indication: Rhythm analysis


Tracing visualized and Interpreted by me. Rhythm:  Normal sinus rhythm 


Rate: 75 bpm


QTc: 457


Morphology: No_significant_ST_elevations_or_depressions, No STEMI


Impression: Normal sinus rhythm, Q waves anterior and inferiorly.  No acute 

STEMI








12-lead EKG (interpreted by me) 


Time: 1622


Indication: Normal sinus rhythm


Tracing visualized and Interpreted by me. Rhythm:  Normal sinus rhythm 


Rate: 77 bpm


QTc: 468


Morphology: No_significant_ST_elevations_or_depressions, No STEMI


Impression:  Normal_sinus_rhythm_without_significant_abnormality; Q waves 

anterior leads.  Q waves inferior leads





Rhythm Strip Diag. Results


Rhythm Strip Time:  16:44


EP Interpretation:  yes


Rate:  77


Rhythm:  NSR, no PVC's, no ectopy





Chest X-Ray Diagnostic Results


Chest X-Ray Diagnostic Results :  


   PA Scribe Text


Chest X-Ray: 


Views: 1 view(s)


Indication: Syncope


Findings: Normal heart size.  Mediastinum normal.  No infiltrate.


Impression: Hyperinflated lungs.  Atelectasis.  No pleural effusions.  No 

pneumothorax or rib fracture


The X-ray(s) were independently viewed and interpreted contemporaneously


Electronically signed by Selina brito DO


Reevaluation Time:  17:24





Last Vital Signs








  Date Time  Temp Pulse Resp B/P (MAP) Pulse Ox O2 Delivery O2 Flow Rate FiO2


 


8/7/20 14:38 98.2 85 19 120/77 (91) 94 Room Air  








Status:  improved


Disposition:  ADMITTED AS INPATIENT - Transfer to [facility]


Admit Decision Time:  15:04


Condition:  Serious











Selina Chaudhry D.O. Aug 7, 2020 15:04

## 2021-01-28 ENCOUNTER — HOSPITAL ENCOUNTER (INPATIENT)
Dept: HOSPITAL 72 - EMR | Age: 71
LOS: 7 days | Discharge: NURSING, REHAB OR LONG-TERM CARE | DRG: 871 | End: 2021-02-04
Payer: MEDICARE

## 2021-01-28 VITALS — HEIGHT: 70 IN | WEIGHT: 128 LBS | BODY MASS INDEX: 18.32 KG/M2

## 2021-01-28 VITALS — DIASTOLIC BLOOD PRESSURE: 71 MMHG | SYSTOLIC BLOOD PRESSURE: 122 MMHG

## 2021-01-28 VITALS — SYSTOLIC BLOOD PRESSURE: 121 MMHG | DIASTOLIC BLOOD PRESSURE: 72 MMHG

## 2021-01-28 VITALS — DIASTOLIC BLOOD PRESSURE: 70 MMHG | SYSTOLIC BLOOD PRESSURE: 124 MMHG

## 2021-01-28 DIAGNOSIS — Z86.711: ICD-10-CM

## 2021-01-28 DIAGNOSIS — G92: ICD-10-CM

## 2021-01-28 DIAGNOSIS — R62.7: ICD-10-CM

## 2021-01-28 DIAGNOSIS — Z86.73: ICD-10-CM

## 2021-01-28 DIAGNOSIS — N39.0: ICD-10-CM

## 2021-01-28 DIAGNOSIS — Z86.19: ICD-10-CM

## 2021-01-28 DIAGNOSIS — Z79.01: ICD-10-CM

## 2021-01-28 DIAGNOSIS — N17.9: ICD-10-CM

## 2021-01-28 DIAGNOSIS — E86.0: ICD-10-CM

## 2021-01-28 DIAGNOSIS — R65.20: ICD-10-CM

## 2021-01-28 DIAGNOSIS — B96.89: ICD-10-CM

## 2021-01-28 DIAGNOSIS — K74.60: ICD-10-CM

## 2021-01-28 DIAGNOSIS — N40.0: ICD-10-CM

## 2021-01-28 DIAGNOSIS — R29.6: ICD-10-CM

## 2021-01-28 DIAGNOSIS — A41.9: Primary | ICD-10-CM

## 2021-01-28 DIAGNOSIS — G40.909: ICD-10-CM

## 2021-01-28 DIAGNOSIS — J44.9: ICD-10-CM

## 2021-01-28 LAB
ADD MANUAL DIFF: NO
ALBUMIN SERPL-MCNC: 3 G/DL (ref 3.4–5)
ALBUMIN/GLOB SERPL: 0.8 {RATIO} (ref 1–2.7)
ALP SERPL-CCNC: 206 U/L (ref 46–116)
ALT SERPL-CCNC: 24 U/L (ref 12–78)
ANION GAP SERPL CALC-SCNC: 5 MMOL/L (ref 5–15)
APPEARANCE UR: (no result)
APTT BLD: 28 SEC (ref 23–33)
APTT PPP: YELLOW S
AST SERPL-CCNC: 25 U/L (ref 15–37)
BASOPHILS NFR BLD AUTO: 0.6 % (ref 0–2)
BILIRUB SERPL-MCNC: 0.3 MG/DL (ref 0.2–1)
BUN SERPL-MCNC: 20 MG/DL (ref 7–18)
CALCIUM SERPL-MCNC: 9.8 MG/DL (ref 8.5–10.1)
CHLORIDE SERPL-SCNC: 109 MMOL/L (ref 98–107)
CK SERPL-CCNC: 207 U/L (ref 26–308)
CO2 SERPL-SCNC: 32 MMOL/L (ref 21–32)
CREAT SERPL-MCNC: 0.9 MG/DL (ref 0.55–1.3)
EOSINOPHIL NFR BLD AUTO: 0.4 % (ref 0–3)
ERYTHROCYTE [DISTWIDTH] IN BLOOD BY AUTOMATED COUNT: 16.8 % (ref 11.6–14.8)
GLOBULIN SER-MCNC: 4 G/DL
GLUCOSE UR STRIP-MCNC: NEGATIVE MG/DL
HCT VFR BLD CALC: 40.3 % (ref 42–52)
HGB BLD-MCNC: 14.2 G/DL (ref 14.2–18)
INR PPP: 1.1 (ref 0.9–1.1)
KETONES UR QL STRIP: (no result)
LEUKOCYTE ESTERASE UR QL STRIP: (no result)
LYMPHOCYTES NFR BLD AUTO: 25.7 % (ref 20–45)
MCV RBC AUTO: 96 FL (ref 80–99)
MONOCYTES NFR BLD AUTO: 9.9 % (ref 1–10)
NEUTROPHILS NFR BLD AUTO: 63.4 % (ref 45–75)
NITRITE UR QL STRIP: NEGATIVE
PH UR STRIP: 5 [PH] (ref 4.5–8)
PLATELET # BLD: 216 K/UL (ref 150–450)
POTASSIUM SERPL-SCNC: 3.8 MMOL/L (ref 3.5–5.1)
PROT UR QL STRIP: (no result)
RBC # BLD AUTO: 4.18 M/UL (ref 4.7–6.1)
SODIUM SERPL-SCNC: 146 MMOL/L (ref 136–145)
SP GR UR STRIP: 1.02 (ref 1–1.03)
UROBILINOGEN UR-MCNC: 1 MG/DL (ref 0–1)
WBC # BLD AUTO: 5.1 K/UL (ref 4.8–10.8)

## 2021-01-28 PROCEDURE — 99285 EMERGENCY DEPT VISIT HI MDM: CPT

## 2021-01-28 PROCEDURE — 82150 ASSAY OF AMYLASE: CPT

## 2021-01-28 PROCEDURE — 96365 THER/PROPH/DIAG IV INF INIT: CPT

## 2021-01-28 PROCEDURE — 83735 ASSAY OF MAGNESIUM: CPT

## 2021-01-28 PROCEDURE — 87517 HEPATITIS B DNA QUANT: CPT

## 2021-01-28 PROCEDURE — 70450 CT HEAD/BRAIN W/O DYE: CPT

## 2021-01-28 PROCEDURE — 85651 RBC SED RATE NONAUTOMATED: CPT

## 2021-01-28 PROCEDURE — 87516 HEPATITIS B DNA AMP PROBE: CPT

## 2021-01-28 PROCEDURE — 86803 HEPATITIS C AB TEST: CPT

## 2021-01-28 PROCEDURE — 84134 ASSAY OF PREALBUMIN: CPT

## 2021-01-28 PROCEDURE — 84484 ASSAY OF TROPONIN QUANT: CPT

## 2021-01-28 PROCEDURE — 80053 COMPREHEN METABOLIC PANEL: CPT

## 2021-01-28 PROCEDURE — 87040 BLOOD CULTURE FOR BACTERIA: CPT

## 2021-01-28 PROCEDURE — 82140 ASSAY OF AMMONIA: CPT

## 2021-01-28 PROCEDURE — 82550 ASSAY OF CK (CPK): CPT

## 2021-01-28 PROCEDURE — 85730 THROMBOPLASTIN TIME PARTIAL: CPT

## 2021-01-28 PROCEDURE — 36415 COLL VENOUS BLD VENIPUNCTURE: CPT

## 2021-01-28 PROCEDURE — 87086 URINE CULTURE/COLONY COUNT: CPT

## 2021-01-28 PROCEDURE — 81003 URINALYSIS AUTO W/O SCOPE: CPT

## 2021-01-28 PROCEDURE — 87340 HEPATITIS B SURFACE AG IA: CPT

## 2021-01-28 PROCEDURE — 80048 BASIC METABOLIC PNL TOTAL CA: CPT

## 2021-01-28 PROCEDURE — 86703 HIV-1/HIV-2 1 RESULT ANTBDY: CPT

## 2021-01-28 PROCEDURE — 83690 ASSAY OF LIPASE: CPT

## 2021-01-28 PROCEDURE — 86140 C-REACTIVE PROTEIN: CPT

## 2021-01-28 PROCEDURE — 83605 ASSAY OF LACTIC ACID: CPT

## 2021-01-28 PROCEDURE — 85025 COMPLETE CBC W/AUTO DIFF WBC: CPT

## 2021-01-28 PROCEDURE — 76700 US EXAM ABDOM COMPLETE: CPT

## 2021-01-28 PROCEDURE — 93005 ELECTROCARDIOGRAM TRACING: CPT

## 2021-01-28 PROCEDURE — 86704 HEP B CORE ANTIBODY TOTAL: CPT

## 2021-01-28 PROCEDURE — 71045 X-RAY EXAM CHEST 1 VIEW: CPT

## 2021-01-28 PROCEDURE — 83880 ASSAY OF NATRIURETIC PEPTIDE: CPT

## 2021-01-28 PROCEDURE — 84100 ASSAY OF PHOSPHORUS: CPT

## 2021-01-28 PROCEDURE — 85610 PROTHROMBIN TIME: CPT

## 2021-01-28 NOTE — NUR
ED Nurse Note: rcv'd pt from initial RN. Pt is A&Ox3, unsteady on his feet- pt 
states he uses his wheelchair at home. Has an aide that helps him usually. Pt 
states his is in no pain at this time. Using urinal at bedside. IV in left AC 
is patent- no redness, swelling or pain. Pts belongings: blue shirt, gray robe, 
phone  and smart phone.

## 2021-01-28 NOTE — DIAGNOSTIC IMAGING REPORT
EXAM: CT CT Head no Contrast

 

INDICATION:  Altered mental status.

 

TECHNIQUE: 

Axial images of the brain were obtained with subsequent sagittal and coronal

reformats.

 

All CT scans at this facility are performed using dose modulation techniques as

appropriate to a performed exam including the following: automated exposure control

with  adjustment of the mA and/or kV according to patient size.

 

COMPARISON STUDY:   None.

 

RADIATION DOSE:  

CTDIvol:   53.4 mGy

DLP:   1125.7 mGy-cm

Dose information generated by the CT scanner is available in PACS.

 

FINDINGS:

 

There is age related senescent changes with ventricular and sulcal prominence. White

matter micro-ischemic changes noted. There is an area of asymmetric volume loss in

the right temporal lobe likely from an old infarct. There is no acute large territory

cortical infarct, hemorrhage, mass effect or shift. Ventricles and cisterns as well

as brainstem and posterior fossa appear unremarkable. The sellar region is normal. 

Sinuses, mastoid air cells and bony calvarium appear intact.

 

IMPRESSION:

 

AGE RELATED SENESCENT CHANGES.

 

SMALL AREA OF ASYMMETRIC VOLUME LOSS IN THE RIGHT TEMPORAL LOBE LIKELY AN OLD

INFARCT. NO ACUTE INTRACRANIAL ABNORMALITY.

## 2021-01-28 NOTE — EMERGENCY ROOM REPORT
History of Present Illness


General


Chief Complaint:  Generalized Weakness


Source:  Patient, EMS





Present Illness


HPI


Patient is a 70-year-old male past medical history of COPD, CVA and seizure 

disorder on Dilantin at home who was brought in by EMS from home for generalized

weakness.  EMS was called by the patient's physical therapist who has been 

helping rehab him after an injury last spring.  The physical therapist noted 

that she felt like he was more confused than usual and had increasing 

generalized weakness.  Patient states that his only complaint is mild right 

upper quadrant and flank pain.  He states that is been present for the last 

couple days.  He states that he has a history of hepatitis B and liver 

cirrhosis.  He denies any fever or chills.  He denies any chest pain, shortness 

of breath or cough.  He denies any focal weakness.  He denies any slurred 

speech.  He denies any recent falls.


Allergies:  


Coded Allergies:  


     SHELLFISH DERIVED (Unverified  Allergy, Intermediate, ITCHING/HIVES, 

7/25/14)


     IODINE (Unverified  Allergy, Unknown, 7/25/14)


Uncoded Allergies:  


     SPINACH (Allergy, Intermediate, SEVERE ITCHING/HIVES, 7/25/14)





COVID-19 Screening


Contact w/high risk pt:  No


Recent Travel to affected area:  No


Experienced COVID-19 symptoms?:  No


COVID-19 Testing performed PTA:  No





Patient History


Reviewed Nursing Documentation:  PMH: Agreed; PSxH: Agreed





Nursing Documentation-PMH


Past Medical History:  No History, Except For


Hx COPD:  Yes


Hx Cancer:  Yes - BASAL CELL CARCINOMA SKIN


Hx Gastrointestinal Problems:  Yes


Hx Neurological Problems:  Yes


Hx Seizures:  Yes


Hx Epilepsy:  Yes - Well controlled with medications - last seizure 35 yrs ago


Hx Syncope:  Yes - THIS AM


Hx Headaches:  Yes - MIGRAINES


Hx Numbness:  Yes - TIPS OF THE TOES





Review of Systems


All Other Systems:  negative except mentioned in HPI





Physical Exam





Vital Signs








  Date Time  Temp Pulse Resp B/P (MAP) Pulse Ox O2 Delivery O2 Flow Rate FiO2


 


1/28/21 14:30 98.1 88 20 125/66 (85) 96 Room Air  








Sp02 EP Interpretation:  reviewed, normal


General Appearance:  no apparent distress, alert, GCS 15, non-toxic


Head:  normocephalic, atraumatic


Eyes:  bilateral eye normal inspection, bilateral eye PERRL


ENT:  hearing grossly normal, normal pharynx, no angioedema, normal voice


Neck:  full range of motion, supple/symm/no masses


Respiratory:  chest non-tender, lungs clear, normal breath sounds, speaking full

sentences


Cardiovascular #1:  regular rate, rhythm


Gastrointestinal:  other - Right upper quadrant palpable liver no tenderness to 

palpation no guarding or rebound


Rectal:  deferred


Musculoskeletal:  other - Symmetric mild bilateral lower extremity weakness no 

focal weakness sensation intact


Neurologic:  CNs III-XII nml as tested, oriented x3


Psychiatric:  no suicidal/homicidal ideation


Skin:  no rash


Lymphatic:  no adenopathy





Medical Decision Making


Diagnostic Impression:  


   Primary Impression:  


   Encephalopathy


   Additional Impressions:  


   UTI (urinary tract infection)


   Weakness


ER Course


Patient here for increasing confusion and weakness.  CT brain demonstrates no 

acute intracranial pathology.  Patient's labs demonstrate UTI.  Patient has been

started on Rocephin.  Lactate is normal.  Patient will be admitted for further 

treatment and evaluation.





Laboratory Tests








Test


 1/28/21


15:20 1/28/21


16:30 1/28/21


17:20


 


White Blood Count


 5.1 K/UL


(4.8-10.8) 


 





 


Red Blood Count


 4.18 M/UL


(4.70-6.10)  L 


 





 


Hemoglobin


 14.2 G/DL


(14.2-18.0) 


 





 


Hematocrit


 40.3 %


(42.0-52.0)  L 


 





 


Mean Corpuscular Volume 96 FL (80-99)    


 


Mean Corpuscular Hemoglobin


 34.0 PG


(27.0-31.0)  H 


 





 


Mean Corpuscular Hemoglobin


Concent 35.3 G/DL


(32.0-36.0) 


 





 


Red Cell Distribution Width


 16.8 %


(11.6-14.8)  H 


 





 


Platelet Count


 216 K/UL


(150-450) 


 





 


Mean Platelet Volume


 6.9 FL


(6.5-10.1) 


 





 


Neutrophils (%) (Auto)


 63.4 %


(45.0-75.0) 


 





 


Lymphocytes (%) (Auto)


 25.7 %


(20.0-45.0) 


 





 


Monocytes (%) (Auto)


 9.9 %


(1.0-10.0) 


 





 


Eosinophils (%) (Auto)


 0.4 %


(0.0-3.0) 


 





 


Basophils (%) (Auto)


 0.6 %


(0.0-2.0) 


 





 


Prothrombin Time


 11.8 SEC


(9.30-11.50)  H 


 





 


Prothrombin Time INR 1.1 (0.9-1.1)    


 


Activated Partial


Thromboplast Time 28 SEC (23-33)


 


 





 


Lactic Acid Level


 1.80 mmol/L


(0.4-2.0) 


 





 


Ammonia


 < 10 umol/L


(11-32)  L 


 





 


Troponin I


 0.000 ng/mL


(0.000-0.056) 


 





 


Sodium Level


 


 146 MMOL/L


(136-145)  H 





 


Potassium Level


 


 3.8 MMOL/L


(3.5-5.1) 





 


Chloride Level


 


 109 MMOL/L


()  H 





 


Carbon Dioxide Level


 


 32 MMOL/L


(21-32) 





 


Anion Gap


 


 5 mmol/L


(5-15) 





 


Blood Urea Nitrogen


 


 20 mg/dL


(7-18)  H 





 


Creatinine


 


 0.9 MG/DL


(0.55-1.30) 





 


Estimated Glomerular


Filtration Rate 


 > 60 mL/min


(>60) 





 


Glucose Level


 


 95 MG/DL


() 





 


Calcium Level


 


 9.8 MG/DL


(8.5-10.1) 





 


Magnesium Level


 


 2.2 MG/DL


(1.8-2.4) 





 


Total Bilirubin


 


 0.3 MG/DL


(0.2-1.0) 





 


Aspartate Amino Transferase


(AST) 


 25 U/L (15-37)


 





 


Alanine Aminotransferase (ALT)


 


 24 U/L (12-78)


 





 


Alkaline Phosphatase


 


 206 U/L


()  H 





 


Total Creatine Kinase


 


 207 U/L


() 





 


Pro-B-Type Natriuretic Peptide


 


 95 pg/mL


(0-125) 





 


Total Protein


 


 7.0 G/DL


(6.4-8.2) 





 


Albumin


 


 3.0 G/DL


(3.4-5.0)  L 





 


Globulin  4.0 g/dL   


 


Albumin/Globulin Ratio


 


 0.8 (1.0-2.7)


L 





 


Urine Color   Yellow  


 


Urine Appearance   Very cloudy  


 


Urine pH   5 (4.5-8.0)  


 


Urine Specific Gravity


 


 


 1.020


(1.005-1.035)


 


Urine Protein


 


 


 4+ (NEGATIVE)


H


 


Urine Glucose (UA)


 


 


 Negative


(NEGATIVE)


 


Urine Ketones


 


 


 2+ (NEGATIVE)


H


 


Urine Blood


 


 


 3+ (NEGATIVE)


H


 


Urine Nitrite


 


 


 Negative


(NEGATIVE)


 


Urine Bilirubin


 


 


 Negative


(NEGATIVE)


 


Urine Urobilinogen


 


 


 1 MG/DL


(0.0-1.0)  H


 


Urine Leukocyte Esterase


 


 


 3+ (NEGATIVE)


H


 


Urine RBC


 


 


 0-2 /HPF (0 -


0)  H


 


Urine WBC


 


 


 Tntc /HPF (0 -


0)  H


 


Urine Squamous Epithelial


Cells 


 


 None /LPF


(NONE/OCC)


 


Urine Bacteria


 


 


 Many /HPF


(NONE)  H








EKG Diagnostic Results


Troponin ordered:  Yes


When was troponin ordered?:  Jan 28, 2021


EKG Time:  16:12


EP Interpretation:  Yolette Easton MD


Rate:  normal - 78 bpm


Rhythm:  NSR


ST Segments:  no acute changes


ASA given to the pt in ED:  No





Rhythm Strip Diag. Results


Rhythm Strip Time:  16:23


EP Interpretation:  yes


Rate:  82 bpm


Rhythm:  NSR, no PVC's, no ectopy





Chest X-Ray Diagnostic Results


Chest X-Ray Diagnostic Results :  


   Chest X-Ray Ordered:  Yes


   # of Views/Limited/Complete:  1 View


   Indication:  Other - Weakness


   EP Interpretation:  Yes


   Interpretation:  no consolidation, no effusion, no pneumothorax, no acute 

cardiopulmonary disease


   Impression:  No acute disease





Last Vital Signs








  Date Time  Temp Pulse Resp B/P (MAP) Pulse Ox O2 Delivery O2 Flow Rate FiO2


 


1/28/21 14:30 98.1 88 20 125/66 (85) 96 Room Air  








Disposition:  ADMITTED AS INPATIENT - [facility]


Condition:  Critical


Physician Consult:  Dr. Cynthia MD at 845pm





Additional Instructions:  


Please note that this report is being documented using DRAGON technology.


This can lead to erroneous entry secondary to incorrect interpretation by the 

dictating instrument.











Yolette Easton M.D.        Jan 28, 2021 14:56

## 2021-01-28 NOTE — NUR
TRANSFER TO FLOOR:

Patient transferred to Med Surg 415-2 via Er tech on [facility].   Report given to 
TERESITA Sun.  Belongings given to ED RN and transferred to ED tech in pts 
presence. Family and or S/O informed of transfer.

## 2021-01-28 NOTE — DIAGNOSTIC IMAGING REPORT
EXAM: ULTRASOUND US ABD Complete

 

CLINICAL HISTORY: Abdominal pain.

 

COMPARISON:  None

 

TECHNIQUE: Ultrasound examination of the abdomen includes grayscale images, and color

and spectral doppler analysis.

 

FINDINGS:  

 

Study is technically difficult.

 

The liver demonstrates diffuse coarse hepatic echotexture. No ductal dilatation

noted. Spleen is unremarkable. The gallbladder is without sludge or stone. Common

bile duct measures 2 mm. The pancreas, aorta and cava are grossly unremarkable to the

extent visualized appear  The kidneys are normal in size, shape and axis.  No ascites

noted.

 

IMPRESSION:

 

COARSE HEPATIC ECHOTEXTURE WHICH MAY BE RELATED THE PATIENT'S HISTORY OF HEPATITIS B.

NO RAFFAELE CIRRHOSIS OR SPACE-OCCUPYING LESION SEEN PRESENTLY.

## 2021-01-28 NOTE — NUR
NURSE NOTES: ADMITTED 70 YEAR OLD MALE FROM EMERGENCY DEPARTMENT TO ROOM 415 BED 2 WITH 
DIAGNOSIS ALTERED MENTAL STATUS/URINARY TRACT INFECTION, UNDER THE CARE OF DR. PATEL. 
PATIENT ALERT/ORIENTED TO PERSON/PLACE/YEAR/PURPOSE, DENIES PAIN, NO SIGNS AND SYMPTOMS OF 
ACUTE CARDIO RESPIRATORY DISTRESS/SHORTNESS OF BREATH, DENIES CHEST PAIN, NO PERIPHERAL 
EDEMA NOTED. NOTED WITH MULTIPLE SCABS TO LOWER EXTREMITIES SECONDARY TO RECENT FALL, LOOSE 
TOE NAIL TO SECOND DIGIT ON LEFT FOOT. ABDOMEN SOFT/NON DISTENDED/NON TENDER/AUDIBLE BOWEL 
SOUNDS, DENIES N/V/D. WEAKNESS NOTED TO LOWER EXTREMITIES, MORE PRONOUNCED ON RIGHT LOWER 
EXTREMITY, WEARING DEPEND, SOILED, CARE PROVIDED, OFFERED URINAL. ORIENTATED PATIENT TO 
ROOM/ENVIRONMENT. SIDE RAILS UP X3/BED IN LOWEST POSITION FOR SAFETY, ENCOURAGED PATIENT TO 
UTILIZE CALL LIGHT FOR ASSISTANCE, VERBALIZED UNDERSTANDING. VITALS STABLE, AFEBRILE. NAD.

## 2021-01-28 NOTE — DIAGNOSTIC IMAGING REPORT
Procedure: XRAY Chest 1v

Reason for study: Altered mental status

 

Comparison films:  08/07/2020.

 

FINDINGS:

 

A single one view chest is obtained. Vascularity is normal. The lung fields are clear

bilaterally. Cardiac and mediastinal silhouette are within normal limits. CP angles

are sharp.  The bony thorax appear unremarkable.

 

IMPRESSION:  

 

NO ACUTE CARDIOPULMONARY DISEASE.

## 2021-01-29 VITALS — SYSTOLIC BLOOD PRESSURE: 146 MMHG | DIASTOLIC BLOOD PRESSURE: 81 MMHG

## 2021-01-29 VITALS — SYSTOLIC BLOOD PRESSURE: 121 MMHG | DIASTOLIC BLOOD PRESSURE: 73 MMHG

## 2021-01-29 VITALS — DIASTOLIC BLOOD PRESSURE: 71 MMHG | SYSTOLIC BLOOD PRESSURE: 104 MMHG

## 2021-01-29 VITALS — DIASTOLIC BLOOD PRESSURE: 69 MMHG | SYSTOLIC BLOOD PRESSURE: 129 MMHG

## 2021-01-29 VITALS — SYSTOLIC BLOOD PRESSURE: 120 MMHG | DIASTOLIC BLOOD PRESSURE: 74 MMHG

## 2021-01-29 VITALS — SYSTOLIC BLOOD PRESSURE: 121 MMHG | DIASTOLIC BLOOD PRESSURE: 70 MMHG

## 2021-01-29 LAB
ADD MANUAL DIFF: NO
ALBUMIN SERPL-MCNC: 3.3 G/DL (ref 3.4–5)
ALBUMIN/GLOB SERPL: 0.8 {RATIO} (ref 1–2.7)
ALP SERPL-CCNC: 229 U/L (ref 46–116)
ALT SERPL-CCNC: 20 U/L (ref 12–78)
ANION GAP SERPL CALC-SCNC: 9 MMOL/L (ref 5–15)
AST SERPL-CCNC: 27 U/L (ref 15–37)
BASOPHILS NFR BLD AUTO: 1.2 % (ref 0–2)
BILIRUB SERPL-MCNC: 0.3 MG/DL (ref 0.2–1)
BUN SERPL-MCNC: 17 MG/DL (ref 7–18)
CALCIUM SERPL-MCNC: 10 MG/DL (ref 8.5–10.1)
CHLORIDE SERPL-SCNC: 107 MMOL/L (ref 98–107)
CO2 SERPL-SCNC: 29 MMOL/L (ref 21–32)
CREAT SERPL-MCNC: 1 MG/DL (ref 0.55–1.3)
EOSINOPHIL NFR BLD AUTO: 0.7 % (ref 0–3)
ERYTHROCYTE [DISTWIDTH] IN BLOOD BY AUTOMATED COUNT: 16.6 % (ref 11.6–14.8)
GLOBULIN SER-MCNC: 4.4 G/DL
HCT VFR BLD CALC: 40.6 % (ref 42–52)
HGB BLD-MCNC: 14.2 G/DL (ref 14.2–18)
LYMPHOCYTES NFR BLD AUTO: 21.9 % (ref 20–45)
MCV RBC AUTO: 98 FL (ref 80–99)
MONOCYTES NFR BLD AUTO: 8.2 % (ref 1–10)
NEUTROPHILS NFR BLD AUTO: 68.1 % (ref 45–75)
PLATELET # BLD: 228 K/UL (ref 150–450)
POTASSIUM SERPL-SCNC: 4 MMOL/L (ref 3.5–5.1)
RBC # BLD AUTO: 4.12 M/UL (ref 4.7–6.1)
SODIUM SERPL-SCNC: 145 MMOL/L (ref 136–145)
WBC # BLD AUTO: 6 K/UL (ref 4.8–10.8)

## 2021-01-29 RX ADMIN — TAMSULOSIN HYDROCHLORIDE SCH MG: 0.4 CAPSULE ORAL at 20:17

## 2021-01-29 RX ADMIN — DEXTROSE, SODIUM CHLORIDE, AND POTASSIUM CHLORIDE SCH MLS/HR: 5; .45; .15 INJECTION INTRAVENOUS at 02:00

## 2021-01-29 RX ADMIN — LATANOPROST SCH DROP: 50 SOLUTION OPHTHALMIC at 20:18

## 2021-01-29 RX ADMIN — DEXTROSE, SODIUM CHLORIDE, AND POTASSIUM CHLORIDE SCH MLS/HR: 5; .45; .15 INJECTION INTRAVENOUS at 15:49

## 2021-01-29 RX ADMIN — SODIUM CHLORIDE SCH MLS/HR: 0.9 INJECTION INTRAVENOUS at 18:11

## 2021-01-29 RX ADMIN — PHENYTOIN SODIUM SCH MG: 100 CAPSULE, EXTENDED RELEASE ORAL at 20:17

## 2021-01-29 RX ADMIN — TAMSULOSIN HYDROCHLORIDE SCH MG: 0.4 CAPSULE ORAL at 01:59

## 2021-01-29 NOTE — CONSULTATION
History of Present Illness


General


Date patient seen:  Jan 29, 2021


Reason for Hospitalization:  Generalized Weakness





Present Illness


HPI


This is a 70-year-old male with past medical history of COPD, CVA and seizure 

disorder on Dilantin at home who was brought in by EMS from home for generalized

weakness.  EMS was called by the patient's physical therapist who has been 

helping rehab him after an injury last spring.  The physical therapist noted 

that she felt like he was more confused than usual and had increasing 

generalized weakness.  Patient states that his only complaint is mild right 

upper quadrant and flank pain.  He states that is been present for the last 

couple days.  He states that he has a history of hepatitis B and liver 

cirrhosis.  He denies any fever or chills.  He denies any chest pain, shortness 

of breath or cough.  He denies any focal weakness.  He denies any slurred 

speech.  He denies any recent falls.  abnormal labs, lft's surgery called to 

evaluate and assist with care.


Allergies:  


Coded Allergies:  


     SHELLFISH DERIVED (Unverified  Allergy, Intermediate, ITCHING/HIVES, 

7/25/14)


     IODINE (Unverified  Allergy, Unknown, 7/25/14)


Uncoded Allergies:  


     SPINACH (Allergy, Intermediate, SEVERE ITCHING/HIVES, 7/25/14)





COVID-19 Screening


Contact w/high risk pt:  No


Recent Travel to affected area:  No


Experienced COVID-19 symptoms?:  No





Medication History


Scheduled


Acetaminophen* (Acetaminophen 325MG Tablet*), 325 MG ORAL Q4H, (Reported)


Amitriptyline Hcl* (Amitriptyline Hcl*), 75 MG ORAL BEDTIME, (Reported)


Lansoprazole* (Lansoprazole*), 30 MG ORAL DAILY, (Reported)


Latanoprost* (Xalatan*), 1 DROP BOTH EYES BEDTIME, (Reported)


Phenytoin Sodium Extended* (Dilantin*), 100 MG ORAL THREE TIMES A DAY, 

(Reported)


Phenytoin Sodium Extended* (Dilantin*), 100 MG ORAL THREE TIMES A DAY, 

(Reported)


Primidone* (Mysoline*), 250 MG ORAL THREE TIMES A DAY


Tamsulosin Hcl (Tamsulosin Hcl*), 0.4 MG ORAL BEDTIME, (Reported)


Tolterodine Tartrate (Tolterodine Tartrate), 4 MG PO DAILY, (Reported)


Warfarin Sod* (Warfarin Sod*), 7.5 MG ORAL DAILY, (Reported)





Scheduled PRN


Amitriptyline HCl (Amitriptyline HCl), 10 MG ORAL BEDTIME PRN for For Headache, 

(Reported)


Sumatriptan Succinate (Sumatriptan Succinate), 100 MG PO for For Headache, 

(Reported)


Sumatriptan Succinate (Sumatriptan Succinate), 100 MG PO TWICE A DAY PRN for For

Headache, (Reported)





Patient History


History Provided By:  Patient, Medical Record, PMD


Healthcare decision maker





Resuscitation status





Advanced Directive on File








Past Medical/Surgical History


Past Medical/Surgical History:  


(1) Falls frequently


(2) Cerebellar atrophy


(3) Encephalopathy


(4) Weakness


(5) UTI (urinary tract infection)


(6) Altered mental status


(7) Hip fracture, intertrochanteric


(8) Seizure disorder, complex partial


(9) Pulmonary embolism


(10) ACS (acute coronary syndrome)


(11) Multiple injuries due to trauma





Review of Systems


Review of Symptoms


General ROS: no weight loss or fever


Psychological ROS: no depression or mood changes, no memory loss


Ophthalmic ROS: no visual changes or eye irritation


ENT ROS: no nasal congestion, hearing loss, dizziness


Allergy and Immunology ROS: no allergic symptoms or urticaria


Hematological and Lymphatic ROS: no swollen glands, unusual bleeding or bruising


Endocrine ROS: no polyuria, polydipsia, weight changes, temperature intolerance


Respiratory ROS: no cough, shortness of breath, or wheezing


Cardiovascular ROS: no chest pain or dyspnea on exertion


Gastrointestinal ROS: denies abdominal pain, bright red blood in stool.


Musculoskeletal ROS: no myalgias or arthralgias


Neurological ROS: no TIA or stroke symptoms


Dermatological ROS: no new or changing skin lesions, rashes or pruritis





Physical Exam


Physical Exam


General appearance:  alert, cooperative, no distress, appears stated age


Head:  Normocephalic, without obvious abnormality, atraumatic


Eyes:  conjunctivae/corneas clear. PERRL, EOM's intact. Fundi benign


Throat:  Lips, mucosa, and tongue normal. Teeth and gums normal


Neck:  supple, symmetrical, trachea midline, no adenopathy, thyroid: not enlarg

ed, symmetric, no tenderness/mass/nodules, no carotid bruit and no JVD


Lungs:  clear to auscultation bilaterally


Heart:  regular rate and rhythm, S1, S2 normal, no murmur, click, rub or gallop


Abdomen:  soft, non-tender. Bowel sounds normal. No masses,  no organomegaly


Extremities:  extremities normal, atraumatic, no cyanosis or edema


Pulses:  2+ and symmetric


Skin:  Skin color, texture, turgor normal. No rashes or lesions


Neurologic:  Grossly normal





Last 24 Hour Vital Signs








  Date Time  Temp Pulse Resp B/P (MAP) Pulse Ox O2 Delivery O2 Flow Rate FiO2


 


1/29/21 12:00 98.7 89 19 104/71 (82) 96   


 


1/29/21 09:00      Room Air  


 


1/29/21 08:00 98.4 88 17 146/81 (102) 96   


 


1/29/21 04:00 97.9 81 18 129/69 (89) 99   


 


1/29/21 00:00 98.7 72 20 120/74 (89) 97   


 


1/28/21 22:31      Room Air  


 


1/28/21 22:15 97.6 77 20 124/70 (88) 99   


 


1/28/21 21:56 98.1 79 20 122/71 25 Room Air  100


 


1/28/21 16:36  79 20 122/71 25 Room Air  


 


1/28/21 15:28  75 20   Room Air  100


 


1/28/21 15:27  75 20 121/72 97 Room Air  

















Intake and Output  


 


 1/28/21 1/29/21





 19:00 07:00


 


Intake Total  795 ml


 


Output Total 30 ml 500 ml


 


Balance -30 ml 295 ml


 


  


 


Intake Oral  420 ml


 


IV Total  375 ml


 


Output Urine Total 30 ml 500 ml


 


# Voids 1 











Laboratory Tests








Test


 1/28/21


15:20 1/28/21


16:30 1/28/21


17:20 1/29/21


04:45


 


White Blood Count


 5.1 K/UL


(4.8-10.8) 


 


 6.0 K/UL


(4.8-10.8)


 


Red Blood Count


 4.18 M/UL


(4.70-6.10)  L 


 


 4.12 M/UL


(4.70-6.10)  L


 


Hemoglobin


 14.2 G/DL


(14.2-18.0) 


 


 14.2 G/DL


(14.2-18.0)


 


Hematocrit


 40.3 %


(42.0-52.0)  L 


 


 40.6 %


(42.0-52.0)  L


 


Mean Corpuscular Volume 96 FL (80-99)     98 FL (80-99)  


 


Mean Corpuscular Hemoglobin


 34.0 PG


(27.0-31.0)  H 


 


 34.5 PG


(27.0-31.0)  H


 


Mean Corpuscular Hemoglobin


Concent 35.3 G/DL


(32.0-36.0) 


 


 35.0 G/DL


(32.0-36.0)


 


Red Cell Distribution Width


 16.8 %


(11.6-14.8)  H 


 


 16.6 %


(11.6-14.8)  H


 


Platelet Count


 216 K/UL


(150-450) 


 


 228 K/UL


(150-450)


 


Mean Platelet Volume


 6.9 FL


(6.5-10.1) 


 


 6.9 FL


(6.5-10.1)


 


Neutrophils (%) (Auto)


 63.4 %


(45.0-75.0) 


 


 68.1 %


(45.0-75.0)


 


Lymphocytes (%) (Auto)


 25.7 %


(20.0-45.0) 


 


 21.9 %


(20.0-45.0)


 


Monocytes (%) (Auto)


 9.9 %


(1.0-10.0) 


 


 8.2 %


(1.0-10.0)


 


Eosinophils (%) (Auto)


 0.4 %


(0.0-3.0) 


 


 0.7 %


(0.0-3.0)


 


Basophils (%) (Auto)


 0.6 %


(0.0-2.0) 


 


 1.2 %


(0.0-2.0)


 


Prothrombin Time


 11.8 SEC


(9.30-11.50)  H 


 


 





 


Prothromb Time International


Ratio 1.1 (0.9-1.1)  


 


 


 





 


Activated Partial


Thromboplast Time 28 SEC (23-33)


 


 


 





 


Lactic Acid Level


 1.80 mmol/L


(0.4-2.0) 


 


 





 


Ammonia


 < 10 umol/L


(11-32)  L 


 


 





 


Troponin I


 0.000 ng/mL


(0.000-0.056) 


 


 





 


Sodium Level


 


 146 MMOL/L


(136-145)  H 


 145 MMOL/L


(136-145)


 


Potassium Level


 


 3.8 MMOL/L


(3.5-5.1) 


 4.0 MMOL/L


(3.5-5.1)


 


Chloride Level


 


 109 MMOL/L


()  H 


 107 MMOL/L


()


 


Carbon Dioxide Level


 


 32 MMOL/L


(21-32) 


 29 MMOL/L


(21-32)


 


Anion Gap


 


 5 mmol/L


(5-15) 


 9 mmol/L


(5-15)


 


Blood Urea Nitrogen


 


 20 mg/dL


(7-18)  H 


 17 mg/dL


(7-18)


 


Creatinine


 


 0.9 MG/DL


(0.55-1.30) 


 1.0 MG/DL


(0.55-1.30)


 


Estimat Glomerular Filtration


Rate 


 > 60 mL/min


(>60) 


 > 60 mL/min


(>60)


 


Glucose Level


 


 95 MG/DL


() 


 106 MG/DL


()


 


Calcium Level


 


 9.8 MG/DL


(8.5-10.1) 


 10.0 MG/DL


(8.5-10.1)


 


Magnesium Level


 


 2.2 MG/DL


(1.8-2.4) 


 2.1 MG/DL


(1.8-2.4)


 


Total Bilirubin


 


 0.3 MG/DL


(0.2-1.0) 


 0.3 MG/DL


(0.2-1.0)


 


Aspartate Amino Transf


(AST/SGOT) 


 25 U/L (15-37)


 


 27 U/L (15-37)





 


Alanine Aminotransferase


(ALT/SGPT) 


 24 U/L (12-78)


 


 20 U/L (12-78)





 


Alkaline Phosphatase


 


 206 U/L


()  H 


 229 U/L


()  H


 


Total Creatine Kinase


 


 207 U/L


() 


 





 


Pro-B-Type Natriuretic Peptide


 


 95 pg/mL


(0-125) 


 





 


Total Protein


 


 7.0 G/DL


(6.4-8.2) 


 7.7 G/DL


(6.4-8.2)


 


Albumin


 


 3.0 G/DL


(3.4-5.0)  L 


 3.3 G/DL


(3.4-5.0)  L


 


Globulin  4.0 g/dL    4.4 g/dL  


 


Albumin/Globulin Ratio


 


 0.8 (1.0-2.7)


L 


 0.8 (1.0-2.7)


L


 


Urine Color   Yellow   


 


Urine Appearance   Very cloudy   


 


Urine pH   5 (4.5-8.0)   


 


Urine Specific Gravity


 


 


 1.020


(1.005-1.035) 





 


Urine Protein


 


 


 4+ (NEGATIVE)


H 





 


Urine Glucose (UA)


 


 


 Negative


(NEGATIVE) 





 


Urine Ketones


 


 


 2+ (NEGATIVE)


H 





 


Urine Blood


 


 


 3+ (NEGATIVE)


H 





 


Urine Nitrite


 


 


 Negative


(NEGATIVE) 





 


Urine Bilirubin


 


 


 Negative


(NEGATIVE) 





 


Urine Urobilinogen


 


 


 1 MG/DL


(0.0-1.0)  H 





 


Urine Leukocyte Esterase


 


 


 3+ (NEGATIVE)


H 





 


Urine RBC


 


 


 0-2 /HPF (0 -


0)  H 





 


Urine WBC


 


 


 Tntc /HPF (0 -


0)  H 





 


Urine Squamous Epithelial


Cells 


 


 None /LPF


(NONE/OCC) 





 


Urine Bacteria


 


 


 Many /HPF


(NONE)  H 





 


Phosphorus Level


 


 


 


 3.0 MG/DL


(2.5-4.9)


 


Test


 1/29/21


11:10 


 


 





 


Hepatitis B Surface Antigen Pending     


 


Hepatitis B Surface Antibody,


Quant Pending  


 


 


 





 


Hepatitis B Core Total


Antibody Pending  


 


 


 





 


Hepatitis B DNA (IU/mL) Pending     


 


Hepatitis C Antibody Pending     


 


HIV (1&2) Antibody Rapid Pending     











Microbiology








 Date/Time


Source Procedure


Growth Status





 


 1/28/21 17:20


Urine,Clean Catch Urine Culture - Preliminary


NO GROWTH Resulted


 


 1/28/21 16:35


Nasopharynx Coronavirus COVID-19 PCR ([facility]) - Final Complete








Height (Feet):  5


Height (Inches):  10.00


Weight (Pounds):  128


Medications





Current Medications








 Medications


  (Trade)  Dose


 Ordered  Sig/Eddie


 Route


 PRN Reason  Start Time


 Stop Time Status Last Admin


Dose Admin


 


 Acetaminophen


  (Tylenol)  325 mg  Q4H  PRN


 ORAL


 For Pain  1/29/21 00:30


 2/28/21 00:29   





 


 Ceftriaxone


 Sodium 1 gm/


 Dextrose  55 ml @ 


 110 mls/hr  Q24H


 IVPB


   1/29/21 18:00


 2/5/21 17:59   





 


 Dextrose/


 Electrolytes  1,000 ml @ 


 75 mls/hr  [id number]


 IV


   1/29/21 02:00


 2/28/21 01:59  1/29/21 02:00





 


 Latanoprost


  (Xalatan)  1 drop  BEDTIME


 BOTH EYES


   1/29/21 21:00


 2/28/21 20:59   





 


 Pantoprazole


  (Protonix)  40 mg  DAILY


 ORAL


   1/29/21 09:00


 2/28/21 08:59  1/29/21 08:26





 


 Phenytoin


  (Dilantin)  300 mg  BEDTIME


 ORAL


   1/29/21 21:00


 2/28/21 20:59   





 


 Primidone


  (Mysoline)  250 mg  TID


 ORAL


   1/29/21 09:00


 2/28/21 08:59  1/29/21 12:57





 


 Sumatriptan


 Succinate


  (Imitrex)  100 mg  TWICE A DAY  PRN


 ORAL


 For Headache  1/29/21 00:15


 2/28/21 00:14   





 


 Tamsulosin HCl


  (Flomax)  0.4 mg  BEDTIME


 ORAL


   1/29/21 01:00


 2/28/21 00:59  1/29/21 01:59














Assessment/Plan


Problem List:  


(1) Abnormal LFTs


Assessment & Plan:  70 year old male with history of hepatitis comes in for 

weakness noted to have abnormal lft's.  


hx hepatitis


alk phos elevated


US noted


The liver demonstrates diffuse coarse hepatic echotexture. No ductal dilatation


noted. Spleen is unremarkable. The gallbladder is without sludge or stone. 

Common


bile duct measures 2 mm. The pancreas, aorta and cava are grossly unremarkable 

to the


extent visualized appear  The kidneys are normal in size, shape and axis.  No 

ascites


noted.


okayf or diet


will monitor with exam


trend labs


thank you 


DAILY ESTIMATED NEEDS:


Needs based on Pulmonary 58kg  


25-35  kcals/kg 


9946-6786  total kcals


1-1.5  g protein/kg


58-87  g total protein 


25-30  mL/kg


0544-8235  total fluid mLs





NUTRITION DIAGNOSIS:


Increased kcal needs r/t Low BMI as evidenced by BMI 18.4, underweight per


guidelines 77% of Ideal Body Weight.





CURRENT DIET: Regular ms chopped    





PO DIET RECOMMENDATIONS:


With good po intake >75%, rec Low Na diet/ texture as tolerated  








ADDITIONAL RECOMMENDATIONS:


1) Monitor po intake, rec SLP eval  


2) Obtain a standing weight as able or calibrated bed scale  


3) Check lytes daily, replete as needed  


4) Add Ensure Enlive BID


ICD Codes:  R94.5 - Abnormal results of liver function studies


SNOMED:  293781533


(2) Encephalopathy


ICD Codes:  G93.40 - Encephalopathy, unspecified


SNOMED:  00666192


(3) Weakness


ICD Codes:  R53.1 - Weakness


SNOMED:  74530602


(4) UTI (urinary tract infection)


ICD Codes:  N39.0 - Urinary tract infection, site not specified


SNOMED:  95435440


(5) Altered mental status


ICD Codes:  R41.82 - Altered mental status, unspecified


SNOMED:  100748009


(6) Falls frequently


ICD Codes:  R29.6 - Repeated falls


SNOMED:  483338421


(7) Cerebellar atrophy


ICD Codes:  G31.9 - Degenerative disease of nervous system, unspecified


SNOMED:  66250684


(8) Hip fracture, intertrochanteric


ICD Codes:  S72.143A - Displaced intertrochanteric fracture of unsp femur, init


SNOMED:  836826843


(9) Seizure disorder, complex partial


ICD Codes:  G40.209 - Localization-related (focal) (partial) symptomatic 

epilepsy and epileptic syndromes with complex partial seizures, not intractable,

without status epilepticus


SNOMED:  007835024


(10) Pulmonary embolism


ICD Codes:  I26.99 - Pulmonary embolism


SNOMED:  05000314


(11) ACS (acute coronary syndrome)


ICD Codes:  I20.0 - Unstable angina


SNOMED:  809530381


(12) Multiple injuries due to trauma


ICD Codes:  T07 - Unspecified multiple injuries


SNOMED:  976377272











Ricardo Pineda                Jan 29, 2021 14:47

## 2021-01-29 NOTE — NUR
CASE MANAGEMENT:REVIEW



70 YR OLD MALE PRESENTED TO ER



CC: WORSENING WEAKNESS



SI: WEAKNESS. UTI

98.1   88  20  125/66   96% ON RA

BUN+20



IS: PRIMIDONE PO

DILANTIN PO

IV ROCEPHIN 

IVF@75/HR

URINE CX

ABD US

**: TO  MED/SURG [facility]

DCP: RETURN TO PRIOR LIVING ARRANGEMENTS

## 2021-01-29 NOTE — NUR
NURSE HAND-OFF: 



Important Events on Shift:[ADMISSION, AMS DIAGNOSIS/PATIENT RESPONDED ADEQUATELY TO SIMPLE 
QUESTIONS, ORIENTED TO PERSON/PLACE/YEAR/PURPOSE ]

Patient Status: [RESTING COMFORTABLY, NO COMPLAINTS OF PAIN THROUGHOUT THE NIGHT. NAD.]

Diet: [REGULAR, MECHANICAL SOFT CHOPPED]



Pending Orders: [AM LABS]

Pending Results/Labs:[]

Pending MD notification:[]



Latest Vital Signs: Temperature 97.9 , Pulse 81 , B/P 129 /69 , Respiratory Rate 18 , O2 SAT 
99 , Room Air, O2 Flow Rate .  

Vital Sign Comment: [STABLE, AFEBRILE]



Latest Copeland Fall Score: 60  

Fall Risk: High Risk 

Safety Measures: Call light Within Reach, Bed Alarm Zone 2, Side Rails Side Rails x3, Bed 
position Low and Locked.

Fall Precautions: 

Yellow Socks

Yellow Gown

Door Sign

Patient Fall Education



Report given to [PACHECO,RN].

## 2021-01-29 NOTE — HISTORY & PHYSICAL
History and Physical


History & Physicial


Job # [id number]











Aron Padron MD                 Jan 29, 2021 00:05

## 2021-01-29 NOTE — NUR
NURSE NOTES:

 called back about the result. Doctor Chance is aware and she will assess him tomorrow 
morning. No new orders.

## 2021-01-29 NOTE — NUR
NURSE NOTES:

Patient's PCR result is negative. Called  to inform and put him off the isolation. 
Left a message. Will wait for call back.

## 2021-01-29 NOTE — NUR
NURSE NOTES:

patient admitted under Dr. Marie but per RICH Leiva Dr is not primary anymore, 
only Pulmonary consult. Notified  and received order change primary to Vito Mariano 
from Dr. Marie. order noted and carried out.

## 2021-01-29 NOTE — NUR
NURSE HAND-OFF: 



Important Events on Shift: IV hydration. IV ATB.

Patient Status: stable. confused and forgetful 

Diet: regular. mechsoft chopped 



Pending Orders: n/a

Pending Results/Labs:n/a

Pending MD notification:n/a



Latest Vital Signs: Temperature 98.5 , Pulse 82 , B/P 121 /73 , Respiratory Rate 18 , O2 SAT 
94 , Room Air, O2 Flow Rate .  

Vital Sign Comment: stable



Latest Copeland Fall Score: 95  

Fall Risk: High Risk 

Safety Measures: Call light Within Reach, Bed Alarm Zone 1, Side Rails Side Rails x3, Bed 
position Low and Locked.

Fall Precautions: 

Yellow Socks

Yellow Gown

Door Sign



Report given to TERESITA Slaughter.

## 2021-01-29 NOTE — NUR
NURSE NOTES:

received report from TE nichols. patient in bed. alert to person and place. disoriented to 
time and purpose. verbally responsive. forgetful and confused. no respiratory distress on 
room air. mild discomfort on RUQ and lateral. IV on LAC 20g running D5 1/2ns 20kcl@ 75/hr. 
using urinal and occasionally incontinent. tolerated regular food. consumed 90% of 
breakfast. high risk fall d/t multiple hs of fall at home. bed in the lowest position and 
locked. call light within reach. alarm on. side rails are up and padded for seizure 
precaution.

## 2021-01-29 NOTE — NUR
NURSE NOTES:

Received report from Irma LO. Patient is awake, alert and oriented to self and location. On 
room air, breathing is even and unlabored. No complains of pain or distress noted. IV left 
AC intact and patent with no bleeding noted. IVF running as ordered. Bed low and locked. 
Call light within reach.

## 2021-01-29 NOTE — CONSULTATION
Consult Note


Consult Note


DATE OF CONSULTATION: 1/29/2021


CONSULTING PHYSICIAN:  Victor Hugo Marie MD.





ATTENDING PHYSICIAN: Dr. Padron





REASON FOR CONSULTATION: History of COPD





HISTORY OF PRESENT ILLNESS: This is a 70-year-old male with past medical history

of COPD, CVA, and seizure disorder on Dilantin, who was brought in by EMS from 

home for generalized weakness.  EMS was called by the patient's physical 

therapist who has been helping rehab after an injury last spring.  According to 

EMS, patient was more confused than usual.  Patient initially complained of 

right upper quadrant and flank pain with history of hepatitis B and liver 

cirrhosis.  Patient denied taking any medication at home for his hepatitis B.  

He denies any chest pain, shortness of breath, or cough.  Patient reports 

feeling fine.


Chest x-ray was unremarkable. Patient tested negative for COVID-19 via PCR. CT 

of the brain demonstrated no acute intracranial pathology.  Patient's UA 

demonstrated UTI and he was started on Rocephin.  Patient was admitted to the 

hospital for further treatment and evaluation.





We will follow up urine culture showed no growth.





PAST MEDICAL HISTORY: COPD, CVA, seizure





MEDICATIONS: Acetaminophen, amitriptyline, lansoprazole, latanoprost, phenytoin,

primidone, sumatriptan, tamsulosin, tolterodine, warfarin





ALLERGIES: Iodine, shellfish derived, spinach





FAMILY HISTORY: Unknown





PERSONAL/SOCIAL HISTORY:  Patient reports living alone with a cat





REVIEW OF SYSTEMS: Negative except mentioned in HPI





PHYSICAL EXAMINATION:


VITAL SIGNS:  Blood pressure 104/71, heart rate 89, respiratory rate 19,


weight 58 kg, height 177 cm..


General: Patient laying in bed, appears NAD with normal work of breathing on 

room air


HEENT:  Head exam reveals that the head is normocephalic, atraumatic


without deformity or unusual swelling.  Pupils are PERRLA.


CHEST AND LUNGS:  Reveals clear, normal, symmetrical breath sounds with no


adventitious sounds.


CARDIOVASCULAR:  Reveals normal S1, S2 without murmurs, rubs, or clicks.


ABDOMEN:  Soft with no tenderness or organomegaly.


RECTAL:  Deferred.


MUSCULOSKELETAL:  There is no tenderness to palpation.  Range of motion is


normal.


NEUROLOGICAL:  Alert and oriented x3 , nonfocal





LABORATORY DATA:  


Laboratory testing WBC 6, RBC 4.12, hemoglobin 14.2, hematocrit 40.6





Chemistries show alk phos 229, albumin 3.3, otherwise unremarkable


Assessment/Plan


1. History of COPD


   -Patient denies using home oxygen


   -Currently saturating well on room air


   -Monitor for hypoxia and provide supplemental oxygen as needed


2. COVID-19 PCR negative





3. History of hepatitis B


   -Per patient, he does not have it anymore


   -Abdominal US shows coarse hepatic echotexture.  No cirrhosis or space-

occupying lesion seen.


   - ID following


4. UTI


   -Preliminary urine culture did not show growth


   -On Rocephin for possible UTI per ID


   - ID following





The care for this patient was discussed with my supervising physician.


Time spent for this case was approximately 31 minutes.











Teo Jay                 Jan 29, 2021 14:36

## 2021-01-29 NOTE — NUR
***INSURANCE***



CLINICALS/REVIEW FAXED TO [facility]

  [phone] 

TELE [phone]



[facility]

 [phone]  and [phone]

TELE [phone]

## 2021-01-29 NOTE — NUR
RD ASSESSMENT & RECOMMENDATIONS

SEE CARE ACTIVITY FOR COMPLETE ASSESSMENT



DAILY ESTIMATED NEEDS:

Needs based on Pulmonary 58kg  

25-35  kcals/kg 

3603-0430  total kcals

1-1.5  g protein/kg

58-87  g total protein 

25-30  mL/kg

0377-1471  total fluid mLs



NUTRITION DIAGNOSIS:

Increased kcal needs r/t Low BMI as evidenced by BMI 18.4, underweight per

guidelines 77% of Ideal Body Weight.



CURRENT DIET: Regular ms chopped    



PO DIET RECOMMENDATIONS:

With good po intake >75%, rec Low Na diet/ texture as tolerated  





ADDITIONAL RECOMMENDATIONS:

1) Monitor po intake, rec SLP eval  

2) Obtain a standing weight as able or calibrated bed scale  

3) Check lytes daily, replete as needed  

4) Add Ensure Enlive BID  

. 

.

## 2021-01-29 NOTE — INTERNAL MED PROGRESS NOTE
Subjective


Physician Name


Aron Padron


Attending Physician


Aron Padron MD





Current Medications








 Medications


  (Trade)  Dose


 Ordered  Sig/Eddie


 Route


 PRN Reason  Start Time


 Stop Time Status Last Admin


Dose Admin


 


 Acetaminophen


  (Tylenol)  325 mg  Q4H  PRN


 ORAL


 For Pain  1/29/21 00:30


 2/28/21 00:29   





 


 Ceftriaxone


 Sodium 1 gm/


 Dextrose  55 ml @ 


 110 mls/hr  Q24H


 IVPB


   1/29/21 18:00


 2/5/21 17:59  1/29/21 18:11





 


 Dextrose/


 Electrolytes  1,000 ml @ 


 75 mls/hr  [id number]


 IV


   1/29/21 02:00


 2/28/21 01:59  1/29/21 15:49





 


 Latanoprost


  (Xalatan)  1 drop  BEDTIME


 BOTH EYES


   1/29/21 21:00


 2/28/21 20:59   





 


 Pantoprazole


  (Protonix)  40 mg  DAILY


 ORAL


   1/29/21 09:00


 2/28/21 08:59  1/29/21 08:26





 


 Phenytoin


  (Dilantin)  300 mg  BEDTIME


 ORAL


   1/29/21 21:00


 2/28/21 20:59   





 


 Primidone


  (Mysoline)  250 mg  TID


 ORAL


   1/29/21 09:00


 2/28/21 08:59  1/29/21 18:11





 


 Sumatriptan


 Succinate


  (Imitrex)  100 mg  TWICE A DAY  PRN


 ORAL


 For Headache  1/29/21 00:15


 2/28/21 00:14   





 


 Tamsulosin HCl


  (Flomax)  0.4 mg  BEDTIME


 ORAL


   1/29/21 01:00


 2/28/21 00:59  1/29/21 01:59











Allergies:  


Coded Allergies:  


     SHELLFISH DERIVED (Unverified  Allergy, Intermediate, ITCHING/HIVES, 

7/25/14)


     IODINE (Unverified  Allergy, Unknown, 7/25/14)


Uncoded Allergies:  


     SPINACH (Allergy, Intermediate, SEVERE ITCHING/HIVES, 7/25/14)


Subjective


Awake, more alert, responsive, denies any chest pain or shortness of breath, 

complement lower extremity weakness.





Objective





Last Vital Signs








  Date Time  Temp Pulse Resp B/P (MAP) Pulse Ox O2 Delivery O2 Flow Rate FiO2


 


1/29/21 16:00 98.5 82 18 121/73 (89) 94   


 


1/29/21 09:00      Room Air  


 


1/28/21 21:56        100











Laboratory Tests








Test


 1/29/21


04:45 1/29/21


11:10


 


White Blood Count


 6.0 K/UL


(4.8-10.8) 





 


Red Blood Count


 4.12 M/UL


(4.70-6.10)  L 





 


Hemoglobin


 14.2 G/DL


(14.2-18.0) 





 


Hematocrit


 40.6 %


(42.0-52.0)  L 





 


Mean Corpuscular Volume 98 FL (80-99)   


 


Mean Corpuscular Hemoglobin


 34.5 PG


(27.0-31.0)  H 





 


Mean Corpuscular Hemoglobin


Concent 35.0 G/DL


(32.0-36.0) 





 


Red Cell Distribution Width


 16.6 %


(11.6-14.8)  H 





 


Platelet Count


 228 K/UL


(150-450) 





 


Mean Platelet Volume


 6.9 FL


(6.5-10.1) 





 


Neutrophils (%) (Auto)


 68.1 %


(45.0-75.0) 





 


Lymphocytes (%) (Auto)


 21.9 %


(20.0-45.0) 





 


Monocytes (%) (Auto)


 8.2 %


(1.0-10.0) 





 


Eosinophils (%) (Auto)


 0.7 %


(0.0-3.0) 





 


Basophils (%) (Auto)


 1.2 %


(0.0-2.0) 





 


Sodium Level


 145 MMOL/L


(136-145) 





 


Potassium Level


 4.0 MMOL/L


(3.5-5.1) 





 


Chloride Level


 107 MMOL/L


() 





 


Carbon Dioxide Level


 29 MMOL/L


(21-32) 





 


Anion Gap


 9 mmol/L


(5-15) 





 


Blood Urea Nitrogen


 17 mg/dL


(7-18) 





 


Creatinine


 1.0 MG/DL


(0.55-1.30) 





 


Estimat Glomerular Filtration


Rate > 60 mL/min


(>60) 





 


Glucose Level


 106 MG/DL


() 





 


Calcium Level


 10.0 MG/DL


(8.5-10.1) 





 


Phosphorus Level


 3.0 MG/DL


(2.5-4.9) 





 


Magnesium Level


 2.1 MG/DL


(1.8-2.4) 





 


Total Bilirubin


 0.3 MG/DL


(0.2-1.0) 





 


Aspartate Amino Transf


(AST/SGOT) 27 U/L (15-37)


 





 


Alanine Aminotransferase


(ALT/SGPT) 20 U/L (12-78)


 





 


Alkaline Phosphatase


 229 U/L


()  H 





 


Total Protein


 7.7 G/DL


(6.4-8.2) 





 


Albumin


 3.3 G/DL


(3.4-5.0)  L 





 


Globulin 4.4 g/dL   


 


Albumin/Globulin Ratio


 0.8 (1.0-2.7)


L 





 


Hepatitis B Surface Antigen  Pending  


 


Hepatitis B Surface Antibody,


Quant 


 Pending  





 


Hepatitis B Core Total


Antibody 


 Pending  





 


Hepatitis B DNA (IU/mL)  Pending  


 


Hepatitis C Antibody  Pending  


 


HIV (1&2) Antibody Rapid  Pending  











Microbiology








 Date/Time


Source Procedure


Growth Status





 


 1/28/21 17:20


Urine,Clean Catch Urine Culture - Preliminary


NO GROWTH Resulted


 


 1/28/21 16:35


Nasopharynx Coronavirus COVID-19 PCR ([facility]) - Final Complete

















Intake and Output  


 


 1/28/21 1/29/21





 19:00 07:00


 


Intake Total  795 ml


 


Output Total 30 ml 500 ml


 


Balance -30 ml 295 ml


 


  


 


Intake Oral  420 ml


 


IV Total  375 ml


 


Output Urine Total 30 ml 500 ml


 


# Voids 1 








Objective


General: No acute distress, awake and alert


HEENT: NCAT, sclera anicteric, PERRL, EOMI.


Neck: Supple, no significant jugular venous distention, 


Lungs: fair inspiratory effort, decreased air in the bases, no Wheeze or Rales.


Heart: Regular rate and rhythm, normal S1/S2, no murmurs.


Abdomen: soft, nontender, nondistended. Normoactive bowel sounds.


 / Rectal: Refused and deferred.


Extremities: No Cyanosis , clubbing or edema. 


Neuro: A&O x 3, Able to move all extremities, bilateral lower extremity weakness


Skin: warm, no rash.


Psych: Normal mood and affect





Assessment/Plan


Assessment/Plan


altered mental status most likely secondary to toxic metabolic encephalopathy as

a result of urine tract infection and dehydration.


Sepsis secondary to urinary tract infection.


Dehydration.


Acute kidney injury.


Seizure disorder.


COPD.


History of hepatitis B+


BPH








Plan:


Follow-up with laboratory as well as culture in the morning


Antibiotics: Rocephin IV


CODE STATUS full code.


DVT prophylaxis heparin subcu.


Follow-up with infection disease recommendation.


PT mobility consultation.











Aron Padron MD                 Jan 29, 2021 18:55

## 2021-01-29 NOTE — CONSULTATION
History of Present Illness


General


Date patient seen:  Jan 29, 2021


Time patient seen:  12:10


Chief Complaint:  Generalized Weakness


Referring physician:  Dr. Padron


Reason for Consultation:  R/o infection





Present Illness


HPI





71yo M w/ PMH of COPD, CVA, seizure d/o, who p/w generalized weakness. Was 

working w/ home PT and noted to be more confused than usual w/ increasing 

weakness, so EMS was called. ID c/s given c/f possible infection. 





In house, pt has been AF, HDS on RA. Normal WBC. UA+ but UCx NTD.


Pt c/o mild RUQ pain and flank pain x few days


In speaking with pt, he reports he was told to come in but has felt fine at 

home. Currently feels fine too. Denies any fevers/chills, NVD, abd pain, CP, 

SOB, cough. No dysuria.


Lives alone with his cat, did have a caretaker recently at home.


Says he used to have hepatitis B, but not anymore. Denies being on any meds for 

HepB





PMH:


COPD


CVA


Seizure d/o


HBV c/b cirrhosis


Allergies:  


Coded Allergies:  


     SHELLFISH DERIVED (Unverified  Allergy, Intermediate, ITCHING/HIVES, 

7/25/14)


     IODINE (Unverified  Allergy, Unknown, 7/25/14)


Uncoded Allergies:  


     SPINACH (Allergy, Intermediate, SEVERE ITCHING/HIVES, 7/25/14)





Medication History


Scheduled


Acetaminophen* (Acetaminophen 325MG Tablet*), 325 MG ORAL Q4H, (Reported)


Amitriptyline Hcl* (Amitriptyline Hcl*), 75 MG ORAL BEDTIME, (Reported)


Lansoprazole* (Lansoprazole*), 30 MG ORAL DAILY, (Reported)


Latanoprost* (Xalatan*), 1 DROP BOTH EYES BEDTIME, (Reported)


Phenytoin Sodium Extended* (Dilantin*), 100 MG ORAL THREE TIMES A DAY, 

(Reported)


Phenytoin Sodium Extended* (Dilantin*), 100 MG ORAL THREE TIMES A DAY, 

(Reported)


Primidone* (Mysoline*), 250 MG ORAL THREE TIMES A DAY


Tamsulosin Hcl (Tamsulosin Hcl*), 0.4 MG ORAL BEDTIME, (Reported)


Tolterodine Tartrate (Tolterodine Tartrate), 4 MG PO DAILY, (Reported)


Warfarin Sod* (Warfarin Sod*), 7.5 MG ORAL DAILY, (Reported)





Scheduled PRN


Amitriptyline HCl (Amitriptyline HCl), 10 MG ORAL BEDTIME PRN for For Headache, 

(Reported)


Sumatriptan Succinate (Sumatriptan Succinate), 100 MG PO for For Headache, 

(Reported)


Sumatriptan Succinate (Sumatriptan Succinate), 100 MG PO TWICE A DAY PRN for For

Headache, (Reported)





Patient History


Healthcare decision maker





Resuscitation status





Advanced Directive on File








Review of Systems


ROS Narrative


10-point neg except as noted in HPI





Physical Exam


Physical Exam Narrative


Gen: NAD


HEENT: NCAT


Pulm: BL chest rise on RA, non-labored


Abd: Thin, soft, NTND


Ext: No c/c/e


Skin: No visible rashes


Neuro: Awake, A&O to place, location and year, moving all extremities 

spontaneously





Last 24 Hour Vital Signs








  Date Time  Temp Pulse Resp B/P (MAP) Pulse Ox O2 Delivery O2 Flow Rate FiO2


 


1/29/21 08:00 98.4 88 17 146/81 (102) 96   


 


1/29/21 04:00 97.9 81 18 129/69 (89) 99   


 


1/29/21 00:00 98.7 72 20 120/74 (89) 97   


 


1/28/21 22:31      Room Air  


 


1/28/21 22:15 97.6 77 20 124/70 (88) 99   


 


1/28/21 21:56 98.1 79 20 122/71 25 Room Air  100


 


1/28/21 16:36  79 20 122/71 25 Room Air  


 


1/28/21 15:28  75 20   Room Air  100


 


1/28/21 15:27  75 20 121/72 97 Room Air  


 


1/28/21 14:30 98.1 88 20 125/66 (85) 96 Room Air  

















Intake and Output  


 


 1/28/21 1/29/21





 19:00 07:00


 


Intake Total  720 ml


 


Output Total 30 ml 500 ml


 


Balance -30 ml 220 ml


 


  


 


Intake Oral  420 ml


 


IV Total  300 ml


 


Output Urine Total 30 ml 500 ml


 


# Voids 1 











Laboratory Tests








Test


 1/28/21


15:20 1/28/21


16:30 1/28/21


17:20 1/29/21


04:45


 


White Blood Count


 5.1 K/UL


(4.8-10.8) 


 


 6.0 K/UL


(4.8-10.8)


 


Red Blood Count


 4.18 M/UL


(4.70-6.10)  L 


 


 4.12 M/UL


(4.70-6.10)  L


 


Hemoglobin


 14.2 G/DL


(14.2-18.0) 


 


 14.2 G/DL


(14.2-18.0)


 


Hematocrit


 40.3 %


(42.0-52.0)  L 


 


 40.6 %


(42.0-52.0)  L


 


Mean Corpuscular Volume 96 FL (80-99)     98 FL (80-99)  


 


Mean Corpuscular Hemoglobin


 34.0 PG


(27.0-31.0)  H 


 


 34.5 PG


(27.0-31.0)  H


 


Mean Corpuscular Hemoglobin


Concent 35.3 G/DL


(32.0-36.0) 


 


 35.0 G/DL


(32.0-36.0)


 


Red Cell Distribution Width


 16.8 %


(11.6-14.8)  H 


 


 16.6 %


(11.6-14.8)  H


 


Platelet Count


 216 K/UL


(150-450) 


 


 228 K/UL


(150-450)


 


Mean Platelet Volume


 6.9 FL


(6.5-10.1) 


 


 6.9 FL


(6.5-10.1)


 


Neutrophils (%) (Auto)


 63.4 %


(45.0-75.0) 


 


 68.1 %


(45.0-75.0)


 


Lymphocytes (%) (Auto)


 25.7 %


(20.0-45.0) 


 


 21.9 %


(20.0-45.0)


 


Monocytes (%) (Auto)


 9.9 %


(1.0-10.0) 


 


 8.2 %


(1.0-10.0)


 


Eosinophils (%) (Auto)


 0.4 %


(0.0-3.0) 


 


 0.7 %


(0.0-3.0)


 


Basophils (%) (Auto)


 0.6 %


(0.0-2.0) 


 


 1.2 %


(0.0-2.0)


 


Prothrombin Time


 11.8 SEC


(9.30-11.50)  H 


 


 





 


Prothromb Time International


Ratio 1.1 (0.9-1.1)  


 


 


 





 


Activated Partial


Thromboplast Time 28 SEC (23-33)


 


 


 





 


Lactic Acid Level


 1.80 mmol/L


(0.4-2.0) 


 


 





 


Ammonia


 < 10 umol/L


(11-32)  L 


 


 





 


Troponin I


 0.000 ng/mL


(0.000-0.056) 


 


 





 


Sodium Level


 


 146 MMOL/L


(136-145)  H 


 145 MMOL/L


(136-145)


 


Potassium Level


 


 3.8 MMOL/L


(3.5-5.1) 


 4.0 MMOL/L


(3.5-5.1)


 


Chloride Level


 


 109 MMOL/L


()  H 


 107 MMOL/L


()


 


Carbon Dioxide Level


 


 32 MMOL/L


(21-32) 


 29 MMOL/L


(21-32)


 


Anion Gap


 


 5 mmol/L


(5-15) 


 9 mmol/L


(5-15)


 


Blood Urea Nitrogen


 


 20 mg/dL


(7-18)  H 


 17 mg/dL


(7-18)


 


Creatinine


 


 0.9 MG/DL


(0.55-1.30) 


 1.0 MG/DL


(0.55-1.30)


 


Estimat Glomerular Filtration


Rate 


 > 60 mL/min


(>60) 


 > 60 mL/min


(>60)


 


Glucose Level


 


 95 MG/DL


() 


 106 MG/DL


()


 


Calcium Level


 


 9.8 MG/DL


(8.5-10.1) 


 10.0 MG/DL


(8.5-10.1)


 


Magnesium Level


 


 2.2 MG/DL


(1.8-2.4) 


 2.1 MG/DL


(1.8-2.4)


 


Total Bilirubin


 


 0.3 MG/DL


(0.2-1.0) 


 0.3 MG/DL


(0.2-1.0)


 


Aspartate Amino Transf


(AST/SGOT) 


 25 U/L (15-37)


 


 27 U/L (15-37)





 


Alanine Aminotransferase


(ALT/SGPT) 


 24 U/L (12-78)


 


 20 U/L (12-78)





 


Alkaline Phosphatase


 


 206 U/L


()  H 


 229 U/L


()  H


 


Total Creatine Kinase


 


 207 U/L


() 


 





 


Pro-B-Type Natriuretic Peptide


 


 95 pg/mL


(0-125) 


 





 


Total Protein


 


 7.0 G/DL


(6.4-8.2) 


 7.7 G/DL


(6.4-8.2)


 


Albumin


 


 3.0 G/DL


(3.4-5.0)  L 


 3.3 G/DL


(3.4-5.0)  L


 


Globulin  4.0 g/dL    4.4 g/dL  


 


Albumin/Globulin Ratio


 


 0.8 (1.0-2.7)


L 


 0.8 (1.0-2.7)


L


 


Urine Color   Yellow   


 


Urine Appearance   Very cloudy   


 


Urine pH   5 (4.5-8.0)   


 


Urine Specific Gravity


 


 


 1.020


(1.005-1.035) 





 


Urine Protein


 


 


 4+ (NEGATIVE)


H 





 


Urine Glucose (UA)


 


 


 Negative


(NEGATIVE) 





 


Urine Ketones


 


 


 2+ (NEGATIVE)


H 





 


Urine Blood


 


 


 3+ (NEGATIVE)


H 





 


Urine Nitrite


 


 


 Negative


(NEGATIVE) 





 


Urine Bilirubin


 


 


 Negative


(NEGATIVE) 





 


Urine Urobilinogen


 


 


 1 MG/DL


(0.0-1.0)  H 





 


Urine Leukocyte Esterase


 


 


 3+ (NEGATIVE)


H 





 


Urine RBC


 


 


 0-2 /HPF (0 -


0)  H 





 


Urine WBC


 


 


 Tntc /HPF (0 -


0)  H 





 


Urine Squamous Epithelial


Cells 


 


 None /LPF


(NONE/OCC) 





 


Urine Bacteria


 


 


 Many /HPF


(NONE)  H 





 


Phosphorus Level


 


 


 


 3.0 MG/DL


(2.5-4.9)











Microbiology








 Date/Time


Source Procedure


Growth Status





 


 1/28/21 17:20


Urine,Clean Catch Urine Culture - Preliminary


NO GROWTH Resulted








Height (Feet):  5


Height (Inches):  10.00


Weight (Pounds):  128


Medications





Current Medications








 Medications


  (Trade)  Dose


 Ordered  Sig/Eddie


 Route


 PRN Reason  Start Time


 Stop Time Status Last Admin


Dose Admin


 


 Acetaminophen


  (Tylenol)  325 mg  Q4H  PRN


 ORAL


 For Pain  1/29/21 00:30


 2/28/21 00:29   





 


 Ceftriaxone


 Sodium 1 gm/


 Dextrose  55 ml @ 


 110 mls/hr  Q24H


 IVPB


   1/29/21 18:00


 2/5/21 17:59   





 


 Dextrose/


 Electrolytes  1,000 ml @ 


 75 mls/hr  [id number]


 IV


   1/29/21 02:00


 2/28/21 01:59  1/29/21 02:00





 


 Latanoprost


  (Xalatan)  1 drop  BEDTIME


 BOTH EYES


   1/29/21 21:00


 2/28/21 20:59   





 


 Pantoprazole


  (Protonix)  40 mg  DAILY


 ORAL


   1/29/21 09:00


 2/28/21 08:59  1/29/21 08:26





 


 Phenytoin


  (Dilantin)  300 mg  BEDTIME


 ORAL


   1/29/21 21:00


 2/28/21 20:59   





 


 Primidone


  (Mysoline)  250 mg  TID


 ORAL


   1/29/21 09:00


 2/28/21 08:59  1/29/21 08:26





 


 Sumatriptan


 Succinate


  (Imitrex)  100 mg  TWICE A DAY  PRN


 ORAL


 For Headache  1/29/21 00:15


 2/28/21 00:14   





 


 Tamsulosin HCl


  (Flomax)  0.4 mg  BEDTIME


 ORAL


   1/29/21 01:00


 2/28/21 00:59  1/29/21 01:59














Assessment/Plan


Assessment/Plan:





71yo M with:





Afebrile


Normal WBC


Satting well on RA


R/o UTI


R/o infection


Weakness, failure to thrive


   1/28 BCx p


   COVID PCR p


   UA+, UCx NTD


   CXR: No acute process


   CTH: Neg for acute process





H/o HBV - per pt doesn't have it anymore


Cirrhosis


Elevated alk phos to 200s


   Abd US: COARSE HEPATIC ECHOTEXTURE WHICH MAY BE RELATED THE PATIENT'S HISTORY

OF HEPATITIS B. NO RAFFAELE CIRRHOSIS OR SPACE-OCCUPYING LESION SEEN PRESENTLY.





PMH:


COPD


CVA


Seizure d/o


HBV c/b cirrhosis





Plan:


Cont CTX 1g IV daily #2 for possible UTI





HIV screen


HBV Viral load, HBV serologies


HCV Ab screen





F/u COVID PCR


F/u BCx, UCx





Monitor CBC/CMP


Monitor temp curve, hemodynamics


Monitor resp status





D/w RN and Pulm RICH Jay





Thank you for this consult. [facility] will continue to follow.











Corrie Fletcher M.D.               Jan 29, 2021 09:51

## 2021-01-29 NOTE — NUR
NURSE NOTES:

given doctor's order of switch primary physician to Dr.Shadi Sharp to [facility], Admitting 
Office.  He will change the system.

## 2021-01-30 VITALS — SYSTOLIC BLOOD PRESSURE: 117 MMHG | DIASTOLIC BLOOD PRESSURE: 67 MMHG

## 2021-01-30 VITALS — SYSTOLIC BLOOD PRESSURE: 127 MMHG | DIASTOLIC BLOOD PRESSURE: 85 MMHG

## 2021-01-30 VITALS — DIASTOLIC BLOOD PRESSURE: 72 MMHG | SYSTOLIC BLOOD PRESSURE: 136 MMHG

## 2021-01-30 VITALS — DIASTOLIC BLOOD PRESSURE: 77 MMHG | SYSTOLIC BLOOD PRESSURE: 138 MMHG

## 2021-01-30 VITALS — DIASTOLIC BLOOD PRESSURE: 68 MMHG | SYSTOLIC BLOOD PRESSURE: 124 MMHG

## 2021-01-30 VITALS — SYSTOLIC BLOOD PRESSURE: 117 MMHG | DIASTOLIC BLOOD PRESSURE: 73 MMHG

## 2021-01-30 VITALS — DIASTOLIC BLOOD PRESSURE: 73 MMHG | SYSTOLIC BLOOD PRESSURE: 121 MMHG

## 2021-01-30 LAB
ADD MANUAL DIFF: NO
ALBUMIN SERPL-MCNC: 3.1 G/DL (ref 3.4–5)
ALBUMIN/GLOB SERPL: 0.7 {RATIO} (ref 1–2.7)
ALP SERPL-CCNC: 229 U/L (ref 46–116)
ALT SERPL-CCNC: 29 U/L (ref 12–78)
AMYLASE SERPL-CCNC: 33 U/L (ref 25–115)
ANION GAP SERPL CALC-SCNC: 8 MMOL/L (ref 5–15)
APTT BLD: 30 SEC (ref 23–33)
AST SERPL-CCNC: 37 U/L (ref 15–37)
BASOPHILS NFR BLD AUTO: 0.6 % (ref 0–2)
BILIRUB SERPL-MCNC: 0.6 MG/DL (ref 0.2–1)
BUN SERPL-MCNC: 9 MG/DL (ref 7–18)
CALCIUM SERPL-MCNC: 9.4 MG/DL (ref 8.5–10.1)
CHLORIDE SERPL-SCNC: 103 MMOL/L (ref 98–107)
CO2 SERPL-SCNC: 29 MMOL/L (ref 21–32)
CREAT SERPL-MCNC: 0.8 MG/DL (ref 0.55–1.3)
EOSINOPHIL NFR BLD AUTO: 0.9 % (ref 0–3)
ERYTHROCYTE [DISTWIDTH] IN BLOOD BY AUTOMATED COUNT: 15.4 % (ref 11.6–14.8)
GLOBULIN SER-MCNC: 4.4 G/DL
HCT VFR BLD CALC: 39.5 % (ref 42–52)
HGB BLD-MCNC: 14 G/DL (ref 14.2–18)
INR PPP: 1 (ref 0.9–1.1)
LYMPHOCYTES NFR BLD AUTO: 31.1 % (ref 20–45)
MCV RBC AUTO: 96 FL (ref 80–99)
MONOCYTES NFR BLD AUTO: 10.1 % (ref 1–10)
NEUTROPHILS NFR BLD AUTO: 57.2 % (ref 45–75)
PLATELET # BLD: 221 K/UL (ref 150–450)
POTASSIUM SERPL-SCNC: 3.8 MMOL/L (ref 3.5–5.1)
RBC # BLD AUTO: 4.1 M/UL (ref 4.7–6.1)
SODIUM SERPL-SCNC: 140 MMOL/L (ref 136–145)
WBC # BLD AUTO: 5.9 K/UL (ref 4.8–10.8)

## 2021-01-30 RX ADMIN — LATANOPROST SCH DROP: 50 SOLUTION OPHTHALMIC at 21:14

## 2021-01-30 RX ADMIN — PHENYTOIN SODIUM SCH MG: 100 CAPSULE, EXTENDED RELEASE ORAL at 21:14

## 2021-01-30 RX ADMIN — DEXTROSE, SODIUM CHLORIDE, AND POTASSIUM CHLORIDE SCH MLS/HR: 5; .45; .15 INJECTION INTRAVENOUS at 05:36

## 2021-01-30 RX ADMIN — DEXTROSE, SODIUM CHLORIDE, AND POTASSIUM CHLORIDE SCH MLS/HR: 5; .45; .15 INJECTION INTRAVENOUS at 17:14

## 2021-01-30 RX ADMIN — SODIUM CHLORIDE SCH MLS/HR: 0.9 INJECTION INTRAVENOUS at 17:13

## 2021-01-30 RX ADMIN — TAMSULOSIN HYDROCHLORIDE SCH MG: 0.4 CAPSULE ORAL at 21:14

## 2021-01-30 NOTE — NUR
***INSURANCE***



CLINICALS FAXED TO [facility]

  [phone] 

TELE [phone]



[facility]

 [phone]  and [phone]

TELE [phone]

## 2021-01-30 NOTE — NUR
NURSE NOTES:

Patient in bed, awake and able to make needs known. On room air with no signs of distress or 
SOB. Denies any pain at this time. IV intact and patent. Bed locked and in lowest position. 
Call light in reach. Will continue plan of care.

## 2021-01-30 NOTE — INTERNAL MED PROGRESS NOTE
Subjective


Date of Service:  Jan 30, 2021


Physician Name


WindyDominic


Attending Physician


Aron Padron MD





Current Medications








 Medications


  (Trade)  Dose


 Ordered  Sig/Eddie


 Route


 PRN Reason  Start Time


 Stop Time Status Last Admin


Dose Admin


 


 Acetaminophen


  (Tylenol)  325 mg  Q4H  PRN


 ORAL


 For Pain  1/29/21 00:30


 2/28/21 00:29   





 


 Ceftriaxone


 Sodium 1 gm/


 Dextrose  55 ml @ 


 110 mls/hr  Q24H


 IVPB


   1/29/21 18:00


 2/5/21 17:59  1/29/21 18:11





 


 Dextrose/


 Electrolytes  1,000 ml @ 


 75 mls/hr  [id number]


 IV


   1/29/21 02:00


 2/28/21 01:59  1/30/21 05:36





 


 Latanoprost


  (Xalatan)  1 drop  BEDTIME


 BOTH EYES


   1/29/21 21:00


 2/28/21 20:59  1/29/21 20:18





 


 Pantoprazole


  (Protonix)  40 mg  DAILY


 ORAL


   1/29/21 09:00


 2/28/21 08:59  1/30/21 09:43





 


 Phenytoin


  (Dilantin)  300 mg  BEDTIME


 ORAL


   1/29/21 21:00


 2/28/21 20:59  1/29/21 20:17





 


 Primidone


  (Mysoline)  250 mg  TID


 ORAL


   1/29/21 09:00


 2/28/21 08:59  1/30/21 12:18





 


 Sumatriptan


 Succinate


  (Imitrex)  100 mg  TWICE A DAY  PRN


 ORAL


 For Headache  1/29/21 00:15


 2/28/21 00:14   





 


 Tamsulosin HCl


  (Flomax)  0.4 mg  BEDTIME


 ORAL


   1/29/21 01:00


 2/28/21 00:59  1/29/21 20:17











Allergies:  


Coded Allergies:  


     SHELLFISH DERIVED (Unverified  Allergy, Intermediate, ITCHING/HIVES, 

7/25/14)


     IODINE (Unverified  Allergy, Unknown, 7/25/14)


Uncoded Allergies:  


     SPINACH (Allergy, Intermediate, SEVERE ITCHING/HIVES, 7/25/14)


ROS Limited/Unobtainable:  Yes


Subjective


71 YO M admitted with altered mental status.  Cover for Int [facility] - Dr Padron





Objective





Last Vital Signs








  Date Time  Temp Pulse Resp B/P (MAP) Pulse Ox O2 Delivery O2 Flow Rate FiO2


 


1/30/21 16:00 97.8 75 20 127/85 (99) 95   


 


1/30/21 09:00      Room Air  


 


1/28/21 21:56        100











Laboratory Tests








Test


 1/30/21


05:35


 


White Blood Count


 5.9 K/UL


(4.8-10.8)


 


Red Blood Count


 4.10 M/UL


(4.70-6.10)  L


 


Hemoglobin


 14.0 G/DL


(14.2-18.0)  L


 


Hematocrit


 39.5 %


(42.0-52.0)  L


 


Mean Corpuscular Volume 96 FL (80-99)  


 


Mean Corpuscular Hemoglobin


 34.0 PG


(27.0-31.0)  H


 


Mean Corpuscular Hemoglobin


Concent 35.3 G/DL


(32.0-36.0)


 


Red Cell Distribution Width


 15.4 %


(11.6-14.8)  H


 


Platelet Count


 221 K/UL


(150-450)


 


Mean Platelet Volume


 7.2 FL


(6.5-10.1)


 


Neutrophils (%) (Auto)


 57.2 %


(45.0-75.0)


 


Lymphocytes (%) (Auto)


 31.1 %


(20.0-45.0)


 


Monocytes (%) (Auto)


 10.1 %


(1.0-10.0)  H


 


Eosinophils (%) (Auto)


 0.9 %


(0.0-3.0)


 


Basophils (%) (Auto)


 0.6 %


(0.0-2.0)


 


Erythrocyte Sedimentation Rate


 26 MM/HR


(0-20)  H


 


Prothrombin Time


 11.5 SEC


(9.30-11.50)


 


Prothromb Time International


Ratio 1.0 (0.9-1.1)  





 


Activated Partial


Thromboplast Time 30 SEC (23-33)





 


Sodium Level


 140 MMOL/L


(136-145)


 


Potassium Level


 3.8 MMOL/L


(3.5-5.1)


 


Chloride Level


 103 MMOL/L


()


 


Carbon Dioxide Level


 29 MMOL/L


(21-32)


 


Anion Gap


 8 mmol/L


(5-15)


 


Blood Urea Nitrogen


 9 mg/dL (7-18)





 


Creatinine


 0.8 MG/DL


(0.55-1.30)


 


Estimat Glomerular Filtration


Rate > 60 mL/min


(>60)


 


Glucose Level


 90 MG/DL


()


 


Calcium Level


 9.4 MG/DL


(8.5-10.1)


 


Total Bilirubin


 0.6 MG/DL


(0.2-1.0)


 


Aspartate Amino Transf


(AST/SGOT) 37 U/L (15-37)





 


Alanine Aminotransferase


(ALT/SGPT) 29 U/L (12-78)





 


Alkaline Phosphatase


 229 U/L


()  H


 


C-Reactive Protein,


Quantitative 9.2 mg/dL


(0.00-0.90)  H


 


Total Protein


 7.5 G/DL


(6.4-8.2)


 


Albumin


 3.1 G/DL


(3.4-5.0)  L


 


Globulin 4.4 g/dL  


 


Albumin/Globulin Ratio


 0.7 (1.0-2.7)


L


 


Prealbumin Pending  


 


Amylase Level


 33 U/L


()


 


Lipase


 128 U/L


()











Microbiology








 Date/Time


Source Procedure


Growth Status





 


 1/28/21 17:20


Urine,Clean Catch Urine Culture - Preliminary


Gram Positive Cocci Resulted


 


 1/28/21 16:35


Nasopharynx Coronavirus COVID-19 PCR ([facility]) - Final Complete


 


 1/28/21 15:20


Blood Blood Culture - Preliminary


NO GROWTH AFTER 24 HOURS Resulted

















Intake and Output  


 


 1/29/21 1/30/21





 19:00 07:00


 


Intake Total 1660 ml 1065 ml


 


Balance 1660 ml 1065 ml


 


  


 


Intake Oral 800 ml 240 ml


 


IV Total 860 ml 825 ml


 


# Voids 13 4








Objective


Objective


General: No acute distress, awake and alert


HEENT: NCAT, sclera anicteric, PERRL, EOMI.


Neck: Supple, no significant jugular venous distention, 


Lungs: fair inspiratory effort, decreased air in the bases, no Wheeze or Rales.


Heart: Regular rate and rhythm, normal S1/S2, no murmurs.


Abdomen: soft, nontender, nondistended. Normoactive bowel sounds.


 / Rectal: Refused and deferred.


Extremities: No Cyanosis , clubbing or edema. 


Neuro: A&O x 3, Able to move all extremities, bilateral lower extremity weakness


Skin: warm, no rash.


Psych: Normal mood and affect





Assessment/Plan


altered mental status most likely secondary to toxic metabolic encephalopathy as

a result of urine tract infection and dehydration.


Sepsis secondary to urinary tract infection.


Dehydration.


Acute kidney injury.


Seizure disorder.


COPD.


History of hepatitis B+


BPH








Plan:


Follow-up with laboratory as well as culture in the morning


Antibiotics: Rocephin IV


CODE STATUS full code.


DVT prophylaxis heparin subcu.


Follow-up with infection disease recommendation.


PT mobility consultation.











Dominic Temple MD               Jan 30, 2021 16:35

## 2021-01-30 NOTE — INFECTIOUS DISEASES PROG NOTE
Assessment/Plan





69yo M with:





Afebrile


Normal WBC


Satting well on RA


UTI, although asymptomatic


R/o infection


Weakness, failure to thrive


   1/28 BCx NTD


   COVID PCR neg


   UA+, UCx +GPCs


   CXR: No acute process


   CTH: Neg for acute process





H/o HBV - per pt doesn't have it anymore


Cirrhosis


Elevated alk phos to 200s


   Abd US: COARSE HEPATIC ECHOTEXTURE WHICH MAY BE RELATED THE PATIENT'S HISTORY

OF HEPATITIS B. NO RAFFAELE CIRRHOSIS OR SPACE-OCCUPYING LESION SEEN PRESENTLY.





PMH:


COPD


CVA


Seizure d/o


HBV c/b cirrhosis





Plan:


Cont CTX 1g IV daily #3 for UTI





F/u HIV screen


F/u HBV Viral load, HBV serologies


F/u HCV Ab screen





F/u BCx, UCx +GPCs





Monitor CBC/CMP


Monitor temp curve, hemodynamics


Monitor resp status





D/w RN





Thank you for this consult. [facility] will continue to follow.





Subjective


Allergies:  


Coded Allergies:  


     SHELLFISH DERIVED (Unverified  Allergy, Intermediate, ITCHING/HIVES, 

7/25/14)


     IODINE (Unverified  Allergy, Unknown, 7/25/14)


Uncoded Allergies:  


     SPINACH (Allergy, Intermediate, SEVERE ITCHING/HIVES, 7/25/14)


AF


NAD on RA


UCx +GPCs


WBC 5.9


Feeling fine, no dysuria





Objective





Last 24 Hour Vital Signs








  Date Time  Temp Pulse Resp B/P (MAP) Pulse Ox O2 Delivery O2 Flow Rate FiO2


 


1/30/21 04:00 97.7 81 16 124/68 (86) 93   


 


1/30/21 00:00 97.9 79 16 117/73 (88) 94   


 


1/29/21 21:00      Room Air  


 


1/29/21 20:00 97.7 85 18 121/70 (87) 94   


 


1/29/21 16:00 98.5 82 18 121/73 (89) 94   


 


1/29/21 12:00 98.7 89 19 104/71 (82) 96   








Height (Feet):  5


Height (Inches):  10.00


Weight (Pounds):  128


Respiratory/Chest:  crackles/rales


Gen: NAD


HEENT: NCAT


Pulm: BL chest rise 


Abd: Non-distended


Ext: No c/c/e


Skin: No visible rashes


Neuro: Awake





Microbiology








 Date/Time


Source Procedure


Growth Status





 


 1/28/21 17:20


Urine,Clean Catch Urine Culture - Preliminary


Gram Positive Cocci Resulted


 


 1/28/21 16:35


Nasopharynx Coronavirus COVID-19 PCR ([facility]) - Final Complete


 


 1/28/21 15:20


Blood Blood Culture - Preliminary


NO GROWTH AFTER 24 HOURS Resulted











Laboratory Tests








Test


 1/29/21


11:10 1/30/21


05:35


 


Hepatitis B Surface Antigen Pending   


 


Hepatitis B Surface Antibody,


Quant Pending  


 





 


Hepatitis B Core Total


Antibody Pending  


 





 


Hepatitis B DNA (IU/mL) Pending   


 


Hepatitis C Antibody Pending   


 


HIV (1&2) Antibody Rapid Pending   


 


White Blood Count


 


 5.9 K/UL


(4.8-10.8)


 


Red Blood Count


 


 4.10 M/UL


(4.70-6.10)  L


 


Hemoglobin


 


 14.0 G/DL


(14.2-18.0)  L


 


Hematocrit


 


 39.5 %


(42.0-52.0)  L


 


Mean Corpuscular Volume  96 FL (80-99)  


 


Mean Corpuscular Hemoglobin


 


 34.0 PG


(27.0-31.0)  H


 


Mean Corpuscular Hemoglobin


Concent 


 35.3 G/DL


(32.0-36.0)


 


Red Cell Distribution Width


 


 15.4 %


(11.6-14.8)  H


 


Platelet Count


 


 221 K/UL


(150-450)


 


Mean Platelet Volume


 


 7.2 FL


(6.5-10.1)


 


Neutrophils (%) (Auto)


 


 57.2 %


(45.0-75.0)


 


Lymphocytes (%) (Auto)


 


 31.1 %


(20.0-45.0)


 


Monocytes (%) (Auto)


 


 10.1 %


(1.0-10.0)  H


 


Eosinophils (%) (Auto)


 


 0.9 %


(0.0-3.0)


 


Basophils (%) (Auto)


 


 0.6 %


(0.0-2.0)


 


Erythrocyte Sedimentation Rate


 


 26 MM/HR


(0-20)  H


 


Prothrombin Time


 


 11.5 SEC


(9.30-11.50)


 


Prothromb Time International


Ratio 


 1.0 (0.9-1.1)  





 


Activated Partial


Thromboplast Time 


 30 SEC (23-33)





 


Sodium Level


 


 140 MMOL/L


(136-145)


 


Potassium Level


 


 3.8 MMOL/L


(3.5-5.1)


 


Chloride Level


 


 103 MMOL/L


()


 


Carbon Dioxide Level


 


 29 MMOL/L


(21-32)


 


Anion Gap


 


 8 mmol/L


(5-15)


 


Blood Urea Nitrogen


 


 9 mg/dL (7-18)





 


Creatinine


 


 0.8 MG/DL


(0.55-1.30)


 


Estimat Glomerular Filtration


Rate 


 > 60 mL/min


(>60)


 


Glucose Level


 


 90 MG/DL


()


 


Calcium Level


 


 9.4 MG/DL


(8.5-10.1)


 


Total Bilirubin


 


 0.6 MG/DL


(0.2-1.0)


 


Aspartate Amino Transf


(AST/SGOT) 


 37 U/L (15-37)





 


Alanine Aminotransferase


(ALT/SGPT) 


 29 U/L (12-78)





 


Alkaline Phosphatase


 


 229 U/L


()  H


 


C-Reactive Protein,


Quantitative 


 9.2 mg/dL


(0.00-0.90)  H


 


Total Protein


 


 7.5 G/DL


(6.4-8.2)


 


Albumin


 


 3.1 G/DL


(3.4-5.0)  L


 


Globulin  4.4 g/dL  


 


Albumin/Globulin Ratio


 


 0.7 (1.0-2.7)


L


 


Prealbumin  Pending  


 


Amylase Level


 


 33 U/L


()


 


Lipase


 


 128 U/L


()











Current Medications








 Medications


  (Trade)  Dose


 Ordered  Sig/Eddie


 Route


 PRN Reason  Start Time


 Stop Time Status Last Admin


Dose Admin


 


 Acetaminophen


  (Tylenol)  325 mg  Q4H  PRN


 ORAL


 For Pain  1/29/21 00:30


 2/28/21 00:29   





 


 Ceftriaxone


 Sodium 1 gm/


 Dextrose  55 ml @ 


 110 mls/hr  Q24H


 IVPB


   1/29/21 18:00


 2/5/21 17:59  1/29/21 18:11





 


 Dextrose/


 Electrolytes  1,000 ml @ 


 75 mls/hr  [id number]


 IV


   1/29/21 02:00


 2/28/21 01:59  1/30/21 05:36





 


 Latanoprost


  (Xalatan)  1 drop  BEDTIME


 BOTH EYES


   1/29/21 21:00


 2/28/21 20:59  1/29/21 20:18





 


 Pantoprazole


  (Protonix)  40 mg  DAILY


 ORAL


   1/29/21 09:00


 2/28/21 08:59  1/29/21 08:26





 


 Phenytoin


  (Dilantin)  300 mg  BEDTIME


 ORAL


   1/29/21 21:00


 2/28/21 20:59  1/29/21 20:17





 


 Primidone


  (Mysoline)  250 mg  TID


 ORAL


   1/29/21 09:00


 2/28/21 08:59  1/29/21 18:11





 


 Sumatriptan


 Succinate


  (Imitrex)  100 mg  TWICE A DAY  PRN


 ORAL


 For Headache  1/29/21 00:15


 2/28/21 00:14   





 


 Tamsulosin HCl


  (Flomax)  0.4 mg  BEDTIME


 ORAL


   1/29/21 01:00


 2/28/21 00:59  1/29/21 20:17




















Corrie Fletcher M.D.               Jan 30, 2021 09:31

## 2021-01-30 NOTE — NUR
NURSE NOTES:

Patient awake, alert x2, confused; on room air, no sing of distress and shortness of breath; 
no sing of chest pain; IV LAC 20G D51/2NS 20mEq 75cc running; side rails up x2 and padded 
for seizure percussion, bed at lowest position, bed alarm on, breaks engaged; call light 
within reach; per PM shift HersonRN, MD Fletcher is aware that patients COVID result is 
negative: MD Fletcher didn't give to removed isolation status; MD Fletcher said will come today to 
assess this patient; call light within reach; will keep monitoring.

## 2021-01-30 NOTE — NUR
NURSE HAND-OFF: 



Important Events on Shift: Fall precaution, seizure precaution, covid negative

Patient Status: Stable

Diet: Regular mechanical chopped



Pending Orders: []

Pending Results/Labs:[]

Pending MD notification:[]



Latest Vital Signs: Temperature 97.7 , Pulse 81 , B/P 124 /68 , Respiratory Rate 16 , O2 SAT 
93 , Room Air, O2 Flow Rate .  

Vital Sign Comment: VS stable



Latest Copeland Fall Score: 95  

Fall Risk: High Risk 

Safety Measures: Call light Within Reach, Bed Alarm Zone 1, Side Rails Side Rails x3, Bed 
position Low and Locked.

Fall Precautions: 

Yellow Socks

Yellow Gown

Door Sign



Report given to Glo LO.

## 2021-01-30 NOTE — PULMONOLOGY PROGRESS NOTE
Subjective


ROS Limited/Unobtainable:  No


Interval Events:  none major reported per nursing


Constitutional:  Reports: no symptoms


HEENT:  Repors: no symptoms


Respiratory:  Reports: no symptoms


Cardiovascular:  Reports: no symptoms


Gastrointestinal/Abdominal:  Reports: no symptoms


Allergies:  


Coded Allergies:  


     SHELLFISH DERIVED (Unverified  Allergy, Intermediate, ITCHING/HIVES, 

7/25/14)


     IODINE (Unverified  Allergy, Unknown, 7/25/14)


Uncoded Allergies:  


     SPINACH (Allergy, Intermediate, SEVERE ITCHING/HIVES, 7/25/14)





Objective





Last 24 Hour Vital Signs








  Date Time  Temp Pulse Resp B/P (MAP) Pulse Ox O2 Delivery O2 Flow Rate FiO2


 


1/30/21 09:00      Room Air  


 


1/30/21 08:00 97.2 80 18 138/77 (97) 95   


 


1/30/21 04:00 97.7 81 16 124/68 (86) 93   


 


1/30/21 00:00 97.9 79 16 117/73 (88) 94   


 


1/29/21 21:00      Room Air  


 


1/29/21 20:00 97.7 85 18 121/70 (87) 94   


 


1/29/21 16:00 98.5 82 18 121/73 (89) 94   


 


1/29/21 12:00 98.7 89 19 104/71 (82) 96   

















Intake and Output  


 


 1/29/21 1/30/21





 19:00 07:00


 


Intake Total 1660 ml 990 ml


 


Balance 1660 ml 990 ml


 


  


 


Intake Oral 800 ml 240 ml


 


IV Total 860 ml 750 ml


 


# Voids 13 4








General Appearance:  no acute distress


HEENT:  atraumatic


Respiratory:  lungs clear


Abdomen:  soft, non tender





Microbiology








 Date/Time


Source Procedure


Growth Status





 


 1/28/21 17:20


Urine,Clean Catch Urine Culture - Preliminary


Gram Positive Cocci Resulted


 


 1/28/21 16:35


Nasopharynx Coronavirus COVID-19 PCR ([facility]) - Final Complete


 


 1/28/21 15:20


Blood Blood Culture - Preliminary


NO GROWTH AFTER 24 HOURS Resulted








Laboratory Tests


1/29/21 11:10: 


Hepatitis B Surface Antigen [Pending], Hepatitis B Surface Antibody, Quant 

[Pending], Hepatitis B Core Total Antibody [Pending], Hepatitis B DNA (IU/mL) 

[Pending], Hepatitis C Antibody [Pending], HIV (1&2) Antibody Rapid [Pending]


1/30/21 05:35: 


White Blood Count 5.9, Red Blood Count 4.10L, Hemoglobin 14.0L, Hematocrit 39.5L

, Mean Corpuscular Volume 96, Mean Corpuscular Hemoglobin 34.0H, Mean 

Corpuscular Hemoglobin Concent 35.3, Red Cell Distribution Width 15.4H, Platelet

 Count 221, Mean Platelet Volume 7.2, Neutrophils (%) (Auto) 57.2, Lymphocytes 

(%) (Auto) 31.1, Monocytes (%) (Auto) 10.1H, Eosinophils (%) (Auto) 0.9, 

Basophils (%) (Auto) 0.6, Erythrocyte Sedimentation Rate 26H, Prothrombin Time 

11.5, Prothromb Time International Ratio 1.0, Activated Partial Thromboplast 

Time 30, Sodium Level 140, Potassium Level 3.8, Chloride Level 103, Carbon 

Dioxide Level 29, Anion Gap 8, Blood Urea Nitrogen 9, Creatinine 0.8, Estimat 

Glomerular Filtration Rate > 60, Glucose Level 90, Calcium Level 9.4, Total 

Bilirubin 0.6, Aspartate Amino Transf (AST/SGOT) 37, Alanine Aminotransferase 

(ALT/SGPT) 29, Alkaline Phosphatase 229H, C-Reactive Protein, Quantitative 9.2H,

 Total Protein 7.5, Albumin 3.1L, Globulin 4.4, Albumin/Globulin Ratio 0.7L, 

Prealbumin [Pending], Amylase Level 33, Lipase 128





Current Medications








 Medications


  (Trade)  Dose


 Ordered  Sig/Eddie


 Route


 PRN Reason  Start Time


 Stop Time Status Last Admin


Dose Admin


 


 Acetaminophen


  (Tylenol)  325 mg  Q4H  PRN


 ORAL


 For Pain  1/29/21 00:30


 2/28/21 00:29   





 


 Ceftriaxone


 Sodium 1 gm/


 Dextrose  55 ml @ 


 110 mls/hr  Q24H


 IVPB


   1/29/21 18:00


 2/5/21 17:59  1/29/21 18:11





 


 Dextrose/


 Electrolytes  1,000 ml @ 


 75 mls/hr  [id number]


 IV


   1/29/21 02:00


 2/28/21 01:59  1/30/21 05:36





 


 Latanoprost


  (Xalatan)  1 drop  BEDTIME


 BOTH EYES


   1/29/21 21:00


 2/28/21 20:59  1/29/21 20:18





 


 Pantoprazole


  (Protonix)  40 mg  DAILY


 ORAL


   1/29/21 09:00


 2/28/21 08:59  1/30/21 09:43





 


 Phenytoin


  (Dilantin)  300 mg  BEDTIME


 ORAL


   1/29/21 21:00


 2/28/21 20:59  1/29/21 20:17





 


 Primidone


  (Mysoline)  250 mg  TID


 ORAL


   1/29/21 09:00


 2/28/21 08:59  1/30/21 09:43





 


 Sumatriptan


 Succinate


  (Imitrex)  100 mg  TWICE A DAY  PRN


 ORAL


 For Headache  1/29/21 00:15


 2/28/21 00:14   





 


 Tamsulosin HCl


  (Flomax)  0.4 mg  BEDTIME


 ORAL


   1/29/21 01:00


 2/28/21 00:59  1/29/21 20:17














Assessment/Plan


Assessment/Plan


1. History of COPD


   -Patient denies using home oxygen


   -Currently saturating well on room air


   -Monitor for hypoxia and provide supplemental oxygen as needed


2. COVID-19 PCR negative





3. History of hepatitis B


   -Per patient, he does not have it anymore


   -Abdominal US shows coarse hepatic echotexture.  No cirrhosis or space-

occupying lesion seen.


   - ID following


   - hep panel pending


4. UTI


   -Preliminary urine culture did not show growth


   -On Rocephin for possible UTI per ID


   - ID following


5. DVT ppx 


   - on Heparin subcu





The care for this patient was discussed with my supervising physician.


Time spent for this case was approximately 31 minutes.











Teo Jay                 Jan 30, 2021 10:37

## 2021-01-30 NOTE — SURGERY PROGRESS NOTE
Surgery Progress Note


Subjective


Symptoms:  pain same, tolerating diet, voiding well, passing flatus, BM





Objective





Last 24 Hour Vital Signs








  Date Time  Temp Pulse Resp B/P (MAP) Pulse Ox O2 Delivery O2 Flow Rate FiO2


 


1/30/21 09:00      Room Air  


 


1/30/21 08:00 97.2 80 18 138/77 (97) 95   


 


1/30/21 04:00 97.7 81 16 124/68 (86) 93   


 


1/30/21 00:00 97.9 79 16 117/73 (88) 94   


 


1/29/21 21:00      Room Air  


 


1/29/21 20:00 97.7 85 18 121/70 (87) 94   


 


1/29/21 16:00 98.5 82 18 121/73 (89) 94   


 


1/29/21 12:00 98.7 89 19 104/71 (82) 96   








I&O











Intake and Output  


 


 1/29/21 1/30/21





 19:00 07:00


 


Intake Total 1660 ml 1065 ml


 


Balance 1660 ml 1065 ml


 


  


 


Intake Oral 800 ml 240 ml


 


IV Total 860 ml 825 ml


 


# Voids 13 4








Cardiovascular:  RSR


Respiratory:  clear


Abdomen:  soft, non-tender, present bowel sounds, non-distended


Extremities:  no edema, no tenderness, no cyanosis





Laboratory Tests








Test


 1/30/21


05:35


 


White Blood Count


 5.9 K/UL


(4.8-10.8)


 


Red Blood Count


 4.10 M/UL


(4.70-6.10)  L


 


Hemoglobin


 14.0 G/DL


(14.2-18.0)  L


 


Hematocrit


 39.5 %


(42.0-52.0)  L


 


Mean Corpuscular Volume 96 FL (80-99)  


 


Mean Corpuscular Hemoglobin


 34.0 PG


(27.0-31.0)  H


 


Mean Corpuscular Hemoglobin


Concent 35.3 G/DL


(32.0-36.0)


 


Red Cell Distribution Width


 15.4 %


(11.6-14.8)  H


 


Platelet Count


 221 K/UL


(150-450)


 


Mean Platelet Volume


 7.2 FL


(6.5-10.1)


 


Neutrophils (%) (Auto)


 57.2 %


(45.0-75.0)


 


Lymphocytes (%) (Auto)


 31.1 %


(20.0-45.0)


 


Monocytes (%) (Auto)


 10.1 %


(1.0-10.0)  H


 


Eosinophils (%) (Auto)


 0.9 %


(0.0-3.0)


 


Basophils (%) (Auto)


 0.6 %


(0.0-2.0)


 


Erythrocyte Sedimentation Rate


 26 MM/HR


(0-20)  H


 


Prothrombin Time


 11.5 SEC


(9.30-11.50)


 


Prothromb Time International


Ratio 1.0 (0.9-1.1)  





 


Activated Partial


Thromboplast Time 30 SEC (23-33)





 


Sodium Level


 140 MMOL/L


(136-145)


 


Potassium Level


 3.8 MMOL/L


(3.5-5.1)


 


Chloride Level


 103 MMOL/L


()


 


Carbon Dioxide Level


 29 MMOL/L


(21-32)


 


Anion Gap


 8 mmol/L


(5-15)


 


Blood Urea Nitrogen


 9 mg/dL (7-18)





 


Creatinine


 0.8 MG/DL


(0.55-1.30)


 


Estimat Glomerular Filtration


Rate > 60 mL/min


(>60)


 


Glucose Level


 90 MG/DL


()


 


Calcium Level


 9.4 MG/DL


(8.5-10.1)


 


Total Bilirubin


 0.6 MG/DL


(0.2-1.0)


 


Aspartate Amino Transf


(AST/SGOT) 37 U/L (15-37)





 


Alanine Aminotransferase


(ALT/SGPT) 29 U/L (12-78)





 


Alkaline Phosphatase


 229 U/L


()  H


 


C-Reactive Protein,


Quantitative 9.2 mg/dL


(0.00-0.90)  H


 


Total Protein


 7.5 G/DL


(6.4-8.2)


 


Albumin


 3.1 G/DL


(3.4-5.0)  L


 


Globulin 4.4 g/dL  


 


Albumin/Globulin Ratio


 0.7 (1.0-2.7)


L


 


Prealbumin Pending  


 


Amylase Level


 33 U/L


()


 


Lipase


 128 U/L


()











Plan


Problems:  


(1) Abnormal LFTs


Assessment & Plan:  70 year old male with history of hepatitis comes in for 

weakness noted to have abnormal lft's.  


hx hepatitis


alk phos elevated


US noted


The liver demonstrates diffuse coarse hepatic echotexture. No ductal dilatation


noted. Spleen is unremarkable. The gallbladder is without sludge or stone. 

Common


bile duct measures 2 mm. The pancreas, aorta and cava are grossly unremarkable 

to the


extent visualized appear  The kidneys are normal in size, shape and axis.  No 

ascites


noted.


okayf or diet


will monitor with exam


trend labs


thank you 


DAILY ESTIMATED NEEDS:


Needs based on Pulmonary 58kg  


25-35  kcals/kg 


9825-7483  total kcals


1-1.5  g protein/kg


58-87  g total protein 


25-30  mL/kg


5776-7956  total fluid mLs





NUTRITION DIAGNOSIS:


Increased kcal needs r/t Low BMI as evidenced by BMI 18.4, underweight per


guidelines 77% of Ideal Body Weight.





CURRENT DIET: Regular ms chopped    





PO DIET RECOMMENDATIONS:


With good po intake >75%, rec Low Na diet/ texture as tolerated  








ADDITIONAL RECOMMENDATIONS:


1) Monitor po intake, rec SLP eval  


2) Obtain a standing weight as able or calibrated bed scale  


3) Check lytes daily, replete as needed  


4) Add Ensure Enlive BID  





(2) Encephalopathy


(3) Weakness


(4) UTI (urinary tract infection)


(5) Altered mental status


(6) Falls frequently


(7) Cerebellar atrophy


(8) Hip fracture, intertrochanteric


(9) Seizure disorder, complex partial


(10) Pulmonary embolism


(11) ACS (acute coronary syndrome)


(12) Multiple injuries due to trauma











Ricardo Pineda                Jan 30, 2021 11:48

## 2021-01-30 NOTE — NUR
NURSE HAND-OFF: 



Important Events on Shift:Fall percusion, IV fluid hydration and atibiotics

Patient Status: 

Diet: 



Pending Orders: 

Pending Results/Labs:

Pending MD notification:



Latest Vital Signs: Temperature 97.8 , Pulse 75 , B/P 127 /85 , Respiratory Rate 20 , O2 SAT 
95 , Room Air, O2 Flow Rate .  

Vital Sign Comment: 



Latest Copeland Fall Score: 95  

Fall Risk: High Risk 

Safety Measures: Call light Within Reach, Bed Alarm Zone 1, Side Rails Side Rails x3, Bed 
position Low and Locked.

Fall Precautions: 

Yellow Socks

Yellow Gown

Door Sign



Report given to .

## 2021-01-31 VITALS — DIASTOLIC BLOOD PRESSURE: 81 MMHG | SYSTOLIC BLOOD PRESSURE: 139 MMHG

## 2021-01-31 VITALS — SYSTOLIC BLOOD PRESSURE: 112 MMHG | DIASTOLIC BLOOD PRESSURE: 65 MMHG

## 2021-01-31 VITALS — DIASTOLIC BLOOD PRESSURE: 87 MMHG | SYSTOLIC BLOOD PRESSURE: 133 MMHG

## 2021-01-31 VITALS — SYSTOLIC BLOOD PRESSURE: 130 MMHG | DIASTOLIC BLOOD PRESSURE: 81 MMHG

## 2021-01-31 VITALS — SYSTOLIC BLOOD PRESSURE: 134 MMHG | DIASTOLIC BLOOD PRESSURE: 80 MMHG

## 2021-01-31 RX ADMIN — SUMATRIPTAN SUCCINATE PRN MG: 100 TABLET, FILM COATED ORAL at 05:54

## 2021-01-31 RX ADMIN — DEXTROSE, SODIUM CHLORIDE, AND POTASSIUM CHLORIDE SCH MLS/HR: 5; .45; .15 INJECTION INTRAVENOUS at 06:30

## 2021-01-31 RX ADMIN — SUMATRIPTAN SUCCINATE PRN MG: 100 TABLET, FILM COATED ORAL at 20:11

## 2021-01-31 RX ADMIN — SODIUM CHLORIDE SCH MLS/HR: 0.9 INJECTION INTRAVENOUS at 17:53

## 2021-01-31 RX ADMIN — PHENYTOIN SODIUM SCH MG: 100 CAPSULE, EXTENDED RELEASE ORAL at 22:12

## 2021-01-31 RX ADMIN — DEXTROSE, SODIUM CHLORIDE, AND POTASSIUM CHLORIDE SCH MLS/HR: 5; .45; .15 INJECTION INTRAVENOUS at 19:56

## 2021-01-31 RX ADMIN — LATANOPROST SCH DROP: 50 SOLUTION OPHTHALMIC at 20:10

## 2021-01-31 RX ADMIN — TAMSULOSIN HYDROCHLORIDE SCH MG: 0.4 CAPSULE ORAL at 20:10

## 2021-01-31 NOTE — NUR
PT EVALUATION NOTE

Patient seen for initial evaluation and treatment initiated. Patient presents with 
generalized weakness and decreased balance which impairs patient's ability to perform 
mobility skills safely. Patient requires mod/max assist for bed mobility. Patient with P+ 
sitting balance, attempts to use his UEs to maintain sitting however loses his balance due 
to UE weakness. Patient unable to stand or ambulate at this time due to weakness. Patient 
will benefit from skilled inpatient PT intervention to increase strength and postural 
stability for improved level of functional mobility and safety. Recommend discharge to SNF 
for continued rehab once medically cleared by MD. 








-------------------------------------------------------------------------------

Addendum: 01/31/21 at 1027 by RAMIRO HERNANDEZ PT

-------------------------------------------------------------------------------

Amended: Links added.

## 2021-01-31 NOTE — PULMONOLOGY PROGRESS NOTE
Subjective


ROS Limited/Unobtainable:  Yes


Interval Events:  none major reported per nursing


Constitutional:  Reports: no symptoms


HEENT:  Repors: no symptoms


Respiratory:  Reports: no symptoms


Cardiovascular:  Reports: no symptoms


Gastrointestinal/Abdominal:  Reports: no symptoms


Allergies:  


Coded Allergies:  


     SHELLFISH DERIVED (Unverified  Allergy, Intermediate, ITCHING/HIVES, 

7/25/14)


     IODINE (Unverified  Allergy, Unknown, 7/25/14)


Uncoded Allergies:  


     SPINACH (Allergy, Intermediate, SEVERE ITCHING/HIVES, 7/25/14)





Objective





Last 24 Hour Vital Signs








  Date Time  Temp Pulse Resp B/P (MAP) Pulse Ox O2 Delivery O2 Flow Rate FiO2


 


1/31/21 09:00      Room Air  


 


1/31/21 08:00 96.2 81 18 134/80 (98) 95   


 


1/31/21 04:00 97.4 83 18 112/65 (81) 96   


 


1/30/21 23:26 97.5 69 17 117/67 (84) 96   


 


1/30/21 20:35      Room Air  


 


1/30/21 20:00 97.2 78 18 121/73 (89) 96   


 


1/30/21 16:00 97.8 75 20 127/85 (99) 95   


 


1/30/21 12:00 98.0 78 18 136/72 (93) 95   

















Intake and Output  


 


 1/30/21 1/31/21





 19:00 07:00


 


Intake Total 1295 ml 200 ml


 


Output Total  400 ml


 


Balance 1295 ml -200 ml


 


  


 


Intake Oral 360 ml 200 ml


 


IV Total 935 ml 


 


Output Urine Total  400 ml


 


# Voids 4 








General Appearance:  no acute distress


HEENT:  atraumatic


Respiratory:  lungs clear


Abdomen:  soft, non tender





Microbiology








 Date/Time


Source Procedure


Growth Status





 


 1/28/21 17:20


Urine,Clean Catch Urine Culture - Final


Aerococcus Urinae Complete


 


 1/28/21 16:35


Nasopharynx Coronavirus COVID-19 PCR ([facility]) - Final Complete


 


 1/28/21 15:20


Blood Blood Culture - Preliminary


NO GROWTH AFTER 48 HOURS Resulted











Current Medications








 Medications


  (Trade)  Dose


 Ordered  Sig/Eddie


 Route


 PRN Reason  Start Time


 Stop Time Status Last Admin


Dose Admin


 


 Acetaminophen


  (Tylenol)  325 mg  Q4H  PRN


 ORAL


 For Pain  1/29/21 00:30


 2/28/21 00:29  1/31/21 10:19





 


 Ceftriaxone


 Sodium 1 gm/


 Dextrose  55 ml @ 


 110 mls/hr  Q24H


 IVPB


   1/29/21 18:00


 2/5/21 17:59  1/30/21 17:13





 


 Dextrose/


 Electrolytes  1,000 ml @ 


 75 mls/hr  [id number]


 IV


   1/29/21 02:00


 2/28/21 01:59  1/31/21 06:30





 


 Latanoprost


  (Xalatan)  1 drop  BEDTIME


 BOTH EYES


   1/29/21 21:00


 2/28/21 20:59  1/30/21 21:14





 


 Pantoprazole


  (Protonix)  40 mg  DAILY


 ORAL


   1/29/21 09:00


 2/28/21 08:59  1/31/21 08:20





 


 Phenytoin


  (Dilantin)  300 mg  BEDTIME


 ORAL


   1/29/21 21:00


 2/28/21 20:59  1/30/21 21:14





 


 Primidone


  (Mysoline)  250 mg  TID


 ORAL


   1/29/21 09:00


 2/28/21 08:59  1/31/21 08:19





 


 Sumatriptan


 Succinate


  (Imitrex)  100 mg  TWICE A DAY  PRN


 ORAL


 For Headache  1/29/21 00:15


 2/28/21 00:14  1/31/21 05:54





 


 Tamsulosin HCl


  (Flomax)  0.4 mg  BEDTIME


 ORAL


   1/29/21 01:00


 2/28/21 00:59  1/30/21 21:14














Assessment/Plan


Assessment/Plan


1. History of COPD


   -Patient denies using home oxygen


   -Currently saturating well on room air


   -Monitor for hypoxia and provide supplemental oxygen as needed


2. COVID-19 PCR negative


   - off isolation


3. History of hepatitis B


   -Per patient, he does not have it anymore


   -Abdominal US shows coarse hepatic echotexture.  No cirrhosis or space-

occupying lesion seen.


   - ID following


   - hep panel pending


4. UTI


   -Preliminary urine culture did not show growth


   -On Rocephin for possible UTI per ID


   - ID following


5. DVT ppx 


   - on Heparin subcu





PT recommends placement to SNF due to weakness





The care for this patient was discussed with my supervising physician.


Time spent for this case was approximately 31 minutes.











Teo Jay                 Jan 31, 2021 11:43

## 2021-01-31 NOTE — NUR
NURSE NOTES:

Received patient in bed, awake and able to make needs known. On room air with no signs of 
respiratory distress or SOB. No complaint of pain or discomfort. Left AC IV intact and 
patent. Bed locked and in lowest position. Call light in reach. Will continue monitoring 
patient and follow up with the plan of care.

## 2021-01-31 NOTE — NUR
NURSE HAND-OFF: 



Important Events on Shift:[]

Patient Status: []

Diet: [reg mech soft chopped]



Pending Orders: [cbc, bmp]

Pending Results/Labs:[]

Pending MD notification:[]



Latest Vital Signs: Temperature 97.2 , Pulse 90 , B/P 139 /81 , Respiratory Rate 18 , O2 SAT 
93 , Room Air, O2 Flow Rate .  

Vital Sign Comment: []



Latest Copeland Fall Score: 95  

Fall Risk: High Risk 

Safety Measures: Call light Within Reach, Bed Alarm Zone 1, Side Rails Side Rails x3, Bed 
position Low and Locked.

Fall Precautions: 

Yellow Socks

Yellow Gown

Door Sign



Report given to [TERESITA Mcdowell].

## 2021-01-31 NOTE — INTERNAL MED PROGRESS NOTE
Subjective


Date of Service:  Jan 31, 2021


Physician Name


Dominic Temple


Attending Physician


Aron Padron MD





Current Medications








 Medications


  (Trade)  Dose


 Ordered  Sig/Eddie


 Route


 PRN Reason  Start Time


 Stop Time Status Last Admin


Dose Admin


 


 Acetaminophen


  (Tylenol)  325 mg  Q4H  PRN


 ORAL


 For Pain  1/29/21 00:30


 2/28/21 00:29  1/31/21 10:19





 


 Ceftriaxone


 Sodium 1 gm/


 Dextrose  55 ml @ 


 110 mls/hr  Q24H


 IVPB


   1/29/21 18:00


 2/5/21 17:59  1/30/21 17:13





 


 Dextrose/


 Electrolytes  1,000 ml @ 


 75 mls/hr  [id number]


 IV


   1/29/21 02:00


 2/28/21 01:59  1/31/21 06:30





 


 Latanoprost


  (Xalatan)  1 drop  BEDTIME


 BOTH EYES


   1/29/21 21:00


 2/28/21 20:59  1/30/21 21:14





 


 Pantoprazole


  (Protonix)  40 mg  DAILY


 ORAL


   1/29/21 09:00


 2/28/21 08:59  1/31/21 08:20





 


 Phenytoin


  (Dilantin)  300 mg  BEDTIME


 ORAL


   1/29/21 21:00


 2/28/21 20:59  1/30/21 21:14





 


 Primidone


  (Mysoline)  250 mg  TID


 ORAL


   1/29/21 09:00


 2/28/21 08:59  1/31/21 12:58





 


 Sumatriptan


 Succinate


  (Imitrex)  100 mg  TWICE A DAY  PRN


 ORAL


 For Headache  1/29/21 00:15


 2/28/21 00:14  1/31/21 05:54





 


 Tamsulosin HCl


  (Flomax)  0.4 mg  BEDTIME


 ORAL


   1/29/21 01:00


 2/28/21 00:59  1/30/21 21:14











Allergies:  


Coded Allergies:  


     SHELLFISH DERIVED (Unverified  Allergy, Intermediate, ITCHING/HIVES, 

7/25/14)


     IODINE (Unverified  Allergy, Unknown, 7/25/14)


Uncoded Allergies:  


     SPINACH (Allergy, Intermediate, SEVERE ITCHING/HIVES, 7/25/14)


ROS Limited/Unobtainable:  Yes


Subjective


71 YO M admitted with altered mental status.  Now UTI and sepsis.  Cover for Int

Med - Dr Padron





Objective





Last Vital Signs








  Date Time  Temp Pulse Resp B/P (MAP) Pulse Ox O2 Delivery O2 Flow Rate FiO2


 


1/31/21 10:49 96.2       


 


1/31/21 09:00      Room Air  


 


1/31/21 08:00  81 18 134/80 (98) 95   


 


1/28/21 21:56        100











Microbiology








 Date/Time


Source Procedure


Growth Status





 


 1/28/21 17:20


Urine,Clean Catch Urine Culture - Final


Aerococcus Urinae Complete


 


 1/28/21 16:35


Nasopharynx Coronavirus COVID-19 PCR ([facility]) - Final Complete


 


 1/28/21 15:20


Blood Blood Culture - Preliminary


NO GROWTH AFTER 48 HOURS Resulted

















Intake and Output  


 


 1/30/21 1/31/21





 19:00 07:00


 


Intake Total 1295 ml 200 ml


 


Output Total  400 ml


 


Balance 1295 ml -200 ml


 


  


 


Intake Oral 360 ml 200 ml


 


IV Total 935 ml 


 


Output Urine Total  400 ml


 


# Voids 4 








Objective


Objective


General: No acute distress, awake and alert


HEENT: NCAT, sclera anicteric, PERRL, EOMI.


Neck: Supple, no significant jugular venous distention, 


Lungs: fair inspiratory effort, decreased air in the bases, no Wheeze or Rales.


Heart: Regular rate and rhythm, normal S1/S2, no murmurs.


Abdomen: soft, nontender, nondistended. Normoactive bowel sounds.


 / Rectal: Refused and deferred.


Extremities: No Cyanosis , clubbing or edema. 


Neuro: A&O x 3, Able to move all extremities, bilateral lower extremity weakness


Skin: warm, no rash.


Psych: Normal mood and affect





Assessment/Plan


altered mental status


toxic metabolic encephalopathy


urinary tract infection=Aerococcus urinae


 dehydration.


Sepsis secondary to urinary tract infection.


Dehydration.


Acute kidney injury.


Seizure disorder.


COPD.


History of hepatitis B+


BPH








Plan:


Follow-up with laboratory as well as culture in the morning


Antibiotics: Rocephin IV


CODE STATUS full code.


DVT prophylaxis heparin subcu.


Follow-up with infection disease recommendation.


PT mobility consultation.


Inf Dis=Dr. Corrie Fletcher


Pulmonary=Dominic Goldberg MD               Jan 31, 2021 13:12

## 2021-01-31 NOTE — SURGERY PROGRESS NOTE
Surgery Progress Note


Subjective


Symptoms:  improved, pain absent, tolerating diet, passing flatus, BM





Objective





Last 24 Hour Vital Signs








  Date Time  Temp Pulse Resp B/P (MAP) Pulse Ox O2 Delivery O2 Flow Rate FiO2


 


1/31/21 09:00      Room Air  


 


1/31/21 08:00 96.2 81 18 134/80 (98) 95   


 


1/31/21 04:00 97.4 83 18 112/65 (81) 96   


 


1/30/21 23:26 97.5 69 17 117/67 (84) 96   


 


1/30/21 20:35      Room Air  


 


1/30/21 20:00 97.2 78 18 121/73 (89) 96   


 


1/30/21 16:00 97.8 75 20 127/85 (99) 95   








I&O











Intake and Output  


 


 1/30/21 1/31/21





 19:00 07:00


 


Intake Total 1295 ml 200 ml


 


Output Total  400 ml


 


Balance 1295 ml -200 ml


 


  


 


Intake Oral 360 ml 200 ml


 


IV Total 935 ml 


 


Output Urine Total  400 ml


 


# Voids 4 








Cardiovascular:  RSR


Respiratory:  clear


Abdomen:  soft, flat, non-tender, present bowel sounds, non-distended


Extremities:  no edema, no tenderness, no cyanosis





Plan


Problems:  


(1) Abnormal LFTs


Assessment & Plan:  70 year old male with history of hepatitis comes in for 

weakness noted to have abnormal lft's.  


hx hepatitis


alk phos elevated


US noted


The liver demonstrates diffuse coarse hepatic echotexture. No ductal dilatation


noted. Spleen is unremarkable. The gallbladder is without sludge or stone. 

Common


bile duct measures 2 mm. The pancreas, aorta and cava are grossly unremarkable 

to the


extent visualized appear  The kidneys are normal in size, shape and axis.  No 

ascites


noted.


okay or diet


will monitor with exam


trend labs


thank you 


DAILY ESTIMATED NEEDS:


Needs based on Pulmonary 58kg  


25-35  kcals/kg 


0944-8607  total kcals


1-1.5  g protein/kg


58-87  g total protein 


25-30  mL/kg


9891-3265  total fluid mLs





NUTRITION DIAGNOSIS:


Increased kcal needs r/t Low BMI as evidenced by BMI 18.4, underweight per


guidelines 77% of Ideal Body Weight.





CURRENT DIET: Regular ms chopped    





PO DIET RECOMMENDATIONS:


With good po intake >75%, rec Low Na diet/ texture as tolerated  








ADDITIONAL RECOMMENDATIONS:


1) Monitor po intake, rec SLP eval  


2) Obtain a standing weight as able or calibrated bed scale  


3) Check lytes daily, replete as needed  


4) Add Ensure Enlive BID  





(2) Encephalopathy


(3) Weakness


(4) UTI (urinary tract infection)


(5) Altered mental status


(6) Falls frequently


(7) Cerebellar atrophy


(8) Hip fracture, intertrochanteric


(9) Seizure disorder, complex partial


(10) Pulmonary embolism


(11) ACS (acute coronary syndrome)


(12) Multiple injuries due to trauma











Ricardo Pineda                Jan 31, 2021 12:23

## 2021-01-31 NOTE — NUR
NURSE HAND-OFF: 



Important Events on Shift: No acute events

Patient Status: Stable

Diet: Reg mech soft chopped



Pending Orders: N/A

Pending Results/Labs: No labs

Pending MD notification: N/A



Latest Vital Signs: Temperature 97.4 , Pulse 83 , B/P 112 /65 , Respiratory Rate 18 , O2 SAT 
96 , Room Air, O2 Flow Rate .  

Vital Sign Comment: N/A



Latest Copeland Fall Score: 95  

Fall Risk: High Risk 

Safety Measures: Call light Within Reach, Bed Alarm Zone 1, Side Rails Side Rails x3, Bed 
position Low and Locked.

Fall Precautions: 

Yellow Socks

Yellow Gown

Door Sign

-------------------------------------------------------------------------------

Addendum: 01/31/21 at 0724 by ROSALBA CLARK RN

-------------------------------------------------------------------------------

Report given to TERESITA Simeon

## 2021-02-01 VITALS — SYSTOLIC BLOOD PRESSURE: 132 MMHG | DIASTOLIC BLOOD PRESSURE: 75 MMHG

## 2021-02-01 VITALS — SYSTOLIC BLOOD PRESSURE: 123 MMHG | DIASTOLIC BLOOD PRESSURE: 76 MMHG

## 2021-02-01 VITALS — DIASTOLIC BLOOD PRESSURE: 66 MMHG | SYSTOLIC BLOOD PRESSURE: 116 MMHG

## 2021-02-01 VITALS — SYSTOLIC BLOOD PRESSURE: 122 MMHG | DIASTOLIC BLOOD PRESSURE: 81 MMHG

## 2021-02-01 VITALS — DIASTOLIC BLOOD PRESSURE: 74 MMHG | SYSTOLIC BLOOD PRESSURE: 110 MMHG

## 2021-02-01 VITALS — DIASTOLIC BLOOD PRESSURE: 75 MMHG | SYSTOLIC BLOOD PRESSURE: 114 MMHG

## 2021-02-01 LAB
ADD MANUAL DIFF: NO
ANION GAP SERPL CALC-SCNC: 7 MMOL/L (ref 5–15)
BASOPHILS NFR BLD AUTO: 0.5 % (ref 0–2)
BUN SERPL-MCNC: 12 MG/DL (ref 7–18)
CALCIUM SERPL-MCNC: 9.3 MG/DL (ref 8.5–10.1)
CHLORIDE SERPL-SCNC: 103 MMOL/L (ref 98–107)
CO2 SERPL-SCNC: 27 MMOL/L (ref 21–32)
CREAT SERPL-MCNC: 0.7 MG/DL (ref 0.55–1.3)
EOSINOPHIL NFR BLD AUTO: 0.4 % (ref 0–3)
ERYTHROCYTE [DISTWIDTH] IN BLOOD BY AUTOMATED COUNT: 15.9 % (ref 11.6–14.8)
HCT VFR BLD CALC: 38.7 % (ref 42–52)
HGB BLD-MCNC: 13.2 G/DL (ref 14.2–18)
LYMPHOCYTES NFR BLD AUTO: 21.7 % (ref 20–45)
MCV RBC AUTO: 95 FL (ref 80–99)
MONOCYTES NFR BLD AUTO: 9.3 % (ref 1–10)
NEUTROPHILS NFR BLD AUTO: 68.1 % (ref 45–75)
PLATELET # BLD: 253 K/UL (ref 150–450)
POTASSIUM SERPL-SCNC: 4 MMOL/L (ref 3.5–5.1)
RBC # BLD AUTO: 4.07 M/UL (ref 4.7–6.1)
SODIUM SERPL-SCNC: 137 MMOL/L (ref 136–145)
WBC # BLD AUTO: 7.6 K/UL (ref 4.8–10.8)

## 2021-02-01 RX ADMIN — TAMSULOSIN HYDROCHLORIDE SCH MG: 0.4 CAPSULE ORAL at 20:26

## 2021-02-01 RX ADMIN — PHENYTOIN SODIUM SCH MG: 100 CAPSULE, EXTENDED RELEASE ORAL at 20:26

## 2021-02-01 RX ADMIN — DEXTROSE, SODIUM CHLORIDE, AND POTASSIUM CHLORIDE SCH MLS/HR: 5; .45; .15 INJECTION INTRAVENOUS at 10:03

## 2021-02-01 RX ADMIN — POLYETHYLENE GLYCOL 3350 PRN GM: 17 POWDER, FOR SOLUTION ORAL at 13:59

## 2021-02-01 RX ADMIN — DOCUSATE SODIUM SCH MG: 100 CAPSULE, LIQUID FILLED ORAL at 18:00

## 2021-02-01 RX ADMIN — DEXTROSE, SODIUM CHLORIDE, AND POTASSIUM CHLORIDE SCH MLS/HR: 5; .45; .15 INJECTION INTRAVENOUS at 23:39

## 2021-02-01 RX ADMIN — SODIUM CHLORIDE SCH MLS/HR: 0.9 INJECTION INTRAVENOUS at 17:48

## 2021-02-01 RX ADMIN — DOCUSATE SODIUM SCH MG: 100 CAPSULE, LIQUID FILLED ORAL at 17:48

## 2021-02-01 RX ADMIN — LATANOPROST SCH DROP: 50 SOLUTION OPHTHALMIC at 20:27

## 2021-02-01 NOTE — NUR
NURSE NOTES:

Received patient in bed, seated and having breakfast. Patient is in room air, no signs of 
respiratory distress or SOB. No complaint of pain or discomfort. Right AC and right hand PIV 
intact and patent, receives D5 1/2NS + 20 mEq KCl at 75cc/hr. Bed locked and in lowest 
position. Call light within easy reach. Will continue monitoring patient and follow up with 
the plan of care.

## 2021-02-01 NOTE — NUR
NURSE HAND-OFF: 



Important Events on Shift:[]

Patient Status: []

Diet: [reg mech soft chopped]



Pending Orders: [CBC, CMP]

Pending Results/Labs:[]

Pending MD notification:[]



Latest Vital Signs: Temperature 98.1 , Pulse 76 , B/P 114 /75 , Respiratory Rate 20 , O2 SAT 
99 , Room Air, O2 Flow Rate .  

Vital Sign Comment: []



Latest Copeland Fall Score: 95  

Fall Risk: High Risk 

Safety Measures: Call light Within Reach, Bed Alarm Zone 1, Side Rails Side Rails x3, Bed 
position Low and Locked.

Fall Precautions: 

Yellow Socks

Yellow Gown

Door Sign



Report given to [TERESITA Velasquez].

## 2021-02-01 NOTE — INTERNAL MED PROGRESS NOTE
Subjective


Date of Service:  Feb 1, 2021


Physician Name


Dominic Temple


Attending Physician


Aron Padron MD





Current Medications








 Medications


  (Trade)  Dose


 Ordered  Sig/Eddie


 Route


 PRN Reason  Start Time


 Stop Time Status Last Admin


Dose Admin


 


 Acetaminophen


  (Tylenol)  325 mg  Q4H  PRN


 ORAL


 For Pain  1/29/21 00:30


 2/28/21 00:29  2/1/21 01:32





 


 Ceftriaxone


 Sodium 1 gm/


 Dextrose  55 ml @ 


 110 mls/hr  Q24H


 IVPB


   1/29/21 18:00


 2/5/21 17:59  2/1/21 17:48





 


 Dextrose/


 Electrolytes  1,000 ml @ 


 75 mls/hr  [id number]


 IV


   1/29/21 02:00


 2/28/21 01:59  2/1/21 10:03





 


 Docusate Sodium


  (Colace)  100 mg  TWICE A  DAY


 ORAL


   2/1/21 18:00


 3/3/21 17:59  2/1/21 17:48





 


 Latanoprost


  (Xalatan)  1 drop  BEDTIME


 BOTH EYES


   1/29/21 21:00


 2/28/21 20:59  1/31/21 20:10





 


 Pantoprazole


  (Protonix)  40 mg  DAILY


 ORAL


   1/29/21 09:00


 2/28/21 08:59  2/1/21 08:01





 


 Phenytoin


  (Dilantin)  300 mg  BEDTIME


 ORAL


   1/29/21 21:00


 2/28/21 20:59  1/31/21 22:12





 


 Polyethylene


 Glycol


  (Miralax)  17 gm  DAILYPRN  PRN


 ORAL


 Constipation  2/1/21 12:00


 3/3/21 11:59  2/1/21 13:59





 


 Primidone


  (Mysoline)  250 mg  TID


 ORAL


   1/29/21 09:00


 2/28/21 08:59  2/1/21 17:49





 


 Sumatriptan


 Succinate


  (Imitrex)  100 mg  TWICE A DAY  PRN


 ORAL


 For Headache  1/29/21 00:15


 2/28/21 00:14  1/31/21 20:11





 


 Tamsulosin HCl


  (Flomax)  0.4 mg  BEDTIME


 ORAL


   1/29/21 01:00


 2/28/21 00:59  1/31/21 20:10











Allergies:  


Coded Allergies:  


     SHELLFISH DERIVED (Unverified  Allergy, Intermediate, ITCHING/HIVES, 

7/25/14)


     IODINE (Unverified  Allergy, Unknown, 7/25/14)


Uncoded Allergies:  


     SPINACH (Allergy, Intermediate, SEVERE ITCHING/HIVES, 7/25/14)


ROS Limited/Unobtainable:  Yes


Subjective


69 YO M admitted with altered mental status.  Now UTI and sepsis.  Cover for Int

[facility] - Dr Padron





Objective





Last Vital Signs








  Date Time  Temp Pulse Resp B/P (MAP) Pulse Ox O2 Delivery O2 Flow Rate FiO2


 


2/1/21 16:00 98.1 76 20 114/75 (88) 99   


 


2/1/21 09:00      Room Air  


 


1/28/21 21:56        100











Laboratory Tests








Test


 2/1/21


05:10


 


White Blood Count


 7.6 K/UL


(4.8-10.8)


 


Red Blood Count


 4.07 M/UL


(4.70-6.10)  L


 


Hemoglobin


 13.2 G/DL


(14.2-18.0)  L


 


Hematocrit


 38.7 %


(42.0-52.0)  L


 


Mean Corpuscular Volume 95 FL (80-99)  


 


Mean Corpuscular Hemoglobin


 32.5 PG


(27.0-31.0)  H


 


Mean Corpuscular Hemoglobin


Concent 34.2 G/DL


(32.0-36.0)


 


Red Cell Distribution Width


 15.9 %


(11.6-14.8)  H


 


Platelet Count


 253 K/UL


(150-450)


 


Mean Platelet Volume


 7.3 FL


(6.5-10.1)


 


Neutrophils (%) (Auto)


 68.1 %


(45.0-75.0)


 


Lymphocytes (%) (Auto)


 21.7 %


(20.0-45.0)


 


Monocytes (%) (Auto)


 9.3 %


(1.0-10.0)


 


Eosinophils (%) (Auto)


 0.4 %


(0.0-3.0)


 


Basophils (%) (Auto)


 0.5 %


(0.0-2.0)


 


Sodium Level


 137 MMOL/L


(136-145)


 


Potassium Level


 4.0 MMOL/L


(3.5-5.1)


 


Chloride Level


 103 MMOL/L


()


 


Carbon Dioxide Level


 27 MMOL/L


(21-32)


 


Anion Gap


 7 mmol/L


(5-15)


 


Blood Urea Nitrogen


 12 mg/dL


(7-18)


 


Creatinine


 0.7 MG/DL


(0.55-1.30)


 


Estimat Glomerular Filtration


Rate > 60 mL/min


(>60)


 


Glucose Level


 106 MG/DL


()


 


Calcium Level


 9.3 MG/DL


(8.5-10.1)

















Intake and Output  


 


 1/31/21 2/1/21





 19:00 07:00


 


Intake Total 805 ml 240 ml


 


Output Total 400 ml 


 


Balance 405 ml 240 ml


 


  


 


Intake Oral  240 ml


 


IV Total 805 ml 


 


Output Urine Total 400 ml 


 


# Voids 4 3








Objective


Objective


General: No acute distress, awake and alert


HEENT: NCAT, sclera anicteric, PERRL, EOMI.


Neck: Supple, no significant jugular venous distention, 


Lungs: fair inspiratory effort, decreased air in the bases, no Wheeze or Rales.


Heart: Regular rate and rhythm, normal S1/S2, no murmurs.


Abdomen: soft, nontender, nondistended. Normoactive bowel sounds.


 / Rectal: Refused and deferred.


Extremities: No Cyanosis , clubbing or edema. 


Neuro: A&O x 3, Able to move all extremities, bilateral lower extremity weakness


Skin: warm, no rash.


Psych: Normal mood and affect





Assessment/Plan


altered mental status


toxic metabolic encephalopathy


urinary tract infection=Aerococcus urinae


 dehydration.


Sepsis secondary to urinary tract infection.


Dehydration.


Acute kidney injury.


Seizure disorder.


COPD.


History of hepatitis B+


BPH








Plan:


Antibiotics: Rocephin


CODE STATUS full code.


DVT prophylaxis heparin subcu.


Follow-up with infection disease recommendation.


PT mobility consultation.


Inf Dis=Dr. Corrie Fletcher


Pulmonary=Dominic Goldberg MD                Feb 1, 2021 19:01

## 2021-02-01 NOTE — SURGERY PROGRESS NOTE
Surgery Progress Note


Subjective


Additional Comments


tolerating diet


labs improved


comfortable


no n/v


no abd pain


on abx uti





Objective





Last 24 Hour Vital Signs








  Date Time  Temp Pulse Resp B/P (MAP) Pulse Ox O2 Delivery O2 Flow Rate FiO2


 


2/1/21 12:00 98.0 74 18 123/76 (92) 93   


 


2/1/21 09:00      Room Air  


 


2/1/21 08:00 97.8 89 20 110/74 (86) 95   


 


2/1/21 04:00 97.6 94 24 132/75 (94) 94   


 


2/1/21 00:00 99.3 90 20 122/81 (95) 94   


 


1/31/21 20:29      Room Air  


 


1/31/21 20:00 99.5 95 24 133/87 (102) 93   


 


1/31/21 16:00 97.2 90 18 139/81 (100) 93   








I&O











Intake and Output  


 


 1/31/21 2/1/21





 18:59 06:59


 


Intake Total 805 ml 240 ml


 


Output Total 400 ml 


 


Balance 405 ml 240 ml


 


  


 


Intake Oral  240 ml


 


IV Total 805 ml 


 


Output Urine Total 400 ml 


 


# Voids 4 3








Cardiovascular:  RSR


Respiratory:  clear


Abdomen:  soft, flat, non-tender, present bowel sounds, non-distended


Extremities:  no edema, no tenderness, no cyanosis





Laboratory Tests








Test


 2/1/21


05:10


 


White Blood Count


 7.6 K/UL


(4.8-10.8)


 


Red Blood Count


 4.07 M/UL


(4.70-6.10)  L


 


Hemoglobin


 13.2 G/DL


(14.2-18.0)  L


 


Hematocrit


 38.7 %


(42.0-52.0)  L


 


Mean Corpuscular Volume 95 FL (80-99)  


 


Mean Corpuscular Hemoglobin


 32.5 PG


(27.0-31.0)  H


 


Mean Corpuscular Hemoglobin


Concent 34.2 G/DL


(32.0-36.0)


 


Red Cell Distribution Width


 15.9 %


(11.6-14.8)  H


 


Platelet Count


 253 K/UL


(150-450)


 


Mean Platelet Volume


 7.3 FL


(6.5-10.1)


 


Neutrophils (%) (Auto)


 68.1 %


(45.0-75.0)


 


Lymphocytes (%) (Auto)


 21.7 %


(20.0-45.0)


 


Monocytes (%) (Auto)


 9.3 %


(1.0-10.0)


 


Eosinophils (%) (Auto)


 0.4 %


(0.0-3.0)


 


Basophils (%) (Auto)


 0.5 %


(0.0-2.0)


 


Sodium Level


 137 MMOL/L


(136-145)


 


Potassium Level


 4.0 MMOL/L


(3.5-5.1)


 


Chloride Level


 103 MMOL/L


()


 


Carbon Dioxide Level


 27 MMOL/L


(21-32)


 


Anion Gap


 7 mmol/L


(5-15)


 


Blood Urea Nitrogen


 12 mg/dL


(7-18)


 


Creatinine


 0.7 MG/DL


(0.55-1.30)


 


Estimat Glomerular Filtration


Rate > 60 mL/min


(>60)


 


Glucose Level


 106 MG/DL


()


 


Calcium Level


 9.3 MG/DL


(8.5-10.1)











Plan


Problems:  


(1) Abnormal LFTs


Assessment & Plan:  70 year old male with history of hepatitis comes in for 

weakness noted to have abnormal lft's.  


hx hepatitis


alk phos elevated


US noted


The liver demonstrates diffuse coarse hepatic echotexture. No ductal dilatation


noted. Spleen is unremarkable. The gallbladder is without sludge or stone. 

Common


bile duct measures 2 mm. The pancreas, aorta and cava are grossly unremarkable 

to the


extent visualized appear  The kidneys are normal in size, shape and axis.  No 

ascites


noted.


okay or diet


will monitor with exam


trend labs


thank you 











DAILY ESTIMATED NEEDS:


Needs based on Pulmonary 58kg  


25-35  kcals/kg 


7200-6748  total kcals


1-1.5  g protein/kg


58-87  g total protein 


25-30  mL/kg


0608-6093  total fluid mLs





NUTRITION DIAGNOSIS:


Increased kcal needs r/t Low BMI as evidenced by BMI 18.4, underweight per


guidelines 77% of Ideal Body Weight.








CURRENT DIET: Regular ms chopped    





 


PO DIET RECOMMENDATIONS:


Maintain regular/ texture as tolerated  





 





ADDITIONAL RECOMMENDATIONS:


1) Monitor po intake, rec SLP eval- possible upgrade?  


2) Obtain a standing weight as able or calibrated bed scale  


3) Check lytes daily, replete as needed  


4) Add Ensure Enlive BID  


5) MVI x 1 





(2) Encephalopathy


(3) Weakness


(4) UTI (urinary tract infection)


(5) Altered mental status


(6) Falls frequently


(7) Cerebellar atrophy


(8) Hip fracture, intertrochanteric


(9) Seizure disorder, complex partial


(10) Pulmonary embolism


(11) ACS (acute coronary syndrome)


(12) Multiple injuries due to trauma











Ricardo Pineda                 Feb 1, 2021 13:44

## 2021-02-01 NOTE — NUR
NURSE NOTES:

Received report from Florin LO. Patient is awake, alert and oriented to self and location. 
On room air, breathing is even and unlabored. No complains of pain or distress noted. Side 
rails are padded. IV right AC and right hand intact and patent with no bleeding noted. IVF 
running as ordered. Bed low and locked. Call light within reach.

## 2021-02-01 NOTE — NUR
CASE MANAGEMENT:REVIEW



2/1/21

SI: ENCEPHALOPATHY. SEPSIS D/T  UTI

98.0   74  18  123/76  93% ON RA

H/H-13.2/38.7   



IS: IV ROCEPHIN Q24

IVF@75/HR

COLACE PO BID

DILANTIN PO QHS

PROTONIX PO QD

MYSOLINE PO TID

FLOMAX PO QHS

**MED/SURG STATUS

DCP: FROM HOME

## 2021-02-01 NOTE — PULMONOLOGY PROGRESS NOTE
Subjective


ROS Limited/Unobtainable:  Yes


Interval Events:  none major reported per nursing


Constitutional:  Reports: no symptoms


HEENT:  Repors: no symptoms


Respiratory:  Reports: no symptoms


Cardiovascular:  Reports: no symptoms


Gastrointestinal/Abdominal:  Reports: no symptoms


Allergies:  


Coded Allergies:  


     SHELLFISH DERIVED (Unverified  Allergy, Intermediate, ITCHING/HIVES, 

7/25/14)


     IODINE (Unverified  Allergy, Unknown, 7/25/14)


Uncoded Allergies:  


     SPINACH (Allergy, Intermediate, SEVERE ITCHING/HIVES, 7/25/14)





Objective





Last 24 Hour Vital Signs








  Date Time  Temp Pulse Resp B/P (MAP) Pulse Ox O2 Delivery O2 Flow Rate FiO2


 


2/1/21 08:00 97.8 89 20 110/74 (86) 95   


 


2/1/21 04:00 97.6 94 24 132/75 (94) 94   


 


2/1/21 00:00 99.3 90 20 122/81 (95) 94   


 


1/31/21 20:29      Room Air  


 


1/31/21 20:00 99.5 95 24 133/87 (102) 93   


 


1/31/21 16:00 97.2 90 18 139/81 (100) 93   


 


1/31/21 12:00 97.5 72 18 130/81 (97) 93   


 


1/31/21 10:49 96.2       

















Intake and Output  


 


 1/31/21 2/1/21





 19:00 07:00


 


Intake Total 805 ml 240 ml


 


Output Total 400 ml 


 


Balance 405 ml 240 ml


 


  


 


Intake Oral  240 ml


 


IV Total 805 ml 


 


Output Urine Total 400 ml 


 


# Voids 4 3








General Appearance:  no acute distress


HEENT:  atraumatic


Respiratory:  lungs clear


Abdomen:  soft, non tender


Laboratory Tests


2/1/21 05:10: 


White Blood Count 7.6, Red Blood Count 4.07L, Hemoglobin 13.2L, Hematocrit 38.7L

, Mean Corpuscular Volume 95, Mean Corpuscular Hemoglobin 32.5H, Mean 

Corpuscular Hemoglobin Concent 34.2, Red Cell Distribution Width 15.9H, Platelet

Count 253, Mean Platelet Volume 7.3, Neutrophils (%) (Auto) 68.1, Lymphocytes 

(%) (Auto) 21.7, Monocytes (%) (Auto) 9.3, Eosinophils (%) (Auto) 0.4, Basophils

(%) (Auto) 0.5, Sodium Level 137, Potassium Level 4.0, Chloride Level 103, 

Carbon Dioxide Level 27, Anion Gap 7, Blood Urea Nitrogen 12, Creatinine 0.7, 

Estimat Glomerular Filtration Rate > 60, Glucose Level 106, Calcium Level 9.3





Current Medications








 Medications


  (Trade)  Dose


 Ordered  Sig/Eddie


 Route


 PRN Reason  Start Time


 Stop Time Status Last Admin


Dose Admin


 


 Acetaminophen


  (Tylenol)  325 mg  Q4H  PRN


 ORAL


 For Pain  1/29/21 00:30


 2/28/21 00:29  2/1/21 01:32





 


 Ceftriaxone


 Sodium 1 gm/


 Dextrose  55 ml @ 


 110 mls/hr  Q24H


 IVPB


   1/29/21 18:00


 2/5/21 17:59  1/31/21 17:53





 


 Dextrose/


 Electrolytes  1,000 ml @ 


 75 mls/hr  [id number]


 IV


   1/29/21 02:00


 2/28/21 01:59  1/31/21 06:30





 


 Latanoprost


  (Xalatan)  1 drop  BEDTIME


 BOTH EYES


   1/29/21 21:00


 2/28/21 20:59  1/31/21 20:10





 


 Pantoprazole


  (Protonix)  40 mg  DAILY


 ORAL


   1/29/21 09:00


 2/28/21 08:59  2/1/21 08:01





 


 Phenytoin


  (Dilantin)  300 mg  BEDTIME


 ORAL


   1/29/21 21:00


 2/28/21 20:59  1/31/21 22:12





 


 Primidone


  (Mysoline)  250 mg  TID


 ORAL


   1/29/21 09:00


 2/28/21 08:59  1/31/21 17:52





 


 Sumatriptan


 Succinate


  (Imitrex)  100 mg  TWICE A DAY  PRN


 ORAL


 For Headache  1/29/21 00:15


 2/28/21 00:14  1/31/21 20:11





 


 Tamsulosin HCl


  (Flomax)  0.4 mg  BEDTIME


 ORAL


   1/29/21 01:00


 2/28/21 00:59  1/31/21 20:10














Assessment/Plan


Assessment/Plan


1. History of COPD


   -Patient denies using home oxygen


   -Currently saturating well on room air


   -Monitor for hypoxia and provide supplemental oxygen as needed


2. COVID-19 PCR negative


   - off isolation


3. History of hepatitis B


   -Per patient, he does not have it anymore


   -Abdominal US shows coarse hepatic echotexture.  No cirrhosis or space-

occupying lesion seen.


   - ID following


   - hep panel pending


4. Aerococcus UTI


   -On Rocephin for possible UTI per ID


   - ID following


5. DVT ppx 


   - on Heparin subcu





PT recommends placement to SNF due to weakness





The care for this patient was discussed with my supervising physician.


Time spent for this case was approximately 31 minutes.











Teo Jay                  Feb 1, 2021 09:34

## 2021-02-01 NOTE — INFECTIOUS DISEASES PROG NOTE
Assessment/Plan





69yo M with:





Afebrile


Normal WBC


Satting well on RA


UTI, although asymptomatic


R/o infection


Weakness, failure to thrive


   1/28 BCx NTD


   COVID PCR neg


   UA+, UCx >100k Aerococcus urinae


   CXR: No acute process


   CTH: Neg for acute process





H/o HBV - per pt doesn't have it anymore


Cirrhosis


Elevated alk phos to 200s


   Abd US: COARSE HEPATIC ECHOTEXTURE WHICH MAY BE RELATED THE PATIENT'S HISTORY

OF HEPATITIS B. NO RAFFAELE CIRRHOSIS OR SPACE-OCCUPYING LESION SEEN PRESENTLY.





HIV screen neg





PMH:


COPD


CVA


Seizure d/o


HBV c/b cirrhosis





Plan:


Cont CTX 1g IV daily #5/5 for UTI





F/u HBV Viral load, HBV serologies


F/u HCV Ab screen





Monitor CBC/CMP


Monitor temp curve, hemodynamics


Monitor resp status





D/w RN





Thank you for this consult. [facility] will continue to follow.





Subjective


Allergies:  


Coded Allergies:  


     SHELLFISH DERIVED (Unverified  Allergy, Intermediate, ITCHING/HIVES, 7/2 5/14)


     IODINE (Unverified  Allergy, Unknown, 7/25/14)


Uncoded Allergies:  


     SPINACH (Allergy, Intermediate, SEVERE ITCHING/HIVES, 7/25/14)


AF


NAD on RA


WBC 7.6


Issues w/ intermittent confusion per RN





Objective





Last 24 Hour Vital Signs








  Date Time  Temp Pulse Resp B/P (MAP) Pulse Ox O2 Delivery O2 Flow Rate FiO2


 


2/1/21 09:00      Room Air  


 


2/1/21 08:00 97.8 89 20 110/74 (86) 95   


 


2/1/21 04:00 97.6 94 24 132/75 (94) 94   


 


2/1/21 00:00 99.3 90 20 122/81 (95) 94   


 


1/31/21 20:29      Room Air  


 


1/31/21 20:00 99.5 95 24 133/87 (102) 93   


 


1/31/21 16:00 97.2 90 18 139/81 (100) 93   


 


1/31/21 12:00 97.5 72 18 130/81 (97) 93   


 


1/31/21 10:49 96.2       








Height (Feet):  5


Height (Inches):  10.00


Weight (Pounds):  128


Gen: NAD


HEENT: NCAT


Pulm: BL chest rise 


Abd: Non-distended


Ext: No c/c/e


Skin: No visible rashes


Neuro: Awake





Laboratory Tests








Test


 2/1/21


05:10


 


White Blood Count


 7.6 K/UL


(4.8-10.8)


 


Red Blood Count


 4.07 M/UL


(4.70-6.10)  L


 


Hemoglobin


 13.2 G/DL


(14.2-18.0)  L


 


Hematocrit


 38.7 %


(42.0-52.0)  L


 


Mean Corpuscular Volume 95 FL (80-99)  


 


Mean Corpuscular Hemoglobin


 32.5 PG


(27.0-31.0)  H


 


Mean Corpuscular Hemoglobin


Concent 34.2 G/DL


(32.0-36.0)


 


Red Cell Distribution Width


 15.9 %


(11.6-14.8)  H


 


Platelet Count


 253 K/UL


(150-450)


 


Mean Platelet Volume


 7.3 FL


(6.5-10.1)


 


Neutrophils (%) (Auto)


 68.1 %


(45.0-75.0)


 


Lymphocytes (%) (Auto)


 21.7 %


(20.0-45.0)


 


Monocytes (%) (Auto)


 9.3 %


(1.0-10.0)


 


Eosinophils (%) (Auto)


 0.4 %


(0.0-3.0)


 


Basophils (%) (Auto)


 0.5 %


(0.0-2.0)


 


Sodium Level


 137 MMOL/L


(136-145)


 


Potassium Level


 4.0 MMOL/L


(3.5-5.1)


 


Chloride Level


 103 MMOL/L


()


 


Carbon Dioxide Level


 27 MMOL/L


(21-32)


 


Anion Gap


 7 mmol/L


(5-15)


 


Blood Urea Nitrogen


 12 mg/dL


(7-18)


 


Creatinine


 0.7 MG/DL


(0.55-1.30)


 


Estimat Glomerular Filtration


Rate > 60 mL/min


(>60)


 


Glucose Level


 106 MG/DL


()


 


Calcium Level


 9.3 MG/DL


(8.5-10.1)











Current Medications








 Medications


  (Trade)  Dose


 Ordered  Sig/Eddie


 Route


 PRN Reason  Start Time


 Stop Time Status Last Admin


Dose Admin


 


 Acetaminophen


  (Tylenol)  325 mg  Q4H  PRN


 ORAL


 For Pain  1/29/21 00:30


 2/28/21 00:29  2/1/21 01:32





 


 Ceftriaxone


 Sodium 1 gm/


 Dextrose  55 ml @ 


 110 mls/hr  Q24H


 IVPB


   1/29/21 18:00


 2/5/21 17:59  1/31/21 17:53





 


 Dextrose/


 Electrolytes  1,000 ml @ 


 75 mls/hr  [id number]


 IV


   1/29/21 02:00


 2/28/21 01:59  2/1/21 10:03





 


 Latanoprost


  (Xalatan)  1 drop  BEDTIME


 BOTH EYES


   1/29/21 21:00


 2/28/21 20:59  1/31/21 20:10





 


 Pantoprazole


  (Protonix)  40 mg  DAILY


 ORAL


   1/29/21 09:00


 2/28/21 08:59  2/1/21 08:01





 


 Phenytoin


  (Dilantin)  300 mg  BEDTIME


 ORAL


   1/29/21 21:00


 2/28/21 20:59  1/31/21 22:12





 


 Primidone


  (Mysoline)  250 mg  TID


 ORAL


   1/29/21 09:00


 2/28/21 08:59  2/1/21 10:00





 


 Sumatriptan


 Succinate


  (Imitrex)  100 mg  TWICE A DAY  PRN


 ORAL


 For Headache  1/29/21 00:15


 2/28/21 00:14  1/31/21 20:11





 


 Tamsulosin HCl


  (Flomax)  0.4 mg  BEDTIME


 ORAL


   1/29/21 01:00


 2/28/21 00:59  1/31/21 20:10




















Corrie Fletcher M.D.                Feb 1, 2021 10:38

## 2021-02-01 NOTE — NUR
NURSE HAND-OFF: 



Important Events on Shift: No events

Patient Status: Stable

Diet: Reg mech soft chopped



Pending Orders: N/A

Pending Results/Labs: AM labs

Pending MD notification:N/A



Latest Vital Signs: Temperature 97.6 , Pulse 94 , B/P 132 /75 , Respiratory Rate 24 , O2 SAT 
94 , Room Air, O2 Flow Rate .  

Vital Sign Comment: N/A



Latest Copeland Fall Score: 95  

Fall Risk: High Risk 

Safety Measures: Call light Within Reach, Bed Alarm Zone 1, Side Rails Side Rails x3, Bed 
position Low and Locked.

Fall Precautions: 

Yellow Socks

Yellow Gown

Door Sign



Report given to TERESITA Simeon.

## 2021-02-01 NOTE — HISTORY AND PHYSICAL REPORT
DATE OF ADMISSION:  01/28/2021

CHIEF COMPLAINT:  Generalized weakness.



HISTORY OF PRESENT ILLNESS:  This is a 70-year-old very delightful

 gentleman with past medical history significant for COPD,

history of stroke, seizure disorder, on Dilantin, and history of BPH as

well as migraine headache, who presented to the emergency department from

home after he was noted to have generalized weakness and difficulty

ambulating.  I was called by the physical therapist at home that the

patient has become very weak and unable to ambulate freely, and

subsequently I advised Physical Therapy to call EMS and subsequently

transferred the patient to [facility].  Shortly after initial

evaluation in the emergency department, the patient was admitted to the

hospital with dehydration as well as sepsis secondary to urinary tract

infection.



PAST MEDICAL HISTORY/PAST SURGICAL HISTORY:  As above, history of hepatitis

B positive with liver cirrhosis, COPD, stroke, seizure disorder, BPH,

migraine headache.  The patient has a history of basal cell carcinoma of

the skin in the past as well as epilepsy in the past, seizure for 35

years, prior history of syncope, and neuropathy of the lower

extremities.



MEDICATIONS AT HOME:  Please refer to medication reconciliation.



ALLERGIES:  To iodine, shellfish, and spinach.



SOCIAL HISTORY:  Denies any smoking, alcohol, or drugs at this time.

 



FAMILY HISTORY:  Noncontributory.



REVIEW OF SYSTEMS:  Mostly as above.  Denies any dysuria, frequency, or

hematuria.  Complained of weakness, fatigue, inability to ambulate.

Denies any fever, chills, nausea, vomiting, abdominal pain, chest pain, or

slurred speech.  Denies any fall or head trauma.  Denies any bowel or

urinary incontinence.  Denies any recent seizure activity.



PHYSICAL EXAMINATION:

VITAL SIGNS:  On admission, temperature 98.1, pulse of 88, respiratory rate

20, and blood pressure 125/66.

GENERAL:  The patient is awake, slow response, which is abnormal for the

patient.  Usually, he is very sharp and responsive.

HEAD AND NECK:  Pupils are equal and reactive to light.  Extraocular

movements intact.  Neck was supple.  No JVD.

LUNGS:  Good air entry with no wheezing or rales.  Decreased in bases.

HEART:  S1, S2.  Regular rhythm.  No gallops.

ABDOMEN:  Soft, nondistended and nontender, mildly obese.  No rebound

tenderness.  No fluid shift.

EXTREMITIES:  No cyanosis, clubbing, or edema.

NEUROLOGIC:  Cranial nerves II through XII are grossly intact.  The patient

is moving all the extremities.  Lower extremities are weaker than upper

extremities.  No focal weakness was identified.

PSYCHIATRIC:  Mood and affect is intact.

RECTAL/GENITOURINARY:  Refused and deferred.



LABORATORY AND DIAGNOSTIC DATA:  Laboratory on admission from the emergency

department, WBC of 5.1, hemoglobin 14, hematocrit 40, and platelets 216.

Sodium 146, potassium 3.8, chloride 109, bicarb 32, BUN 20, creatinine

0.9, glucose is 95, GFR greater than 60, calcium 9.8, alkaline phosphatase

206, total protein is 7.0, albumin is 3.0.  PT of 11, INR 1.1, PTT of 28.

Urinalysis +4 protein, negative glucose, +2 ketone, negative nitrite, +3

leukocytes, many bacteria, _____ WBC.  Chest x-ray, no acute

cardiopulmonary disease.  CT of the head, age-related changes, a small

area of asymptomatic volume loss in the right temporal lobe, likely an old

infarction.  No acute intracranial abnormality.



ASSESSMENT:

1. Sepsis secondary to acute urinary tract infection.

2. Dehydration.

3. Acute kidney injury.

4. Altered mental status, most likely secondary to toxic metabolic

encephalopathy as a result of dehydration and infection.

5. Seizure disorder.

6. COPD.

7. BPH.

8. Migrainous headaches.



PLAN:  Admit the patient to medical floor.  Start the patient on

broad-spectrum antibiotic, Rocephin.  Code status is Full Code.  DVT

prophylaxis with heparin subcutaneous.  Follow up with Dr. Victor Hugo Marie,

consultation from Pulmonary, Dr. Corrie Fletcher from Infectious Disease.

Start the patient on IV hydration.  Follow up with cultures and laboratory

in the morning.  Code status is Full Code.  DVT prophylaxis, heparin

subcutaneous.









  ______________________________________________

  Aron Padron M.D. DR:  Evan

D:  01/29/2021 18:52

T:  02/01/2021 06:38

JOB#:  [id number]

CC:

## 2021-02-01 NOTE — NUR
RD ASSESSMENT & RECOMMENDATIONS

SEE CARE ACTIVITY FOR COMPLETE ASSESSMENT



DAILY ESTIMATED NEEDS:

Needs based on Pulmonary 58kg  

25-35  kcals/kg 

[id number]  total kcals

1-1.5  g protein/kg

58-87  g total protein 

25-30  mL/kg

6520-5698  total fluid mLs



NUTRITION DIAGNOSIS:

Increased kcal needs r/t Low BMI as evidenced by BMI 18.4, underweight per

guidelines 77% of Ideal Body Weight.





CURRENT DIET: Regular ms chopped    



 

PO DIET RECOMMENDATIONS:

Maintain regular/ texture as tolerated  



 



ADDITIONAL RECOMMENDATIONS:

1) Monitor po intake, rec SLP eval- possible upgrade?  

2) Obtain a standing weight as able or calibrated bed scale  

3) Check lytes daily, replete as needed  

4) Add Ensure Enlive BID  

5) MVI x 1 

.

## 2021-02-02 VITALS — SYSTOLIC BLOOD PRESSURE: 129 MMHG | DIASTOLIC BLOOD PRESSURE: 75 MMHG

## 2021-02-02 VITALS — DIASTOLIC BLOOD PRESSURE: 80 MMHG | SYSTOLIC BLOOD PRESSURE: 135 MMHG

## 2021-02-02 VITALS — DIASTOLIC BLOOD PRESSURE: 71 MMHG | SYSTOLIC BLOOD PRESSURE: 115 MMHG

## 2021-02-02 VITALS — DIASTOLIC BLOOD PRESSURE: 70 MMHG | SYSTOLIC BLOOD PRESSURE: 117 MMHG

## 2021-02-02 VITALS — DIASTOLIC BLOOD PRESSURE: 60 MMHG | SYSTOLIC BLOOD PRESSURE: 113 MMHG

## 2021-02-02 VITALS — DIASTOLIC BLOOD PRESSURE: 72 MMHG | SYSTOLIC BLOOD PRESSURE: 135 MMHG

## 2021-02-02 LAB
ADD MANUAL DIFF: NO
ANION GAP SERPL CALC-SCNC: 9 MMOL/L (ref 5–15)
BASOPHILS NFR BLD AUTO: 0.5 % (ref 0–2)
BUN SERPL-MCNC: 8 MG/DL (ref 7–18)
CALCIUM SERPL-MCNC: 9 MG/DL (ref 8.5–10.1)
CHLORIDE SERPL-SCNC: 103 MMOL/L (ref 98–107)
CO2 SERPL-SCNC: 26 MMOL/L (ref 21–32)
CREAT SERPL-MCNC: 0.6 MG/DL (ref 0.55–1.3)
EOSINOPHIL NFR BLD AUTO: 1.3 % (ref 0–3)
ERYTHROCYTE [DISTWIDTH] IN BLOOD BY AUTOMATED COUNT: 15.8 % (ref 11.6–14.8)
HCT VFR BLD CALC: 39 % (ref 42–52)
HGB BLD-MCNC: 13.2 G/DL (ref 14.2–18)
LYMPHOCYTES NFR BLD AUTO: 22.9 % (ref 20–45)
MCV RBC AUTO: 96 FL (ref 80–99)
MONOCYTES NFR BLD AUTO: 9.4 % (ref 1–10)
NEUTROPHILS NFR BLD AUTO: 65.9 % (ref 45–75)
PLATELET # BLD: 241 K/UL (ref 150–450)
POTASSIUM SERPL-SCNC: 3.8 MMOL/L (ref 3.5–5.1)
RBC # BLD AUTO: 4.07 M/UL (ref 4.7–6.1)
SODIUM SERPL-SCNC: 138 MMOL/L (ref 136–145)
WBC # BLD AUTO: 5.8 K/UL (ref 4.8–10.8)

## 2021-02-02 RX ADMIN — PHENYTOIN SODIUM SCH MG: 100 CAPSULE, EXTENDED RELEASE ORAL at 20:08

## 2021-02-02 RX ADMIN — TAMSULOSIN HYDROCHLORIDE SCH MG: 0.4 CAPSULE ORAL at 20:08

## 2021-02-02 RX ADMIN — DOCUSATE SODIUM SCH MG: 100 CAPSULE, LIQUID FILLED ORAL at 17:08

## 2021-02-02 RX ADMIN — LATANOPROST SCH DROP: 50 SOLUTION OPHTHALMIC at 20:08

## 2021-02-02 RX ADMIN — DOCUSATE SODIUM SCH MG: 100 CAPSULE, LIQUID FILLED ORAL at 08:30

## 2021-02-02 RX ADMIN — DEXTROSE, SODIUM CHLORIDE, AND POTASSIUM CHLORIDE SCH MLS/HR: 5; .45; .15 INJECTION INTRAVENOUS at 12:18

## 2021-02-02 NOTE — INTERNAL MED PROGRESS NOTE
Subjective


Date of Service:  Feb 2, 2021


Physician Name


Dominic Temple


Attending Physician


Aron Padron MD





Current Medications








 Medications


  (Trade)  Dose


 Ordered  Sig/Eddie


 Route


 PRN Reason  Start Time


 Stop Time Status Last Admin


Dose Admin


 


 Acetaminophen


  (Tylenol)  325 mg  Q4H  PRN


 ORAL


 For Pain  1/29/21 00:30


 2/28/21 00:29  2/2/21 12:27





 


 Dextrose/


 Electrolytes  1,000 ml @ 


 75 mls/hr  [id number]


 IV


   1/29/21 02:00


 2/28/21 01:59  2/2/21 12:18





 


 Docusate Sodium


  (Colace)  100 mg  TWICE A  DAY


 ORAL


   2/1/21 18:00


 3/3/21 17:59  2/2/21 17:08





 


 Latanoprost


  (Xalatan)  1 drop  BEDTIME


 BOTH EYES


   1/29/21 21:00


 2/28/21 20:59  2/1/21 20:27





 


 Pantoprazole


  (Protonix)  40 mg  DAILY


 ORAL


   1/29/21 09:00


 2/28/21 08:59  2/2/21 08:30





 


 Phenytoin


  (Dilantin)  300 mg  BEDTIME


 ORAL


   1/29/21 21:00


 2/28/21 20:59  2/1/21 20:26





 


 Polyethylene


 Glycol


  (Miralax)  17 gm  DAILYPRN  PRN


 ORAL


 Constipation  2/1/21 12:00


 3/3/21 11:59  2/1/21 13:59





 


 Primidone


  (Mysoline)  250 mg  TID


 ORAL


   1/29/21 09:00


 2/28/21 08:59  2/2/21 17:08





 


 Sumatriptan


 Succinate


  (Imitrex)  100 mg  TWICE A DAY  PRN


 ORAL


 For Headache  1/29/21 00:15


 2/28/21 00:14  1/31/21 20:11





 


 Tamsulosin HCl


  (Flomax)  0.4 mg  BEDTIME


 ORAL


   1/29/21 01:00


 2/28/21 00:59  2/1/21 20:26











Allergies:  


Coded Allergies:  


     SHELLFISH DERIVED (Unverified  Allergy, Intermediate, ITCHING/HIVES, 

7/25/14)


     IODINE (Unverified  Allergy, Unknown, 7/25/14)


Uncoded Allergies:  


     SPINACH (Allergy, Intermediate, SEVERE ITCHING/HIVES, 7/25/14)


ROS Limited/Unobtainable:  Yes


Subjective


69 YO M admitted with altered mental status.  Now UTI and sepsis.  Cover for Int

David - Dr Padron





Objective





Last Vital Signs








  Date Time  Temp Pulse Resp B/P (MAP) Pulse Ox O2 Delivery O2 Flow Rate FiO2


 


2/2/21 16:00 97.7 86 20 117/70 (86) 94   


 


2/2/21 09:00      Room Air  


 


1/28/21 21:56        100











Laboratory Tests








Test


 2/2/21


05:50


 


White Blood Count


 5.8 K/UL


(4.8-10.8)


 


Red Blood Count


 4.07 M/UL


(4.70-6.10)  L


 


Hemoglobin


 13.2 G/DL


(14.2-18.0)  L


 


Hematocrit


 39.0 %


(42.0-52.0)  L


 


Mean Corpuscular Volume 96 FL (80-99)  


 


Mean Corpuscular Hemoglobin


 32.5 PG


(27.0-31.0)  H


 


Mean Corpuscular Hemoglobin


Concent 33.8 G/DL


(32.0-36.0)


 


Red Cell Distribution Width


 15.8 %


(11.6-14.8)  H


 


Platelet Count


 241 K/UL


(150-450)


 


Mean Platelet Volume


 6.8 FL


(6.5-10.1)


 


Neutrophils (%) (Auto)


 65.9 %


(45.0-75.0)


 


Lymphocytes (%) (Auto)


 22.9 %


(20.0-45.0)


 


Monocytes (%) (Auto)


 9.4 %


(1.0-10.0)


 


Eosinophils (%) (Auto)


 1.3 %


(0.0-3.0)


 


Basophils (%) (Auto)


 0.5 %


(0.0-2.0)


 


Sodium Level


 138 MMOL/L


(136-145)


 


Potassium Level


 3.8 MMOL/L


(3.5-5.1)


 


Chloride Level


 103 MMOL/L


()


 


Carbon Dioxide Level


 26 MMOL/L


(21-32)


 


Anion Gap


 9 mmol/L


(5-15)


 


Blood Urea Nitrogen


 8 mg/dL (7-18)





 


Creatinine


 0.6 MG/DL


(0.55-1.30)


 


Estimat Glomerular Filtration


Rate > 60 mL/min


(>60)


 


Glucose Level


 116 MG/DL


()  H


 


Calcium Level


 9.0 MG/DL


(8.5-10.1)

















Intake and Output  


 


 2/1/21 2/2/21





 19:00 07:00


 


Intake Total 820 ml 1065 ml


 


Output Total 400 ml 


 


Balance 420 ml 1065 ml


 


  


 


Intake Oral 240 ml 240 ml


 


IV Total 580 ml 825 ml


 


Output Urine Total 400 ml 


 


# Voids 3 3








Objective


Objective


General: No acute distress, awake and alert


HEENT: NCAT, sclera anicteric, PERRL, EOMI.


Neck: Supple, no significant jugular venous distention, 


Lungs: fair inspiratory effort, decreased air in the bases, no Wheeze or Rales.


Heart: Regular rate and rhythm, normal S1/S2, no murmurs.


Abdomen: soft, nontender, nondistended. Normoactive bowel sounds.


 / Rectal: Refused and deferred.


Extremities: No Cyanosis , clubbing or edema. 


Neuro: A&O x 3, Able to move all extremities, bilateral lower extremity weakness


Skin: warm, no rash.


Psych: Normal mood and affect





Assessment/Plan


altered mental status


toxic metabolic encephalopathy


urinary tract infection=Aerococcus urinae


 dehydration.


Sepsis secondary to urinary tract infection.


Dehydration.


Acute kidney injury.


Seizure disorder.


COPD.


History of hepatitis B+


BPH








Plan:


Antibiotics: Rocephin


CODE STATUS full code.


DVT prophylaxis heparin subcu.


Follow-up with infection disease recommendation.


PT mobility consultation.


Inf Dis=Dr. Corrie Fletcher


Pulmonary=Dr. Marie


Discharge planning:  [facility]











Dominic Temple MD                Feb 2, 2021 18:24

## 2021-02-02 NOTE — NUR
***insurance***



CLINICALS/REVIEW  FAXED TO [employer]

  [phone] 

TELE [phone]



[facility]

 [phone]  and [phone]

TELE [phone]

## 2021-02-02 NOTE — INFECTIOUS DISEASES PROG NOTE
Assessment/Plan





69yo M with:





Afebrile


Normal WBC


Satting well on RA


UTI, although asymptomatic


R/o infection


Weakness, failure to thrive


   1/28 BCx NTD


   COVID PCR neg


   UA+, UCx >100k Aerococcus urinae


   CXR: No acute process


   CTH: Neg for acute process





H/o HBV - per pt doesn't have it anymore


Cirrhosis


Elevated alk phos to 200s


   Abd US: COARSE HEPATIC ECHOTEXTURE WHICH MAY BE RELATED THE PATIENT'S HISTORY

OF HEPATITIS B. NO RAFFAELE CIRRHOSIS OR SPACE-OCCUPYING LESION SEEN PRESENTLY.





HIV screen neg





PMH:


COPD


CVA


Seizure d/o


HBV c/b cirrhosis





Plan:


Stop CTX 1g IV daily #5/5 for UTI


OK to d/c from ID standpoint





F/u HBV Viral load, HBV serologies


F/u HCV Ab screen





Monitor CBC/CMP


Monitor temp curve, hemodynamics


Monitor resp status





D/w RN





Thank you for this consult. [facility] will continue to follow.





Subjective


Allergies:  


Coded Allergies:  


     SHELLFISH DERIVED (Unverified  Allergy, Intermediate, ITCHING/HIVES, 

7/25/14)


     IODINE (Unverified  Allergy, Unknown, 7/25/14)


Uncoded Allergies:  


     SPINACH (Allergy, Intermediate, SEVERE ITCHING/HIVES, 7/25/14)


AF


NAD on RA


WBC 5.8





Objective





Last 24 Hour Vital Signs








  Date Time  Temp Pulse Resp B/P (MAP) Pulse Ox O2 Delivery O2 Flow Rate FiO2


 


2/2/21 09:00      Room Air  


 


2/2/21 08:00 97.4 81 20 135/80 (98) 96   


 


2/2/21 04:00 97.5 77 16 115/71 (86) 94   


 


2/2/21 00:00 97.2 77 16 113/60 (77) 93   


 


2/1/21 21:00      Room Air  


 


2/1/21 20:00 97.1 86 18 116/66 (83) 94   


 


2/1/21 16:00 98.1 76 20 114/75 (88) 99   


 


2/1/21 12:00 98.0 74 18 123/76 (92) 93   








Height (Feet):  5


Height (Inches):  10.00


Weight (Pounds):  128


Gen: NAD


HEENT: NCAT


Pulm: BL chest rise 


Abd: Non-distended


Ext: No c/c/e


Skin: No visible rashes


Neuro: Awake





Laboratory Tests








Test


 2/2/21


05:50


 


White Blood Count


 5.8 K/UL


(4.8-10.8)


 


Red Blood Count


 4.07 M/UL


(4.70-6.10)  L


 


Hemoglobin


 13.2 G/DL


(14.2-18.0)  L


 


Hematocrit


 39.0 %


(42.0-52.0)  L


 


Mean Corpuscular Volume 96 FL (80-99)  


 


Mean Corpuscular Hemoglobin


 32.5 PG


(27.0-31.0)  H


 


Mean Corpuscular Hemoglobin


Concent 33.8 G/DL


(32.0-36.0)


 


Red Cell Distribution Width


 15.8 %


(11.6-14.8)  H


 


Platelet Count


 241 K/UL


(150-450)


 


Mean Platelet Volume


 6.8 FL


(6.5-10.1)


 


Neutrophils (%) (Auto)


 65.9 %


(45.0-75.0)


 


Lymphocytes (%) (Auto)


 22.9 %


(20.0-45.0)


 


Monocytes (%) (Auto)


 9.4 %


(1.0-10.0)


 


Eosinophils (%) (Auto)


 1.3 %


(0.0-3.0)


 


Basophils (%) (Auto)


 0.5 %


(0.0-2.0)


 


Sodium Level


 138 MMOL/L


(136-145)


 


Potassium Level


 3.8 MMOL/L


(3.5-5.1)


 


Chloride Level


 103 MMOL/L


()


 


Carbon Dioxide Level


 26 MMOL/L


(21-32)


 


Anion Gap


 9 mmol/L


(5-15)


 


Blood Urea Nitrogen


 8 mg/dL (7-18)





 


Creatinine


 0.6 MG/DL


(0.55-1.30)


 


Estimat Glomerular Filtration


Rate > 60 mL/min


(>60)


 


Glucose Level


 116 MG/DL


()  H


 


Calcium Level


 9.0 MG/DL


(8.5-10.1)











Current Medications








 Medications


  (Trade)  Dose


 Ordered  Sig/Eddie


 Route


 PRN Reason  Start Time


 Stop Time Status Last Admin


Dose Admin


 


 Acetaminophen


  (Tylenol)  325 mg  Q4H  PRN


 ORAL


 For Pain  1/29/21 00:30


 2/28/21 00:29  2/1/21 01:32





 


 Ceftriaxone


 Sodium 1 gm/


 Dextrose  55 ml @ 


 110 mls/hr  Q24H


 IVPB


   1/29/21 18:00


 2/5/21 17:59  2/1/21 17:48





 


 Dextrose/


 Electrolytes  1,000 ml @ 


 75 mls/hr  [id number]


 IV


   1/29/21 02:00


 2/28/21 01:59  2/1/21 23:39





 


 Docusate Sodium


  (Colace)  100 mg  TWICE A  DAY


 ORAL


   2/1/21 18:00


 3/3/21 17:59  2/2/21 08:30





 


 Latanoprost


  (Xalatan)  1 drop  BEDTIME


 BOTH EYES


   1/29/21 21:00


 2/28/21 20:59  2/1/21 20:27





 


 Pantoprazole


  (Protonix)  40 mg  DAILY


 ORAL


   1/29/21 09:00


 2/28/21 08:59  2/2/21 08:30





 


 Phenytoin


  (Dilantin)  300 mg  BEDTIME


 ORAL


   1/29/21 21:00


 2/28/21 20:59  2/1/21 20:26





 


 Polyethylene


 Glycol


  (Miralax)  17 gm  DAILYPRN  PRN


 ORAL


 Constipation  2/1/21 12:00


 3/3/21 11:59  2/1/21 13:59





 


 Primidone


  (Mysoline)  250 mg  TID


 ORAL


   1/29/21 09:00


 2/28/21 08:59  2/2/21 08:30





 


 Sumatriptan


 Succinate


  (Imitrex)  100 mg  TWICE A DAY  PRN


 ORAL


 For Headache  1/29/21 00:15


 2/28/21 00:14  1/31/21 20:11





 


 Tamsulosin HCl


  (Flomax)  0.4 mg  BEDTIME


 ORAL


   1/29/21 01:00


 2/28/21 00:59  2/1/21 20:26




















Corrie Fletcher M.D.                Feb 2, 2021 09:09

## 2021-02-02 NOTE — NUR
NURSE NOTES:

Received report from TERESITA Gong. AAO x 2-3, resting in bed, on RA. Denies pain or discomfort. 
IV sites intact and patent. Condom cath running well. No acute distress noted. Bed locked, 
lowest position, alarm on, side rails up, call light within reach. Will continue to monitor.

## 2021-02-02 NOTE — SURGERY PROGRESS NOTE
Surgery Progress Note


Subjective


Additional Comments


states he is well


wants to go home


consider placement snf





Objective





Last 24 Hour Vital Signs








  Date Time  Temp Pulse Resp B/P (MAP) Pulse Ox O2 Delivery O2 Flow Rate FiO2


 


2/2/21 12:57 97.4       


 


2/2/21 12:00 97.3 80 20 129/75 (93) 94   


 


2/2/21 09:00      Room Air  


 


2/2/21 08:00 97.4 81 20 135/80 (98) 96   


 


2/2/21 04:00 97.5 77 16 115/71 (86) 94   


 


2/2/21 00:00 97.2 77 16 113/60 (77) 93   


 


2/1/21 21:00      Room Air  


 


2/1/21 20:00 97.1 86 18 116/66 (83) 94   


 


2/1/21 16:00 98.1 76 20 114/75 (88) 99   








I&O











Intake and Output  


 


 2/1/21 2/2/21





 19:00 07:00


 


Intake Total 820 ml 1065 ml


 


Output Total 400 ml 


 


Balance 420 ml 1065 ml


 


  


 


Intake Oral 240 ml 240 ml


 


IV Total 580 ml 825 ml


 


Output Urine Total 400 ml 


 


# Voids 3 3








Dressing:  saturated


Cardiovascular:  RSR


Respiratory:  decreased breath sounds


Abdomen:  non-tender, present bowel sounds


Extremities:  no edema, no tenderness, no cyanosis





Laboratory Tests








Test


 2/2/21


05:50


 


White Blood Count


 5.8 K/UL


(4.8-10.8)


 


Red Blood Count


 4.07 M/UL


(4.70-6.10)  L


 


Hemoglobin


 13.2 G/DL


(14.2-18.0)  L


 


Hematocrit


 39.0 %


(42.0-52.0)  L


 


Mean Corpuscular Volume 96 FL (80-99)  


 


Mean Corpuscular Hemoglobin


 32.5 PG


(27.0-31.0)  H


 


Mean Corpuscular Hemoglobin


Concent 33.8 G/DL


(32.0-36.0)


 


Red Cell Distribution Width


 15.8 %


(11.6-14.8)  H


 


Platelet Count


 241 K/UL


(150-450)


 


Mean Platelet Volume


 6.8 FL


(6.5-10.1)


 


Neutrophils (%) (Auto)


 65.9 %


(45.0-75.0)


 


Lymphocytes (%) (Auto)


 22.9 %


(20.0-45.0)


 


Monocytes (%) (Auto)


 9.4 %


(1.0-10.0)


 


Eosinophils (%) (Auto)


 1.3 %


(0.0-3.0)


 


Basophils (%) (Auto)


 0.5 %


(0.0-2.0)


 


Sodium Level


 138 MMOL/L


(136-145)


 


Potassium Level


 3.8 MMOL/L


(3.5-5.1)


 


Chloride Level


 103 MMOL/L


()


 


Carbon Dioxide Level


 26 MMOL/L


(21-32)


 


Anion Gap


 9 mmol/L


(5-15)


 


Blood Urea Nitrogen


 8 mg/dL (7-18)





 


Creatinine


 0.6 MG/DL


(0.55-1.30)


 


Estimat Glomerular Filtration


Rate > 60 mL/min


(>60)


 


Glucose Level


 116 MG/DL


()  H


 


Calcium Level


 9.0 MG/DL


(8.5-10.1)











Plan


Problems:  


(1) Abnormal LFTs


Assessment & Plan:  70 year old male with history of hepatitis comes in for 

weakness noted to have abnormal lft's.  


hx hepatitis


alk phos elevated


US noted


The liver demonstrates diffuse coarse hepatic echotexture. No ductal dilatation


noted. Spleen is unremarkable. The gallbladder is without sludge or stone. 

Common


bile duct measures 2 mm. The pancreas, aorta and cava are grossly unremarkable 

to the


extent visualized appear  The kidneys are normal in size, shape and axis.  No 

ascites


noted.


okay or diet


will monitor with exam


trend labs


thank you 











DAILY ESTIMATED NEEDS:


Needs based on Pulmonary 58kg  


25-35  kcals/kg 


0933-1736  total kcals


1-1.5  g protein/kg


58-87  g total protein 


25-30  mL/kg


4744-2335  total fluid mLs





NUTRITION DIAGNOSIS:


Increased kcal needs r/t Low BMI as evidenced by BMI 18.4, underweight per


guidelines 77% of Ideal Body Weight.








CURRENT DIET: Regular ms chopped    





 


PO DIET RECOMMENDATIONS:


Maintain regular/ texture as tolerated  





 





ADDITIONAL RECOMMENDATIONS:


1) Monitor po intake, rec SLP eval- possible upgrade?  


2) Obtain a standing weight as able or calibrated bed scale  


3) Check lytes daily, replete as needed  


4) Add Ensure Enlive BID  


5) MVI x 1 





(2) Encephalopathy


(3) Weakness


(4) UTI (urinary tract infection)


(5) Altered mental status


(6) Falls frequently


(7) Cerebellar atrophy


(8) Hip fracture, intertrochanteric


(9) Seizure disorder, complex partial


(10) Pulmonary embolism


(11) ACS (acute coronary syndrome)


(12) Multiple injuries due to trauma











Ricardo Pineda                 Feb 2, 2021 14:25

## 2021-02-02 NOTE — NUR
NURSE HAND-OFF: 



Important Events on Shift: IV hydration

Patient Status: Stable

Diet: Regular mechanical chopped



Pending Orders: []

Pending Results/Labs:[]

Pending MD notification:[]



Latest Vital Signs: Temperature 97.5 , Pulse 77 , B/P 115 /71 , Respiratory Rate 16 , O2 SAT 
94 , Room Air, O2 Flow Rate .  

Vital Sign Comment: VS stable



Latest Copeland Fall Score: 95  

Fall Risk: High Risk 

Safety Measures: Call light Within Reach, Bed Alarm Zone 1, Side Rails Side Rails x3, Bed 
position Low and Locked.

Fall Precautions: 

Yellow Socks

Yellow Gown

Door Sign



Report given to Farideh LO.

## 2021-02-02 NOTE — NUR
CASE MANAGEMENT:REVIEW



2/2/21

SI: ENCEPHALOPATHY. SEPSIS D/T  UTI

97.3   80  20  129/75   94% ON RA  

H/H-13.2/39.0   



IS: 

IVF@75/HR

COLACE PO BID

DILANTIN PO QHS

PROTONIX PO QD

MYSOLINE PO TID

FLOMAX PO QHS

**MED/SURG STATUS

DCP: FROM HOME



PLAN:

MD IS RECOMMENDING SNF

## 2021-02-02 NOTE — PULMONOLOGY PROGRESS NOTE
Subjective


ROS Limited/Unobtainable:  Yes


Interval Events:  none major reported per nursing


Constitutional:  Reports: no symptoms


HEENT:  Repors: no symptoms


Respiratory:  Reports: no symptoms


Cardiovascular:  Reports: no symptoms


Gastrointestinal/Abdominal:  Reports: no symptoms


Allergies:  


Coded Allergies:  


     SHELLFISH DERIVED (Unverified  Allergy, Intermediate, ITCHING/HIVES, 

7/25/14)


     IODINE (Unverified  Allergy, Unknown, 7/25/14)


Uncoded Allergies:  


     SPINACH (Allergy, Intermediate, SEVERE ITCHING/HIVES, 7/25/14)





Objective





Last 24 Hour Vital Signs








  Date Time  Temp Pulse Resp B/P (MAP) Pulse Ox O2 Delivery O2 Flow Rate FiO2


 


2/2/21 09:00      Room Air  


 


2/2/21 08:00 97.4 81 20 135/80 (98) 96   


 


2/2/21 04:00 97.5 77 16 115/71 (86) 94   


 


2/2/21 00:00 97.2 77 16 113/60 (77) 93   


 


2/1/21 21:00      Room Air  


 


2/1/21 20:00 97.1 86 18 116/66 (83) 94   


 


2/1/21 16:00 98.1 76 20 114/75 (88) 99   


 


2/1/21 12:00 98.0 74 18 123/76 (92) 93   

















Intake and Output  


 


 2/1/21 2/2/21





 19:00 07:00


 


Intake Total 820 ml 1065 ml


 


Output Total 400 ml 


 


Balance 420 ml 1065 ml


 


  


 


Intake Oral 240 ml 240 ml


 


IV Total 580 ml 825 ml


 


Output Urine Total 400 ml 


 


# Voids 3 3








General Appearance:  no acute distress


HEENT:  atraumatic


Respiratory:  lungs clear


Abdomen:  soft, non tender


Laboratory Tests


2/2/21 05:50: 


White Blood Count 5.8, Red Blood Count 4.07L, Hemoglobin 13.2L, Hematocrit 39.0L

, Mean Corpuscular Volume 96, Mean Corpuscular Hemoglobin 32.5H, Mean 

Corpuscular Hemoglobin Concent 33.8, Red Cell Distribution Width 15.8H, Platelet

Count 241, Mean Platelet Volume 6.8, Neutrophils (%) (Auto) 65.9, Lymphocytes 

(%) (Auto) 22.9, Monocytes (%) (Auto) 9.4, Eosinophils (%) (Auto) 1.3, Basophils

(%) (Auto) 0.5, Sodium Level 138, Potassium Level 3.8, Chloride Level 103, 

Carbon Dioxide Level 26, Anion Gap 9, Blood Urea Nitrogen 8, Creatinine 0.6, 

Estimat Glomerular Filtration Rate > 60, Glucose Level 116H, Calcium Level 9.0





Current Medications








 Medications


  (Trade)  Dose


 Ordered  Sig/Eddie


 Route


 PRN Reason  Start Time


 Stop Time Status Last Admin


Dose Admin


 


 Acetaminophen


  (Tylenol)  325 mg  Q4H  PRN


 ORAL


 For Pain  1/29/21 00:30


 2/28/21 00:29  2/1/21 01:32





 


 Dextrose/


 Electrolytes  1,000 ml @ 


 75 mls/hr  [id number]


 IV


   1/29/21 02:00


 2/28/21 01:59  2/1/21 23:39





 


 Docusate Sodium


  (Colace)  100 mg  TWICE A  DAY


 ORAL


   2/1/21 18:00


 3/3/21 17:59  2/2/21 08:30





 


 Latanoprost


  (Xalatan)  1 drop  BEDTIME


 BOTH EYES


   1/29/21 21:00


 2/28/21 20:59  2/1/21 20:27





 


 Pantoprazole


  (Protonix)  40 mg  DAILY


 ORAL


   1/29/21 09:00


 2/28/21 08:59  2/2/21 08:30





 


 Phenytoin


  (Dilantin)  300 mg  BEDTIME


 ORAL


   1/29/21 21:00


 2/28/21 20:59  2/1/21 20:26





 


 Polyethylene


 Glycol


  (Miralax)  17 gm  DAILYPRN  PRN


 ORAL


 Constipation  2/1/21 12:00


 3/3/21 11:59  2/1/21 13:59





 


 Primidone


  (Mysoline)  250 mg  TID


 ORAL


   1/29/21 09:00


 2/28/21 08:59  2/2/21 08:30





 


 Sumatriptan


 Succinate


  (Imitrex)  100 mg  TWICE A DAY  PRN


 ORAL


 For Headache  1/29/21 00:15


 2/28/21 00:14  1/31/21 20:11





 


 Tamsulosin HCl


  (Flomax)  0.4 mg  BEDTIME


 ORAL


   1/29/21 01:00


 2/28/21 00:59  2/1/21 20:26














Assessment/Plan


Assessment/Plan


1. History of COPD


   -Patient denies using home oxygen


   -Currently saturating well on room air


   -Monitor for hypoxia and provide supplemental oxygen as needed


2. COVID-19 PCR negative


   - off isolation


3. History of hepatitis B


   -Per patient, he does not have it anymore


   -Abdominal US shows coarse hepatic echotexture.  No cirrhosis or space-

occupying lesion seen.


   - ID following


   - hep panel pending


4. Aerococcus UTI


   -s/p Rocephin for possible UTI per ID


   - ID following


5. DVT ppx 


   - on Heparin subcu





PT recommends placement to SNF due to weakness


Medically stable from pulmonary standpoint


Recommend dc to SNF, pt very unsteady





The care for this patient was discussed with my supervising physician.


Time spent for this case was approximately 31 minutes.











Teo Jay                  Feb 2, 2021 10:20

## 2021-02-02 NOTE — NUR
NURSE NOTES:

Given report to Salma LO, patient in stable condition. 

- plan to d/c to SNF once bed becomes available.



DPOA: Jorgito Contreras: [phone]

- per DPOA patient lives alone in an apartment, prior to this admission pt had a caregiver 
24/7 but per DPOA they wont be able to pay for another caregiver upon d/c. pt needs 
placement per PT rec.

- pt is confused and unable to ambulate.

## 2021-02-02 NOTE — NUR
NURSE NOTES:

Received report from Cassius RN, patient laying in bed with no signs of distress or other 
issues at this time. IV on the right AC gauge#22  HL, and right Hand  gauge#22 running D5 
1/2 NS +20mEq@75ml/hr. call light within reach, bed in lowest position, side rales up x2.I 
will f/u as needed.

## 2021-02-03 VITALS — SYSTOLIC BLOOD PRESSURE: 119 MMHG | DIASTOLIC BLOOD PRESSURE: 68 MMHG

## 2021-02-03 VITALS — SYSTOLIC BLOOD PRESSURE: 138 MMHG | DIASTOLIC BLOOD PRESSURE: 72 MMHG

## 2021-02-03 VITALS — SYSTOLIC BLOOD PRESSURE: 101 MMHG | DIASTOLIC BLOOD PRESSURE: 66 MMHG

## 2021-02-03 VITALS — DIASTOLIC BLOOD PRESSURE: 68 MMHG | SYSTOLIC BLOOD PRESSURE: 107 MMHG

## 2021-02-03 VITALS — SYSTOLIC BLOOD PRESSURE: 131 MMHG | DIASTOLIC BLOOD PRESSURE: 80 MMHG

## 2021-02-03 VITALS — DIASTOLIC BLOOD PRESSURE: 70 MMHG | SYSTOLIC BLOOD PRESSURE: 132 MMHG

## 2021-02-03 LAB
ADD MANUAL DIFF: NO
ANION GAP SERPL CALC-SCNC: 8 MMOL/L (ref 5–15)
BASOPHILS NFR BLD AUTO: 0.3 % (ref 0–2)
BUN SERPL-MCNC: 11 MG/DL (ref 7–18)
CALCIUM SERPL-MCNC: 9.1 MG/DL (ref 8.5–10.1)
CHLORIDE SERPL-SCNC: 102 MMOL/L (ref 98–107)
CO2 SERPL-SCNC: 27 MMOL/L (ref 21–32)
CREAT SERPL-MCNC: 0.6 MG/DL (ref 0.55–1.3)
EOSINOPHIL NFR BLD AUTO: 0.1 % (ref 0–3)
ERYTHROCYTE [DISTWIDTH] IN BLOOD BY AUTOMATED COUNT: 15.5 % (ref 11.6–14.8)
HCT VFR BLD CALC: 38.7 % (ref 42–52)
HGB BLD-MCNC: 13.1 G/DL (ref 14.2–18)
LYMPHOCYTES NFR BLD AUTO: 12.3 % (ref 20–45)
MCV RBC AUTO: 96 FL (ref 80–99)
MONOCYTES NFR BLD AUTO: 7.1 % (ref 1–10)
NEUTROPHILS NFR BLD AUTO: 80.2 % (ref 45–75)
PLATELET # BLD: 258 K/UL (ref 150–450)
POTASSIUM SERPL-SCNC: 4 MMOL/L (ref 3.5–5.1)
RBC # BLD AUTO: 4.02 M/UL (ref 4.7–6.1)
SODIUM SERPL-SCNC: 137 MMOL/L (ref 136–145)
WBC # BLD AUTO: 10.5 K/UL (ref 4.8–10.8)

## 2021-02-03 RX ADMIN — TAMSULOSIN HYDROCHLORIDE SCH MG: 0.4 CAPSULE ORAL at 20:46

## 2021-02-03 RX ADMIN — DOCUSATE SODIUM SCH MG: 100 CAPSULE, LIQUID FILLED ORAL at 18:22

## 2021-02-03 RX ADMIN — DOCUSATE SODIUM SCH MG: 100 CAPSULE, LIQUID FILLED ORAL at 08:31

## 2021-02-03 RX ADMIN — DEXTROSE, SODIUM CHLORIDE, AND POTASSIUM CHLORIDE SCH MLS/HR: 5; .45; .15 INJECTION INTRAVENOUS at 15:00

## 2021-02-03 RX ADMIN — PHENYTOIN SODIUM SCH MG: 100 CAPSULE, EXTENDED RELEASE ORAL at 20:47

## 2021-02-03 RX ADMIN — LATANOPROST SCH DROP: 50 SOLUTION OPHTHALMIC at 20:46

## 2021-02-03 RX ADMIN — DEXTROSE, SODIUM CHLORIDE, AND POTASSIUM CHLORIDE SCH MLS/HR: 5; .45; .15 INJECTION INTRAVENOUS at 01:02

## 2021-02-03 NOTE — NUR
*-*DISCHARGE PLANNING*-*





PATIENT HAS BEEN REFERRED TO:



[facility]

 FAX: [phone]



[facility]

 FAX: [phone]



[facility]

 FAX: [phone]



[facility]

 FAX: [phone]



[facility]

 FAX: [phone]



[facility]

 FAX: [phone]



[facility]

 FAX: [phone]



[facility]

 FAX: [phone]



[facility]

 FAX: [phone]



[facility]

 FAX:  [phone]



[facility]

 FAX: [phone]

## 2021-02-03 NOTE — NUR
NURSE HAND-OFF: 



Important Events on Shift:[]

Patient Status: [STABLE]

Diet: [REG MECH SOFT CHOPPED]



Pending Orders: [N/A]

Pending Results/Labs:[N/A]

Pending MD notification:[N/A]



Latest Vital Signs: Temperature 98.8 , Pulse 70 , B/P 132 /70 , Respiratory Rate 20 , O2 SAT 
92 , Room Air, O2 Flow Rate .  

Vital Sign Comment: [WNL]



Latest Copeland Fall Score: 95  

Fall Risk: High Risk 

Safety Measures: Call light Within Reach, Bed Alarm Zone 1, Side Rails Side Rails x3, Bed 
position Low and Locked.

Fall Precautions: 

Yellow Socks

Yellow Gown

Door Sign



Report given to [].

## 2021-02-03 NOTE — NUR
NURSE NOTES:

Received patient in no apparent distress. A&OX2, confused. IV site patent and intact. Bed in 
lowest position, bed alarm on. Call light within reach. Will continue to monitor.

## 2021-02-03 NOTE — SURGERY PROGRESS NOTE
Surgery Progress Note


Subjective


Symptoms:  improved, tolerating diet, passing flatus





Objective





Last 24 Hour Vital Signs








  Date Time  Temp Pulse Resp B/P (MAP) Pulse Ox O2 Delivery O2 Flow Rate FiO2


 


2/3/21 12:00 97.8 82 18 119/68 (85) 93   


 


2/3/21 09:00      Room Air  


 


2/3/21 08:00 97.4 77 18 101/66 (78) 94   


 


2/3/21 04:00 98.8 70 20 132/70 (90) 92   


 


2/3/21 00:00 98.2 98 20 138/72 (94) 92   


 


2/2/21 21:00      Room Air  


 


2/2/21 20:00 98.4 95 20 135/72 (93) 95   


 


2/2/21 16:00 97.7 86 20 117/70 (86) 94   








I&O











Intake and Output  


 


 2/2/21 2/3/21





 19:00 07:00


 


Intake Total  240 ml


 


Output Total 750 ml 900 ml


 


Balance -750 ml -660 ml


 


  


 


Intake Oral  240 ml


 


Output Urine Total 750 ml 900 ml


 


# Voids 1 








Cardiovascular:  RSR


Respiratory:  clear, decreased breath sounds


Abdomen:  soft, non-tender, present bowel sounds


Extremities:  no edema, no tenderness, no cyanosis





Laboratory Tests








Test


 2/3/21


05:22


 


White Blood Count


 10.5 K/UL


(4.8-10.8)  #


 


Red Blood Count


 4.02 M/UL


(4.70-6.10)  L


 


Hemoglobin


 13.1 G/DL


(14.2-18.0)  L


 


Hematocrit


 38.7 %


(42.0-52.0)  L


 


Mean Corpuscular Volume 96 FL (80-99)  


 


Mean Corpuscular Hemoglobin


 32.7 PG


(27.0-31.0)  H


 


Mean Corpuscular Hemoglobin


Concent 33.9 G/DL


(32.0-36.0)


 


Red Cell Distribution Width


 15.5 %


(11.6-14.8)  H


 


Platelet Count


 258 K/UL


(150-450)


 


Mean Platelet Volume


 7.5 FL


(6.5-10.1)


 


Neutrophils (%) (Auto)


 80.2 %


(45.0-75.0)  H


 


Lymphocytes (%) (Auto)


 12.3 %


(20.0-45.0)  L


 


Monocytes (%) (Auto)


 7.1 %


(1.0-10.0)


 


Eosinophils (%) (Auto)


 0.1 %


(0.0-3.0)


 


Basophils (%) (Auto)


 0.3 %


(0.0-2.0)


 


Sodium Level


 137 MMOL/L


(136-145)


 


Potassium Level


 4.0 MMOL/L


(3.5-5.1)


 


Chloride Level


 102 MMOL/L


()


 


Carbon Dioxide Level


 27 MMOL/L


(21-32)


 


Anion Gap


 8 mmol/L


(5-15)


 


Blood Urea Nitrogen


 11 mg/dL


(7-18)


 


Creatinine


 0.6 MG/DL


(0.55-1.30)


 


Estimat Glomerular Filtration


Rate > 60 mL/min


(>60)


 


Glucose Level


 118 MG/DL


()  H


 


Calcium Level


 9.1 MG/DL


(8.5-10.1)











Plan


Problems:  


(1) Abnormal LFTs


Assessment & Plan:  70 year old male with history of hepatitis comes in for 

weakness noted to have abnormal lft's.  


hx hepatitis


alk phos elevated


US noted


The liver demonstrates diffuse coarse hepatic echotexture. No ductal dilatation


noted. Spleen is unremarkable. The gallbladder is without sludge or stone. 

Common


bile duct measures 2 mm. The pancreas, aorta and cava are grossly unremarkable 

to the


extent visualized appear  The kidneys are normal in size, shape and axis.  No 

ascites


noted.


okay or diet


will monitor with exam


trend labs


thank you 











DAILY ESTIMATED NEEDS:


Needs based on Pulmonary 58kg  


25-35  kcals/kg 


0745-3714  total kcals


1-1.5  g protein/kg


58-87  g total protein 


25-30  mL/kg


8096-5852  total fluid mLs





NUTRITION DIAGNOSIS:


Increased kcal needs r/t Low BMI as evidenced by BMI 18.4, underweight per


guidelines 77% of Ideal Body Weight.








CURRENT DIET: Regular ms chopped    





 


PO DIET RECOMMENDATIONS:


Maintain regular/ texture as tolerated  





 





ADDITIONAL RECOMMENDATIONS:


1) Monitor po intake, rec SLP eval- possible upgrade?  


2) Obtain a standing weight as able or calibrated bed scale  


3) Check lytes daily, replete as needed  


4) Add Ensure Enlive BID  


5) MVI x 1 





(2) Encephalopathy


(3) Weakness


(4) UTI (urinary tract infection)


(5) Altered mental status


(6) Falls frequently


(7) Cerebellar atrophy


(8) Hip fracture, intertrochanteric


(9) Seizure disorder, complex partial


(10) Pulmonary embolism


(11) ACS (acute coronary syndrome)


(12) Multiple injuries due to trauma











Ricardo Pineda                 Feb 3, 2021 13:32

## 2021-02-03 NOTE — NUR
NURSE NOTES:

Received report from TERESITA Lora. Pt in bed, AAO x 2, breathing even and unlabored on RA. 
Denies pain or discomfort. IV sites intact and patent. Condom cath in place. Bed locked, 
lowest position, alarm on, side rails up, call light within reach. Will continue to monitor.

## 2021-02-03 NOTE — INTERNAL MED PROGRESS NOTE
Subjective


Date of Service:  Feb 3, 2021


Physician Name


Dominic Temple


Attending Physician


Aron Padron MD





Current Medications








 Medications


  (Trade)  Dose


 Ordered  Sig/Eddie


 Route


 PRN Reason  Start Time


 Stop Time Status Last Admin


Dose Admin


 


 Acetaminophen


  (Tylenol)  325 mg  Q4H  PRN


 ORAL


 For Pain  1/29/21 00:30


 2/28/21 00:29  2/3/21 03:14





 


 Dextrose/


 Electrolytes  1,000 ml @ 


 75 mls/hr  [id number]


 IV


   1/29/21 02:00


 2/28/21 01:59  2/3/21 01:02





 


 Docusate Sodium


  (Colace)  100 mg  TWICE A  DAY


 ORAL


   2/1/21 18:00


 3/3/21 17:59  2/3/21 08:31





 


 Latanoprost


  (Xalatan)  1 drop  BEDTIME


 BOTH EYES


   1/29/21 21:00


 2/28/21 20:59  2/2/21 20:08





 


 Pantoprazole


  (Protonix)  40 mg  DAILY


 ORAL


   1/29/21 09:00


 2/28/21 08:59  2/3/21 08:31





 


 Phenytoin


  (Dilantin)  300 mg  BEDTIME


 ORAL


   1/29/21 21:00


 2/28/21 20:59  2/2/21 20:08





 


 Polyethylene


 Glycol


  (Miralax)  17 gm  DAILYPRN  PRN


 ORAL


 Constipation  2/1/21 12:00


 3/3/21 11:59  2/1/21 13:59





 


 Primidone


  (Mysoline)  250 mg  TID


 ORAL


   1/29/21 09:00


 2/28/21 08:59  2/3/21 08:32





 


 Sumatriptan


 Succinate


  (Imitrex)  100 mg  TWICE A DAY  PRN


 ORAL


 For Headache  1/29/21 00:15


 2/28/21 00:14  1/31/21 20:11





 


 Tamsulosin HCl


  (Flomax)  0.4 mg  BEDTIME


 ORAL


   1/29/21 01:00


 2/28/21 00:59  2/2/21 20:08











Allergies:  


Coded Allergies:  


     SHELLFISH DERIVED (Unverified  Allergy, Intermediate, ITCHING/HIVES, 

7/25/14)


     IODINE (Unverified  Allergy, Unknown, 7/25/14)


Uncoded Allergies:  


     SPINACH (Allergy, Intermediate, SEVERE ITCHING/HIVES, 7/25/14)


ROS Limited/Unobtainable:  Yes


Subjective


71 YO M admitted with altered mental status.  Now UTI and sepsis.  Cover for Int

David - Dr Padron





Objective





Last Vital Signs








  Date Time  Temp Pulse Resp B/P (MAP) Pulse Ox O2 Delivery O2 Flow Rate FiO2


 


2/3/21 09:00      Room Air  


 


2/3/21 08:00 97.4 77 18 101/66 (78) 94   


 


1/28/21 21:56        100











Laboratory Tests








Test


 2/3/21


05:22


 


White Blood Count


 10.5 K/UL


(4.8-10.8)  #


 


Red Blood Count


 4.02 M/UL


(4.70-6.10)  L


 


Hemoglobin


 13.1 G/DL


(14.2-18.0)  L


 


Hematocrit


 38.7 %


(42.0-52.0)  L


 


Mean Corpuscular Volume 96 FL (80-99)  


 


Mean Corpuscular Hemoglobin


 32.7 PG


(27.0-31.0)  H


 


Mean Corpuscular Hemoglobin


Concent 33.9 G/DL


(32.0-36.0)


 


Red Cell Distribution Width


 15.5 %


(11.6-14.8)  H


 


Platelet Count


 258 K/UL


(150-450)


 


Mean Platelet Volume


 7.5 FL


(6.5-10.1)


 


Neutrophils (%) (Auto)


 80.2 %


(45.0-75.0)  H


 


Lymphocytes (%) (Auto)


 12.3 %


(20.0-45.0)  L


 


Monocytes (%) (Auto)


 7.1 %


(1.0-10.0)


 


Eosinophils (%) (Auto)


 0.1 %


(0.0-3.0)


 


Basophils (%) (Auto)


 0.3 %


(0.0-2.0)


 


Sodium Level


 137 MMOL/L


(136-145)


 


Potassium Level


 4.0 MMOL/L


(3.5-5.1)


 


Chloride Level


 102 MMOL/L


()


 


Carbon Dioxide Level


 27 MMOL/L


(21-32)


 


Anion Gap


 8 mmol/L


(5-15)


 


Blood Urea Nitrogen


 11 mg/dL


(7-18)


 


Creatinine


 0.6 MG/DL


(0.55-1.30)


 


Estimat Glomerular Filtration


Rate > 60 mL/min


(>60)


 


Glucose Level


 118 MG/DL


()  H


 


Calcium Level


 9.1 MG/DL


(8.5-10.1)

















Intake and Output  


 


 2/2/21 2/3/21





 19:00 07:00


 


Intake Total  240 ml


 


Output Total 750 ml 900 ml


 


Balance -750 ml -660 ml


 


  


 


Intake Oral  240 ml


 


Output Urine Total 750 ml 900 ml


 


# Voids 1 








Objective


Objective


General: No acute distress, awake and alert


HEENT: NCAT, sclera anicteric, PERRL, EOMI.


Neck: Supple, no significant jugular venous distention, 


Lungs: fair inspiratory effort, decreased air in the bases, no Wheeze or Rales.


Heart: Regular rate and rhythm, normal S1/S2, no murmurs.


Abdomen: soft, nontender, nondistended. Normoactive bowel sounds.


 / Rectal: Refused and deferred.


Extremities: No Cyanosis , clubbing or edema. 


Neuro: A&O x 3, Able to move all extremities, bilateral lower extremity weakness


Skin: warm, no rash.


Psych: Normal mood and affect





Assessment/Plan


altered mental status


toxic metabolic encephalopathy


urinary tract infection=Aerococcus urinae


 dehydration.


Sepsis secondary to urinary tract infection.


Dehydration.


Acute kidney injury.


Seizure disorder.


COPD.


History of hepatitis B+


BPH








Plan:


Antibiotics: S/P Rocephin


CODE STATUS full code.


DVT prophylaxis heparin subcu.


Follow-up with infection disease recommendation.


PT mobility consultation.


Inf Dis=Dr. Corrie Fletcher


Pulmonary=Dr. Marie


Discharge planning:  [facility]











Dominic Temple MD                Feb 3, 2021 11:27

## 2021-02-03 NOTE — NUR
CASE MANAGEMENT:REVIEW



2/3/21

SI: ENCEPHALOPATHY. SEPSIS D/T  UTI

98.8   70  20  132/70  92% ON RA  



IS: 

IVF@75/HR

COLACE PO BID

DILANTIN PO QHS

PROTONIX PO QD

MYSOLINE PO TID

FLOMAX PO QHS

**MED/SURG STATUS

DCP: FROM HOME



PLAN:

MD IS RECOMMENDING SNF

## 2021-02-03 NOTE — INFECTIOUS DISEASES PROG NOTE
Assessment/Plan





69yo M with:





Afebrile


Normal WBC


Satting well on RA


UTI, although asymptomatic


R/o infection


Weakness, failure to thrive


   1/28 BCx NTD


   COVID PCR neg


   UA+, UCx >100k Aerococcus urinae


   CXR: No acute process


   CTH: Neg for acute process





H/o HBV - per pt doesn't have it anymore


Cirrhosis


Elevated alk phos to 200s


   Abd US: COARSE HEPATIC ECHOTEXTURE WHICH MAY BE RELATED THE PATIENT'S HISTORY

OF HEPATITIS B. NO RAFFAELE CIRRHOSIS OR SPACE-OCCUPYING LESION SEEN PRESENTLY.





HIV screen neg





PMH:


COPD


CVA


Seizure d/o


HBV c/b cirrhosis





Plan:


Cont to monitor off abx


OK to d/c from ID standpoint





F/u HBV Viral load, HBV serologies


F/u HCV Ab screen





Monitor CBC/CMP


Monitor temp curve, hemodynamics


Monitor resp status





D/w RN





Thank you for this consult. [facility] will continue to follow.





Subjective


Allergies:  


Coded Allergies:  


     SHELLFISH DERIVED (Unverified  Allergy, Intermediate, ITCHING/HIVES, 

7/25/14)


     IODINE (Unverified  Allergy, Unknown, 7/25/14)


Uncoded Allergies:  


     SPINACH (Allergy, Intermediate, SEVERE ITCHING/HIVES, 7/25/14)


AF


NAD on RA


WBC 10.5


Off abx now


Feeling fine, just weak





Objective





Last 24 Hour Vital Signs








  Date Time  Temp Pulse Resp B/P (MAP) Pulse Ox O2 Delivery O2 Flow Rate FiO2


 


2/3/21 04:00 98.8 70 20 132/70 (90) 92   


 


2/3/21 00:00 98.2 98 20 138/72 (94) 92   


 


2/2/21 21:00      Room Air  


 


2/2/21 20:00 98.4 95 20 135/72 (93) 95   


 


2/2/21 16:00 97.7 86 20 117/70 (86) 94   


 


2/2/21 12:57 97.4       


 


2/2/21 12:00 97.3 80 20 129/75 (93) 94   


 


2/2/21 09:00      Room Air  








Height (Feet):  5


Height (Inches):  10.00


Weight (Pounds):  128


Gen: NAD


HEENT: NCAT


Pulm: BL chest rise 


Abd: Non-distended


Ext: No c/c/e


Skin: No visible rashes


Neuro: Awake





Laboratory Tests








Test


 2/3/21


05:22


 


White Blood Count


 10.5 K/UL


(4.8-10.8)  #


 


Red Blood Count


 4.02 M/UL


(4.70-6.10)  L


 


Hemoglobin


 13.1 G/DL


(14.2-18.0)  L


 


Hematocrit


 38.7 %


(42.0-52.0)  L


 


Mean Corpuscular Volume 96 FL (80-99)  


 


Mean Corpuscular Hemoglobin


 32.7 PG


(27.0-31.0)  H


 


Mean Corpuscular Hemoglobin


Concent 33.9 G/DL


(32.0-36.0)


 


Red Cell Distribution Width


 15.5 %


(11.6-14.8)  H


 


Platelet Count


 258 K/UL


(150-450)


 


Mean Platelet Volume


 7.5 FL


(6.5-10.1)


 


Neutrophils (%) (Auto)


 80.2 %


(45.0-75.0)  H


 


Lymphocytes (%) (Auto)


 12.3 %


(20.0-45.0)  L


 


Monocytes (%) (Auto)


 7.1 %


(1.0-10.0)


 


Eosinophils (%) (Auto)


 0.1 %


(0.0-3.0)


 


Basophils (%) (Auto)


 0.3 %


(0.0-2.0)


 


Sodium Level


 137 MMOL/L


(136-145)


 


Potassium Level


 4.0 MMOL/L


(3.5-5.1)


 


Chloride Level


 102 MMOL/L


()


 


Carbon Dioxide Level


 27 MMOL/L


(21-32)


 


Anion Gap


 8 mmol/L


(5-15)


 


Blood Urea Nitrogen


 11 mg/dL


(7-18)


 


Creatinine


 0.6 MG/DL


(0.55-1.30)


 


Estimat Glomerular Filtration


Rate > 60 mL/min


(>60)


 


Glucose Level


 118 MG/DL


()  H


 


Calcium Level


 9.1 MG/DL


(8.5-10.1)











Current Medications








 Medications


  (Trade)  Dose


 Ordered  Sig/Eddie


 Route


 PRN Reason  Start Time


 Stop Time Status Last Admin


Dose Admin


 


 Acetaminophen


  (Tylenol)  325 mg  Q4H  PRN


 ORAL


 For Pain  1/29/21 00:30


 2/28/21 00:29  2/3/21 03:14





 


 Dextrose/


 Electrolytes  1,000 ml @ 


 75 mls/hr  [id number]


 IV


   1/29/21 02:00


 2/28/21 01:59  2/3/21 01:02





 


 Docusate Sodium


  (Colace)  100 mg  TWICE A  DAY


 ORAL


   2/1/21 18:00


 3/3/21 17:59  2/3/21 08:31





 


 Latanoprost


  (Xalatan)  1 drop  BEDTIME


 BOTH EYES


   1/29/21 21:00


 2/28/21 20:59  2/2/21 20:08





 


 Pantoprazole


  (Protonix)  40 mg  DAILY


 ORAL


   1/29/21 09:00


 2/28/21 08:59  2/3/21 08:31





 


 Phenytoin


  (Dilantin)  300 mg  BEDTIME


 ORAL


   1/29/21 21:00


 2/28/21 20:59  2/2/21 20:08





 


 Polyethylene


 Glycol


  (Miralax)  17 gm  DAILYPRN  PRN


 ORAL


 Constipation  2/1/21 12:00


 3/3/21 11:59  2/1/21 13:59





 


 Primidone


  (Mysoline)  250 mg  TID


 ORAL


   1/29/21 09:00


 2/28/21 08:59  2/3/21 08:32





 


 Sumatriptan


 Succinate


  (Imitrex)  100 mg  TWICE A DAY  PRN


 ORAL


 For Headache  1/29/21 00:15


 2/28/21 00:14  1/31/21 20:11





 


 Tamsulosin HCl


  (Flomax)  0.4 mg  BEDTIME


 ORAL


   1/29/21 01:00


 2/28/21 00:59  2/2/21 20:08




















Corrie Fletcher M.D.                Feb 3, 2021 08:57

## 2021-02-03 NOTE — NUR
***INSURANCE****



CLINICALS/REVIEW  FAXED TO [facility]

  [phone] 

TELE [phone]



[facility]

 [phone]  and [phone]

TELE [phone]

## 2021-02-03 NOTE — NUR
NURSE HAND-OFF: 



Important Events on Shift:[PT done, Permission from POA to give pt's information to daughter 
and discuss with her regarding important issue]

Patient Status: [stable]

Diet: [reg MS]



Pending Orders: []

Pending Results/Labs:[]

Pending MD notification:[]



Latest Vital Signs: Temperature 97.9 , Pulse 78 , B/P 107 /68 , Respiratory Rate 18 , O2 SAT 
95 , Room Air, O2 Flow Rate .  

Vital Sign Comment: [stable]



Latest Copeland Fall Score: 95  

Fall Risk: High Risk 

Safety Measures: Call light Within Reach, Bed Alarm Zone 1, Side Rails Side Rails x3, Bed 
position Low and Locked.

Fall Precautions: 

Yellow Socks

Yellow Gown

Door Sign



Report given to [TERESITA Borges].

## 2021-02-03 NOTE — NUR
NURSE NOTES:

Spoke to Jorgito LOUIS over the phone, received permission from POA to give pt's medical 
information and plans to pt's daughter and discuss with her regarding pt.

Daughter's name Gricelda Stein [phone]

## 2021-02-03 NOTE — PULMONOLOGY PROGRESS NOTE
Subjective


ROS Limited/Unobtainable:  Yes


Interval Events:  none major reported per nursing


Constitutional:  Reports: no symptoms


HEENT:  Repors: no symptoms


Respiratory:  Reports: no symptoms


Cardiovascular:  Reports: no symptoms


Gastrointestinal/Abdominal:  Reports: no symptoms


Allergies:  


Coded Allergies:  


     SHELLFISH DERIVED (Unverified  Allergy, Intermediate, ITCHING/HIVES, 

7/25/14)


     IODINE (Unverified  Allergy, Unknown, 7/25/14)


Uncoded Allergies:  


     SPINACH (Allergy, Intermediate, SEVERE ITCHING/HIVES, 7/25/14)





Objective





Last 24 Hour Vital Signs








  Date Time  Temp Pulse Resp B/P (MAP) Pulse Ox O2 Delivery O2 Flow Rate FiO2


 


2/3/21 09:00      Room Air  


 


2/3/21 08:00 97.4 77 18 101/66 (78) 94   


 


2/3/21 04:00 98.8 70 20 132/70 (90) 92   


 


2/3/21 00:00 98.2 98 20 138/72 (94) 92   


 


2/2/21 21:00      Room Air  


 


2/2/21 20:00 98.4 95 20 135/72 (93) 95   


 


2/2/21 16:00 97.7 86 20 117/70 (86) 94   


 


2/2/21 12:57 97.4       

















Intake and Output  


 


 2/2/21 2/3/21





 19:00 07:00


 


Intake Total  240 ml


 


Output Total 750 ml 900 ml


 


Balance -750 ml -660 ml


 


  


 


Intake Oral  240 ml


 


Output Urine Total 750 ml 900 ml


 


# Voids 1 








General Appearance:  no acute distress


HEENT:  atraumatic


Respiratory:  lungs clear


Abdomen:  soft, non tender


Laboratory Tests


2/3/21 05:22: 


White Blood Count 10.5#, Red Blood Count 4.02L, Hemoglobin 13.1L, Hematocrit 

38.7L, Mean Corpuscular Volume 96, Mean Corpuscular Hemoglobin 32.7H, Mean 

Corpuscular Hemoglobin Concent 33.9, Red Cell Distribution Width 15.5H, Platelet

Count 258, Mean Platelet Volume 7.5, Neutrophils (%) (Auto) 80.2H, Lymphocytes 

(%) (Auto) 12.3L, Monocytes (%) (Auto) 7.1, Eosinophils (%) (Auto) 0.1, 

Basophils (%) (Auto) 0.3, Sodium Level 137, Potassium Level 4.0, Chloride Level 

102, Carbon Dioxide Level 27, Anion Gap 8, Blood Urea Nitrogen 11, Creatinine 

0.6, Estimat Glomerular Filtration Rate > 60, Glucose Level 118H, Calcium Level 

9.1





Current Medications








 Medications


  (Trade)  Dose


 Ordered  Sig/Eddie


 Route


 PRN Reason  Start Time


 Stop Time Status Last Admin


Dose Admin


 


 Acetaminophen


  (Tylenol)  325 mg  Q4H  PRN


 ORAL


 For Pain  1/29/21 00:30


 2/28/21 00:29  2/3/21 03:14





 


 Dextrose/


 Electrolytes  1,000 ml @ 


 75 mls/hr  [id number]


 IV


   1/29/21 02:00


 2/28/21 01:59  2/3/21 01:02





 


 Docusate Sodium


  (Colace)  100 mg  TWICE A  DAY


 ORAL


   2/1/21 18:00


 3/3/21 17:59  2/3/21 08:31





 


 Latanoprost


  (Xalatan)  1 drop  BEDTIME


 BOTH EYES


   1/29/21 21:00


 2/28/21 20:59  2/2/21 20:08





 


 Pantoprazole


  (Protonix)  40 mg  DAILY


 ORAL


   1/29/21 09:00


 2/28/21 08:59  2/3/21 08:31





 


 Phenytoin


  (Dilantin)  300 mg  BEDTIME


 ORAL


   1/29/21 21:00


 2/28/21 20:59  2/2/21 20:08





 


 Polyethylene


 Glycol


  (Miralax)  17 gm  DAILYPRN  PRN


 ORAL


 Constipation  2/1/21 12:00


 3/3/21 11:59  2/1/21 13:59





 


 Primidone


  (Mysoline)  250 mg  TID


 ORAL


   1/29/21 09:00


 2/28/21 08:59  2/3/21 08:32





 


 Sumatriptan


 Succinate


  (Imitrex)  100 mg  TWICE A DAY  PRN


 ORAL


 For Headache  1/29/21 00:15


 2/28/21 00:14  1/31/21 20:11





 


 Tamsulosin HCl


  (Flomax)  0.4 mg  BEDTIME


 ORAL


   1/29/21 01:00


 2/28/21 00:59  2/2/21 20:08














Assessment/Plan


Assessment/Plan


1. History of COPD


   -Patient denies using home oxygen


   -Currently saturating well on room air


   -Monitor for hypoxia and provide supplemental oxygen as needed


2. COVID-19 PCR negative


   - off isolation


3. History of hepatitis B


   -Per patient, he does not have it anymore


   -Abdominal US shows coarse hepatic echotexture.  No cirrhosis or space-

occupying lesion seen.


   - ID following


   - hep panel pending


4. Aerococcus UTI


   -s/p Rocephin for possible UTI per ID


   - ID following


5. DVT ppx 


   - on Heparin subcu





PT recommends placement to SNF due to weakness


Medically stable from pulmonary standpoint


Recommend dc to SNF, pt very unsteady





The care for this patient was discussed with my supervising physician.


Time spent for this case was approximately 31 minutes.











Teo Jay                  Feb 3, 2021 12:31

## 2021-02-04 VITALS — SYSTOLIC BLOOD PRESSURE: 127 MMHG | DIASTOLIC BLOOD PRESSURE: 80 MMHG

## 2021-02-04 VITALS — SYSTOLIC BLOOD PRESSURE: 124 MMHG | DIASTOLIC BLOOD PRESSURE: 82 MMHG

## 2021-02-04 VITALS — SYSTOLIC BLOOD PRESSURE: 107 MMHG | DIASTOLIC BLOOD PRESSURE: 59 MMHG

## 2021-02-04 VITALS — SYSTOLIC BLOOD PRESSURE: 124 MMHG | DIASTOLIC BLOOD PRESSURE: 81 MMHG

## 2021-02-04 VITALS — SYSTOLIC BLOOD PRESSURE: 127 MMHG | DIASTOLIC BLOOD PRESSURE: 86 MMHG

## 2021-02-04 LAB
ADD MANUAL DIFF: NO
ANION GAP SERPL CALC-SCNC: 7 MMOL/L (ref 5–15)
BASOPHILS NFR BLD AUTO: 0.5 % (ref 0–2)
BUN SERPL-MCNC: 12 MG/DL (ref 7–18)
CALCIUM SERPL-MCNC: 9.3 MG/DL (ref 8.5–10.1)
CHLORIDE SERPL-SCNC: 101 MMOL/L (ref 98–107)
CO2 SERPL-SCNC: 27 MMOL/L (ref 21–32)
CREAT SERPL-MCNC: 0.5 MG/DL (ref 0.55–1.3)
EOSINOPHIL NFR BLD AUTO: 0.1 % (ref 0–3)
ERYTHROCYTE [DISTWIDTH] IN BLOOD BY AUTOMATED COUNT: 15.8 % (ref 11.6–14.8)
HCT VFR BLD CALC: 37.8 % (ref 42–52)
HGB BLD-MCNC: 12.9 G/DL (ref 14.2–18)
LYMPHOCYTES NFR BLD AUTO: 11.9 % (ref 20–45)
MCV RBC AUTO: 96 FL (ref 80–99)
MONOCYTES NFR BLD AUTO: 7.9 % (ref 1–10)
NEUTROPHILS NFR BLD AUTO: 79.7 % (ref 45–75)
PLATELET # BLD: 251 K/UL (ref 150–450)
POTASSIUM SERPL-SCNC: 4 MMOL/L (ref 3.5–5.1)
RBC # BLD AUTO: 3.94 M/UL (ref 4.7–6.1)
SODIUM SERPL-SCNC: 135 MMOL/L (ref 136–145)
WBC # BLD AUTO: 11.9 K/UL (ref 4.8–10.8)

## 2021-02-04 RX ADMIN — DOCUSATE SODIUM SCH MG: 100 CAPSULE, LIQUID FILLED ORAL at 17:22

## 2021-02-04 RX ADMIN — DEXTROSE, SODIUM CHLORIDE, AND POTASSIUM CHLORIDE SCH MLS/HR: 5; .45; .15 INJECTION INTRAVENOUS at 04:01

## 2021-02-04 RX ADMIN — DOCUSATE SODIUM SCH MG: 100 CAPSULE, LIQUID FILLED ORAL at 09:08

## 2021-02-04 RX ADMIN — DEXTROSE, SODIUM CHLORIDE, AND POTASSIUM CHLORIDE SCH MLS/HR: 5; .45; .15 INJECTION INTRAVENOUS at 17:22

## 2021-02-04 RX ADMIN — POLYETHYLENE GLYCOL 3350 PRN GM: 17 POWDER, FOR SOLUTION ORAL at 11:52

## 2021-02-04 NOTE — INFECTIOUS DISEASES PROG NOTE
Assessment/Plan





71yo M with:





Afebrile


Normal WBC


Satting well on RA


UTI, although asymptomatic


R/o infection


Weakness, failure to thrive


   1/28 BCx NTD


   COVID PCR neg


   UA+, UCx >100k Aerococcus urinae


   CXR: No acute process


   CTH: Neg for acute process





H/o HBV - per pt doesn't have it anymore


   HBV sAg neg, cAb pos, sAb pos --> cleared infection, immune


   HCV neg


Cirrhosis


Elevated alk phos to 200s


   Abd US: COARSE HEPATIC ECHOTEXTURE WHICH MAY BE RELATED THE PATIENT'S HISTORY

OF HEPATITIS B. NO RAFFAELE CIRRHOSIS OR SPACE-OCCUPYING LESION SEEN PRESENTLY.





HIV screen neg





PMH:


COPD


CVA


Seizure d/o


HBV c/b cirrhosis





Plan:


Cont to monitor off abx


OK to d/c from ID standpoint





Monitor CBC/CMP


Monitor temp curve, hemodynamics


Monitor resp status





D/w RN





Thank you for this consult. [facility] will continue to follow.





Subjective


Allergies:  


Coded Allergies:  


     SHELLFISH DERIVED (Unverified  Allergy, Intermediate, ITCHING/HIVES, 

7/25/14)


     IODINE (Unverified  Allergy, Unknown, 7/25/14)


Uncoded Allergies:  


     SPINACH (Allergy, Intermediate, SEVERE ITCHING/HIVES, 7/25/14)


AF


NAD on RA


WBC 11


Doing well, wants to go home


Clifford in place





Objective





Last 24 Hour Vital Signs








  Date Time  Temp Pulse Resp B/P (MAP) Pulse Ox O2 Delivery O2 Flow Rate FiO2


 


2/4/21 04:00 97.9 89 20 107/59 (75) 93   


 


2/4/21 00:00 97.9 101 20 124/82 (96) 93   


 


2/3/21 21:00      Room Air  


 


2/3/21 20:00 97.8 98 18 131/80 (97) 93   


 


2/3/21 16:00 97.9 78 18 107/68 (81) 95   


 


2/3/21 12:00 97.8 82 18 119/68 (85) 93   


 


2/3/21 09:00      Room Air  








Height (Feet):  5


Height (Inches):  10.00


Weight (Pounds):  128


Gen: NAD


HEENT: NCAT


Pulm: BL chest rise 


Abd: Non-distended


Ext: No c/c/e


Skin: No visible rashes


Neuro: Awake





Laboratory Tests








Test


 2/4/21


05:10


 


White Blood Count


 11.9 K/UL


(4.8-10.8)  H


 


Red Blood Count


 3.94 M/UL


(4.70-6.10)  L


 


Hemoglobin


 12.9 G/DL


(14.2-18.0)  L


 


Hematocrit


 37.8 %


(42.0-52.0)  L


 


Mean Corpuscular Volume 96 FL (80-99)  


 


Mean Corpuscular Hemoglobin


 32.8 PG


(27.0-31.0)  H


 


Mean Corpuscular Hemoglobin


Concent 34.3 G/DL


(32.0-36.0)


 


Red Cell Distribution Width


 15.8 %


(11.6-14.8)  H


 


Platelet Count


 251 K/UL


(150-450)


 


Mean Platelet Volume


 7.0 FL


(6.5-10.1)


 


Neutrophils (%) (Auto)


 79.7 %


(45.0-75.0)  H


 


Lymphocytes (%) (Auto)


 11.9 %


(20.0-45.0)  L


 


Monocytes (%) (Auto)


 7.9 %


(1.0-10.0)


 


Eosinophils (%) (Auto)


 0.1 %


(0.0-3.0)


 


Basophils (%) (Auto)


 0.5 %


(0.0-2.0)


 


Sodium Level


 135 MMOL/L


(136-145)  L


 


Potassium Level


 4.0 MMOL/L


(3.5-5.1)


 


Chloride Level


 101 MMOL/L


()


 


Carbon Dioxide Level


 27 MMOL/L


(21-32)


 


Anion Gap


 7 mmol/L


(5-15)


 


Blood Urea Nitrogen


 12 mg/dL


(7-18)


 


Creatinine


 0.5 MG/DL


(0.55-1.30)  L


 


Estimat Glomerular Filtration


Rate > 60 mL/min


(>60)


 


Glucose Level


 105 MG/DL


()


 


Calcium Level


 9.3 MG/DL


(8.5-10.1)











Current Medications








 Medications


  (Trade)  Dose


 Ordered  Sig/Eddie


 Route


 PRN Reason  Start Time


 Stop Time Status Last Admin


Dose Admin


 


 Acetaminophen


  (Tylenol)  325 mg  Q4H  PRN


 ORAL


 For Pain  1/29/21 00:30


 2/28/21 00:29  2/3/21 03:14





 


 Dextrose/


 Electrolytes  1,000 ml @ 


 75 mls/hr  [id number]


 IV


   1/29/21 02:00


 2/28/21 01:59  2/4/21 04:01





 


 Docusate Sodium


  (Colace)  100 mg  TWICE A  DAY


 ORAL


   2/1/21 18:00


 3/3/21 17:59  2/3/21 18:22





 


 Latanoprost


  (Xalatan)  1 drop  BEDTIME


 BOTH EYES


   1/29/21 21:00


 2/28/21 20:59  2/3/21 20:46





 


 Pantoprazole


  (Protonix)  40 mg  DAILY


 ORAL


   1/29/21 09:00


 2/28/21 08:59  2/3/21 08:31





 


 Phenytoin


  (Dilantin)  300 mg  BEDTIME


 ORAL


   1/29/21 21:00


 2/28/21 20:59  2/3/21 20:47





 


 Polyethylene


 Glycol


  (Miralax)  17 gm  DAILYPRN  PRN


 ORAL


 Constipation  2/1/21 12:00


 3/3/21 11:59  2/1/21 13:59





 


 Primidone


  (Mysoline)  250 mg  TID


 ORAL


   1/29/21 09:00


 2/28/21 08:59  2/3/21 18:22





 


 Sumatriptan


 Succinate


  (Imitrex)  100 mg  TWICE A DAY  PRN


 ORAL


 For Headache  1/29/21 00:15


 2/28/21 00:14  1/31/21 20:11





 


 Tamsulosin HCl


  (Flomax)  0.4 mg  BEDTIME


 ORAL


   1/29/21 01:00


 2/28/21 00:59  2/3/21 20:46




















Corrie Fletcher M.D.                Feb 4, 2021 08:54

## 2021-02-04 NOTE — NUR
NURSE HAND-OFF: 



Important Events on Shift:

Patient Status: 

Diet: regular



Pending Orders: 

Pending Results/Labs:

Pending MD notification:



Latest Vital Signs: Temperature 97.9 , Pulse 89 , B/P 107 /59 , Respiratory Rate 20 , O2 SAT 
93 , Room Air, O2 Flow Rate .  

Vital Sign Comment: 



Latest Copeland Fall Score: 95  

Fall Risk: High Risk 

Safety Measures: Call light Within Reach, Bed Alarm Zone 1, Side Rails Side Rails x3, Bed 
position Low and Locked.

Fall Precautions: 

Yellow Socks

Yellow Gown

Door Sign



Report given to Melissa LO.

## 2021-02-04 NOTE — NUR
NURSE NOTES:



Belongings checked with SHYAM Abraham. ID/IV removed. Pt discharged in stable condition with 
ambulance personnel.

## 2021-02-04 NOTE — INTERNAL MED PROGRESS NOTE
Subjective


Physician Name


Aron Padron


Attending Physician


Aron Padron MD





Current Medications








 Medications


  (Trade)  Dose


 Ordered  Sig/Eddie


 Route


 PRN Reason  Start Time


 Stop Time Status Last Admin


Dose Admin


 


 Acetaminophen


  (Tylenol)  325 mg  Q4H  PRN


 ORAL


 For Pain  1/29/21 00:30


 2/28/21 00:29  2/3/21 03:14





 


 Dextrose/


 Electrolytes  1,000 ml @ 


 75 mls/hr  [id number]


 IV


   1/29/21 02:00


 2/28/21 01:59  2/4/21 04:01





 


 Docusate Sodium


  (Colace)  100 mg  TWICE A  DAY


 ORAL


   2/1/21 18:00


 3/3/21 17:59  2/4/21 17:22





 


 Latanoprost


  (Xalatan)  1 drop  BEDTIME


 BOTH EYES


   1/29/21 21:00


 2/28/21 20:59  2/3/21 20:46





 


 Pantoprazole


  (Protonix)  40 mg  DAILY


 ORAL


   1/29/21 09:00


 2/28/21 08:59  2/4/21 09:08





 


 Phenytoin


  (Dilantin)  300 mg  BEDTIME


 ORAL


   1/29/21 21:00


 2/28/21 20:59  2/3/21 20:47





 


 Polyethylene


 Glycol


  (Miralax)  17 gm  DAILYPRN  PRN


 ORAL


 Constipation  2/1/21 12:00


 3/3/21 11:59  2/4/21 11:52





 


 Primidone


  (Mysoline)  250 mg  TID


 ORAL


   1/29/21 09:00


 2/28/21 08:59  2/4/21 17:22





 


 Sumatriptan


 Succinate


  (Imitrex)  100 mg  TWICE A DAY  PRN


 ORAL


 For Headache  1/29/21 00:15


 2/28/21 00:14  1/31/21 20:11





 


 Tamsulosin HCl


  (Flomax)  0.4 mg  BEDTIME


 ORAL


   1/29/21 01:00


 2/28/21 00:59  2/3/21 20:46











Allergies:  


Coded Allergies:  


     SHELLFISH DERIVED (Unverified  Allergy, Intermediate, ITCHING/HIVES, 

7/25/14)


     IODINE (Unverified  Allergy, Unknown, 7/25/14)


Uncoded Allergies:  


     SPINACH (Allergy, Intermediate, SEVERE ITCHING/HIVES, 7/25/14)


Subjective


Awake, alert, responsive, denies any chest pain or shortness of breath.





Objective





Last Vital Signs








  Date Time  Temp Pulse Resp B/P (MAP) Pulse Ox O2 Delivery O2 Flow Rate FiO2


 


2/4/21 16:00 97.6 83 18 127/80 (96) 93   


 


2/4/21 09:00      Room Air  


 


1/28/21 21:56        100











Laboratory Tests








Test


 2/4/21


05:10


 


White Blood Count


 11.9 K/UL


(4.8-10.8)  H


 


Red Blood Count


 3.94 M/UL


(4.70-6.10)  L


 


Hemoglobin


 12.9 G/DL


(14.2-18.0)  L


 


Hematocrit


 37.8 %


(42.0-52.0)  L


 


Mean Corpuscular Volume 96 FL (80-99)  


 


Mean Corpuscular Hemoglobin


 32.8 PG


(27.0-31.0)  H


 


Mean Corpuscular Hemoglobin


Concent 34.3 G/DL


(32.0-36.0)


 


Red Cell Distribution Width


 15.8 %


(11.6-14.8)  H


 


Platelet Count


 251 K/UL


(150-450)


 


Mean Platelet Volume


 7.0 FL


(6.5-10.1)


 


Neutrophils (%) (Auto)


 79.7 %


(45.0-75.0)  H


 


Lymphocytes (%) (Auto)


 11.9 %


(20.0-45.0)  L


 


Monocytes (%) (Auto)


 7.9 %


(1.0-10.0)


 


Eosinophils (%) (Auto)


 0.1 %


(0.0-3.0)


 


Basophils (%) (Auto)


 0.5 %


(0.0-2.0)


 


Sodium Level


 135 MMOL/L


(136-145)  L


 


Potassium Level


 4.0 MMOL/L


(3.5-5.1)


 


Chloride Level


 101 MMOL/L


()


 


Carbon Dioxide Level


 27 MMOL/L


(21-32)


 


Anion Gap


 7 mmol/L


(5-15)


 


Blood Urea Nitrogen


 12 mg/dL


(7-18)


 


Creatinine


 0.5 MG/DL


(0.55-1.30)  L


 


Estimat Glomerular Filtration


Rate > 60 mL/min


(>60)


 


Glucose Level


 105 MG/DL


()


 


Calcium Level


 9.3 MG/DL


(8.5-10.1)

















Intake and Output  


 


 2/3/21 2/4/21





 19:00 07:00


 


Intake Total 75 ml 1065 ml


 


Output Total 500 ml 1900 ml


 


Balance -425 ml -835 ml


 


  


 


Intake Oral  240 ml


 


IV Total 75 ml 825 ml


 


Output Urine Total 500 ml 1900 ml








Objective


General: No acute distress, awake and alert


HEENT: NCAT, sclera anicteric, PERRL, EOMI.


Neck: Supple, no significant jugular venous distention, 


Lungs: fair inspiratory effort, decreased air in the bases, no Wheeze or Rales.


Heart: Regular rate and rhythm, normal S1/S2, no murmurs.


Abdomen: soft, nontender, nondistended. Normoactive bowel sounds.


 / Rectal: Refused and deferred.


Extremities: No Cyanosis , clubbing or edema. 


Neuro: A&O x 3, Able to move all extremities, bilateral lower extremity weakness


Skin: warm, no rash.


Psych: Normal mood and affect





Assessment/Plan


Assessment/Plan


altered mental status most likely secondary to toxic metabolic encephalopathy as

a result of urine tract infection and dehydration.


Sepsis secondary to urinary tract infection.


Dehydration.


Acute kidney injury.


Seizure disorder.


COPD.


History of hepatitis B+


BPH








Plan:


Follow-up with laboratory as well as culture in the morning


Antibiotics: Rocephin IV


CODE STATUS full code.


DVT prophylaxis heparin subcu.


Follow-up with infection disease recommendation.


DC to SNF.











Aron Padron MD                  Feb 4, 2021 17:32

## 2021-02-04 NOTE — NUR
CASE MANAGEMENT:REVIEW



2/4/21

SI: ENCEPHALOPATHY. SEPSIS D/T  UTI

98.0   81  19  124/81  92% ON RA  

WBC+11.9   H/H-12.9/37.8   



IS: IVF@75/HR

COLACE PO BID

DILANTIN PO QHS

PROTONIX PO QD

MYSOLINE PO TID

FLOMAX PO QHS

**MED/SURG STATUS

DCP: FROM HOME



PLAN:

MD IS RECOMMENDING SNF

[facility] WILLING TO ACCEPT IF THEY RECEIVE A LETTER OF AGREEMENT

## 2021-02-04 NOTE — NUR
*-*DISCHARGE PLANNED*-*



PATIENT HAS BEEN ACCEPTED AND WILL BE DISCHARGED BACK TO:



[facility]

 P: [phone] FOR NURSE TO NURSE REPORT

[address]OM[address]

[facility] SET FOR 3PM S/W IRVIN .

## 2021-02-04 NOTE — PULMONOLOGY PROGRESS NOTE
Subjective


ROS Limited/Unobtainable:  Yes


Interval Events:  none major reported per nursing


Constitutional:  Reports: no symptoms


HEENT:  Repors: no symptoms


Respiratory:  Reports: no symptoms


Cardiovascular:  Reports: no symptoms


Gastrointestinal/Abdominal:  Reports: no symptoms


Allergies:  


Coded Allergies:  


     SHELLFISH DERIVED (Unverified  Allergy, Intermediate, ITCHING/HIVES, 

7/25/14)


     IODINE (Unverified  Allergy, Unknown, 7/25/14)


Uncoded Allergies:  


     SPINACH (Allergy, Intermediate, SEVERE ITCHING/HIVES, 7/25/14)





Objective





Last 24 Hour Vital Signs








  Date Time  Temp Pulse Resp B/P (MAP) Pulse Ox O2 Delivery O2 Flow Rate FiO2


 


2/4/21 08:00 98.0 81 19 124/81 (95) 92   


 


2/4/21 04:00 97.9 89 20 107/59 (75) 93   


 


2/4/21 00:00 97.9 101 20 124/82 (96) 93   


 


2/3/21 21:00      Room Air  


 


2/3/21 20:00 97.8 98 18 131/80 (97) 93   


 


2/3/21 16:00 97.9 78 18 107/68 (81) 95   


 


2/3/21 12:00 97.8 82 18 119/68 (85) 93   

















Intake and Output  


 


 2/3/21 2/4/21





 19:00 07:00


 


Intake Total 75 ml 1065 ml


 


Output Total 500 ml 1900 ml


 


Balance -425 ml -835 ml


 


  


 


Intake Oral  240 ml


 


IV Total 75 ml 825 ml


 


Output Urine Total 500 ml 1900 ml








General Appearance:  no acute distress


HEENT:  atraumatic


Respiratory:  lungs clear


Abdomen:  soft, non tender


Laboratory Tests


2/4/21 05:10: 


White Blood Count 11.9H, Red Blood Count 3.94L, Hemoglobin 12.9L, Hematocrit 

37.8L, Mean Corpuscular Volume 96, Mean Corpuscular Hemoglobin 32.8H, Mean 

Corpuscular Hemoglobin Concent 34.3, Red Cell Distribution Width 15.8H, Platelet

Count 251, Mean Platelet Volume 7.0, Neutrophils (%) (Auto) 79.7H, Lymphocytes 

(%) (Auto) 11.9L, Monocytes (%) (Auto) 7.9, Eosinophils (%) (Auto) 0.1, 

Basophils (%) (Auto) 0.5, Sodium Level 135L, Potassium Level 4.0, Chloride Level

101, Carbon Dioxide Level 27, Anion Gap 7, Blood Urea Nitrogen 12, Creatinine 

0.5L, Estimat Glomerular Filtration Rate > 60, Glucose Level 105, Calcium Level 

9.3





Current Medications








 Medications


  (Trade)  Dose


 Ordered  Sig/Eddie


 Route


 PRN Reason  Start Time


 Stop Time Status Last Admin


Dose Admin


 


 Acetaminophen


  (Tylenol)  325 mg  Q4H  PRN


 ORAL


 For Pain  1/29/21 00:30


 2/28/21 00:29  2/3/21 03:14





 


 Dextrose/


 Electrolytes  1,000 ml @ 


 75 mls/hr  [id number]


 IV


   1/29/21 02:00


 2/28/21 01:59  2/4/21 04:01





 


 Docusate Sodium


  (Colace)  100 mg  TWICE A  DAY


 ORAL


   2/1/21 18:00


 3/3/21 17:59  2/4/21 09:08





 


 Latanoprost


  (Xalatan)  1 drop  BEDTIME


 BOTH EYES


   1/29/21 21:00


 2/28/21 20:59  2/3/21 20:46





 


 Pantoprazole


  (Protonix)  40 mg  DAILY


 ORAL


   1/29/21 09:00


 2/28/21 08:59  2/4/21 09:08





 


 Phenytoin


  (Dilantin)  300 mg  BEDTIME


 ORAL


   1/29/21 21:00


 2/28/21 20:59  2/3/21 20:47





 


 Polyethylene


 Glycol


  (Miralax)  17 gm  DAILYPRN  PRN


 ORAL


 Constipation  2/1/21 12:00


 3/3/21 11:59  2/1/21 13:59





 


 Primidone


  (Mysoline)  250 mg  TID


 ORAL


   1/29/21 09:00


 2/28/21 08:59  2/4/21 09:08





 


 Sumatriptan


 Succinate


  (Imitrex)  100 mg  TWICE A DAY  PRN


 ORAL


 For Headache  1/29/21 00:15


 2/28/21 00:14  1/31/21 20:11





 


 Tamsulosin HCl


  (Flomax)  0.4 mg  BEDTIME


 ORAL


   1/29/21 01:00


 2/28/21 00:59  2/3/21 20:46














Assessment/Plan


Assessment/Plan


1. History of COPD


   -Patient denies using home oxygen


   -Currently saturating well on room air


   -Monitor for hypoxia and provide supplemental oxygen as needed


2. COVID-19 PCR negative


   - off isolation


3. History of hepatitis B


   -Per patient, he does not have it anymore


   -Abdominal US shows coarse hepatic echotexture.  No cirrhosis or space-

occupying lesion seen.


   - ID following


   - hep B core Ab positive


4. Aerococcus UTI


   -s/p Rocephin for possible UTI per ID


   - ID following


5. DVT ppx 


   - on Heparin subcu





PT recommends placement to SNF due to weakness


Medically stable from pulmonary standpoint


Recommend dc to SNF, pt very unsteady





The care for this patient was discussed with my supervising physician.


Time spent for this case was approximately 31 minutes.











Teo Jay                  Feb 4, 2021 10:29

## 2021-02-04 NOTE — NUR
***INSURANCE***



CLINICALS/REVIEW  FAXED TO [facility]

  [phone] 

TELE [phone]



[facility]

 [phone]  and [phone]

TELE [phone]

## 2021-02-04 NOTE — NUR
*-*DISCHARGE PLANNING*-*



PATIENT HAS BEEN ACCEPTED TO:



[facility]

 P: [phone]

S/W ANNIE, PENDING FRANCES, WILL CALL BACK.

## 2021-02-04 NOTE — NUR
NURSE NOTES:



Received report from TERESITA Raines. Rounding done with outgoing nurse. Pt is asleep. No SOB 
noted. Rt FA IV access is patent. D51/2NS + KCl 20meq running @75ml/hr. Bed in lowest 
position, call light within reach. Will continue to monitor.

## 2021-02-04 NOTE — SURGERY PROGRESS NOTE
Surgery Progress Note


Subjective


Additional Comments


afebrile, HD stable


labs noted


micro reviewed 


comfortable appearing


no n/v





Objective





Last 24 Hour Vital Signs








  Date Time  Temp Pulse Resp B/P (MAP) Pulse Ox O2 Delivery O2 Flow Rate FiO2


 


2/4/21 04:00 97.9 89 20 107/59 (75) 93   


 


2/4/21 00:00 97.9 101 20 124/82 (96) 93   


 


2/3/21 21:00      Room Air  


 


2/3/21 20:00 97.8 98 18 131/80 (97) 93   


 


2/3/21 16:00 97.9 78 18 107/68 (81) 95   


 


2/3/21 12:00 97.8 82 18 119/68 (85) 93   


 


2/3/21 09:00      Room Air  








I&O











Intake and Output  


 


 2/3/21 2/4/21





 19:00 07:00


 


Intake Total 75 ml 1065 ml


 


Output Total 500 ml 1900 ml


 


Balance -425 ml -835 ml


 


  


 


Intake Oral  240 ml


 


IV Total 75 ml 825 ml


 


Output Urine Total 500 ml 1900 ml








Dressing:  other


Wound:  other


Cardiovascular:  RSR


Respiratory:  decreased breath sounds


Abdomen:  soft, non-tender, present bowel sounds, non-distended


Extremities:  no tenderness, no cyanosis





Laboratory Tests








Test


 2/4/21


05:10


 


White Blood Count


 11.9 K/UL


(4.8-10.8)  H


 


Red Blood Count


 3.94 M/UL


(4.70-6.10)  L


 


Hemoglobin


 12.9 G/DL


(14.2-18.0)  L


 


Hematocrit


 37.8 %


(42.0-52.0)  L


 


Mean Corpuscular Volume 96 FL (80-99)  


 


Mean Corpuscular Hemoglobin


 32.8 PG


(27.0-31.0)  H


 


Mean Corpuscular Hemoglobin


Concent 34.3 G/DL


(32.0-36.0)


 


Red Cell Distribution Width


 15.8 %


(11.6-14.8)  H


 


Platelet Count


 251 K/UL


(150-450)


 


Mean Platelet Volume


 7.0 FL


(6.5-10.1)


 


Neutrophils (%) (Auto)


 79.7 %


(45.0-75.0)  H


 


Lymphocytes (%) (Auto)


 11.9 %


(20.0-45.0)  L


 


Monocytes (%) (Auto)


 7.9 %


(1.0-10.0)


 


Eosinophils (%) (Auto)


 0.1 %


(0.0-3.0)


 


Basophils (%) (Auto)


 0.5 %


(0.0-2.0)


 


Sodium Level


 135 MMOL/L


(136-145)  L


 


Potassium Level


 4.0 MMOL/L


(3.5-5.1)


 


Chloride Level


 101 MMOL/L


()


 


Carbon Dioxide Level


 27 MMOL/L


(21-32)


 


Anion Gap


 7 mmol/L


(5-15)


 


Blood Urea Nitrogen


 12 mg/dL


(7-18)


 


Creatinine


 0.5 MG/DL


(0.55-1.30)  L


 


Estimat Glomerular Filtration


Rate > 60 mL/min


(>60)


 


Glucose Level


 105 MG/DL


()


 


Calcium Level


 9.3 MG/DL


(8.5-10.1)











Plan


Problems:  


(1) Abnormal LFTs


Assessment & Plan:  70 year old male with history of hepatitis comes in for 

weakness noted to have abnormal lft's.  


hx hepatitis


alk phos elevated


US noted


The liver demonstrates diffuse coarse hepatic echotexture. No ductal dilatation


noted. Spleen is unremarkable. The gallbladder is without sludge or stone. 

Common


bile duct measures 2 mm. The pancreas, aorta and cava are grossly unremarkable 

to the


extent visualized appear  The kidneys are normal in size, shape and axis.  No 

ascites


noted.


okay or diet


will monitor with exam


trend labs


thank you 











DAILY ESTIMATED NEEDS:


Needs based on Pulmonary 58kg  


25-35  kcals/kg 


0258-3609  total kcals


1-1.5  g protein/kg


58-87  g total protein 


25-30  mL/kg


2452-4584  total fluid mLs





NUTRITION DIAGNOSIS:


Increased kcal needs r/t Low BMI as evidenced by BMI 18.4, underweight per


guidelines 77% of Ideal Body Weight.








CURRENT DIET: Regular ms chopped    





 


PO DIET RECOMMENDATIONS:


Maintain regular/ texture as tolerated  





 





ADDITIONAL RECOMMENDATIONS:


1) Monitor po intake, rec SLP eval- possible upgrade?  


2) Obtain a standing weight as able or calibrated bed scale  


3) Check lytes daily, replete as needed  


4) Add Ensure Enlive BID  


5) MVI x 1 





(2) Encephalopathy


(3) Weakness


(4) UTI (urinary tract infection)


(5) Altered mental status


(6) Falls frequently


(7) Cerebellar atrophy


(8) Hip fracture, intertrochanteric


(9) Seizure disorder, complex partial


(10) Pulmonary embolism


(11) ACS (acute coronary syndrome)


(12) Multiple injuries due to trauma











Ricardo Pineda                 Feb 4, 2021 08:14

## 2021-02-08 NOTE — DISCHARGE SUMMARY
Discharge Summary


Discharge Summary


_


Date of admission: 1/20/2021





Date of discharge: 2/4/2021





Discharged by Dr. Marie





History of Present Illness and Brief Hospital Course


Mr. Garcia is a 71-year-old male with past medical history of hepatitis B, 

liver cirrhosis, COPD, CVA, and seizure disorder on Dilantin, who was brought in

by EMS from home for generalized weakness.  Apparently, EMS was called by the 

patient's physical therapist who had been helping him with rehabilitation after 

an injury last spring.  The physical therapist felt that patient was more 

confused than usual and had increased generalized weakness.  Patient reported 

mid right upper quadrant and flank pain over a few days.  CT of brain demonstr

ated no acute intracranial pathology.  Patient's labs were consistent with UTI. 

Patient was started on Rocephin.  Patient was admitted to the hospital for 

further treatment and evaluation.





Abdominal ultrasound showed coarsened hepatic echotexture.  No cirrhosis or 

space-occupying lesion was seen.  Hepatitis B panel was consistent with cleared 

infection, and immune status.  He tested negative for hepatitis C virus.  HIV 

test was negative.


Urine culture was positive for Aerococcus urinae. 





Throughout his hospitalization, patient was treated for UTI.  Patient was also 

evaluated by a physical therapist who found him very weak and unsteady.  Patient

was recommended to be placed at skilled nursing facility.  Patient was medically

stable for discharge and was discharged to SNF on 2/4/2021.





Consultants:


Infectious disease Dr. Fletcher


Pulmonology Dr. Marie


Surgery Dr. Pineda





Discharge Condition


Improved and stable





Final diagnoses


Aerococcus urinae UTI


History of cirrhosis


Elevated alk phos


History of COPD


History of CVA


History of seizure disorder


History of hepatitis B


Toxic metabolic encephalopathy


Dehydration


Sepsis


Acute kidney injury


History of BPH


Abnormal LFTs





I have been assigned to dictate discharge summary for this account.











Teo Jay                  Feb 8, 2021 14:28

## 2021-05-04 NOTE — NUR
***INSURANCE***



CLINICALS/REVIEW  FAXED TO [facility]

  [phone] 

TELE [phone]



[facility]

 [phone]  and [phone]

TELE [phone]: report given to this nurse from [address] [place] [address]    [phone]: MD Lemon Hair order noted for d/c pt  Geni Archuleta RN    [phone]: d/c instructions printed for review with pt  Geni Archuleta RN    1400: d/c instructions reviewed with pt, all questions answered, pt gets dressed and makes this nurse aware brother will arrive at 1500 for   [address]: education provided on removal of gauze and tape from IV extraction site  Geni Archuleta RN    1500: brother arrives to  pt transport paged, no c/o health concern noted at the time of d/c  ZABRINA Thompson RN    [phone]: transport takes pt to d/c area  Geni Archuleta RN

## 2022-10-04 NOTE — CONSULTATION
DATE OF CONSULTATION:  2020

NEUROLOGIC CONSULTATION



CONSULTING PHYSICIAN:  Storm Fink M.D.



CHIEF COMPLAINT:  This is the third [facility] admission for this

70-year-old right-handed white male who is admitted for falling.  Falls

increased about a year ago.  Last fell on 2020 where he is unsteady

on his feet and fell over.



I was asked to see the patient because of his falls.



HISTORY OF PRESENT ILLNESS:  The patient has history of polio beginning at

11-1/2 years of age, which left him with weakness of his abdominal muscles

and involved his right leg.  He has had a seizure disorder beginning in

childhood, but his last seizure was 30 years ago.  He is on Dilantin 100

mg b.i.d. and primidone 250 mg t.i.d.  The patient apparently had a

pulmonary embolus, fell and fractured his left hip and had surgery in 

and since that time, he has used a walker.  The patient generally has no

symptoms prior to his fall, except for perhaps unsteadiness.  He has no

dizzy spells.  He did have diplopia in the past, but that was corrected

with prisms, but he does not have it now.  There are no dizzy spells.  He

may have muscle weakness, it is little unclear.  There is occasional

numbness in his feet, but generally not with the falls.  He denies any

lightheadedness and there is no fainting episodes at least at this visit.

He has probably some mild hearing loss bilaterally.  He wears bifocals for

distance and near.  There is no tinnitus.  He denies a history of

stroke.



He was admitted to this hospital on 2017 for fainting episode

above where he fell and hit his head and had episodes of

"unresponsiveness."  The patient's EKG at that time was fairly

unremarkable.  He had diffuse atrophy of his brain and a CT scan with some

small vessel disease.  He had some osteopenia of the lumbar spine on CT

scan with arteriosclerosis noted.  The patient was anemic on that

admission.  The patient saw Dr. Bonilla.  His toes were downgoing at

that time.  His motor exam was normal.  His pinprick and light touch were

normal.  His gait was not tested.  He had slight decrease in hearing.

This patient had a diagnoses of head injuries.



The patient has a history of migraine headaches and is on amitriptyline

recently.  His dosage has been decreased.  Now, he is on 75 mg of that.

The patient has a history of glaucoma and is on Xalatan 1 drop both eyes

nightly.  He also takes tamsulosin 0.4 mg p.o. daily and is on Coumadin

7.5 mg p.o. daily and tolterodine 4 mg daily.  The patient on admission

was mildly anemic with normal platelet count, normal white counts.  His

Dilantin level was 17.8.  His urinalysis was basically normal.  The

chemistries revealed mildly elevated glucose, mildly elevated AST, and

elevated alkaline phosphatase of 185.  The C-reactive protein was 15.9.

The electrolytes were normal except for a slightly low potassium today.

The patient had a left hip x-ray revealing postsurgical posttraumatic

changes.  He also had a CT scan of the brain revealing cerebral atrophy.

Evidence of NPH and the right temporal tip encephalomalacia presumably an

old infarct, which was different from his previous study of 2017.

He had chronic microvascular ischemic changes, which have been

progressive.  I located and I felt he had a lot of cerebellar atrophy as

well.  He had a chest x-ray revealed bilateral basilar atelectasis and

somewhat hyperinflated lungs, especially in the upper lobes.  His mother

did have a history of epilepsy.



PAST MEDICAL HISTORY/PAST MEDICAL ILLNESSES:

1. Possible polio in the past.

2. Seizure disorder.

3. COPD and a 42-pack year history of tobacco abuse.

4. Benign prosodic hypertrophy.

5. History of pulmonary embolism.

6. Cataract surgery.

7. Migraine headaches.

8. Left hip fracture.  See above.



MEDICATIONS:  See above.



ALLERGIES:  He is allegedly allergic to iodine.



SURGICAL HISTORY:  He had cataract surgeries bilaterally, see above and

left hip surgery, see above.



FAMILY HISTORY:  His father  of a myocardial infarction.  His mother

 during surgery.



SOCIAL HISTORY:  He is  and has no children.  He is

disabled.



REVIEW OF SYSTEMS:  His appetite has been decreased, especially in the past

couple of days.  The rest of the review of systems except for the above is

noncontributory.



PHYSICAL EXAMINATION:

GENERAL:  He is a well developed, disheveled appearing man, lying in bed,

in no acute distress.

VITAL SIGNS:  The blood pressure is 146/89, temperature is 98.6 degrees,

pulse is 99 and regular, respiration rate is 19.

HEENT:  Examination of his head reveals poor dentition.

NECK:  There is no tenderness in his neck.  Carotids are +2.  No bruits

appreciated.

LUNGS:  The breath sounds are decreased bilaterally.

CARDIOVASCULAR:  Heart tones were hardly audible.  There is no obvious

murmurs, rubs, clicks, S3, or S4.

ABDOMEN:  Distended.  Bowel sounds are decreased.  There is no tenderness,

masses, or organomegaly.

BACK:  Not tested.

EXTREMITIES:  There is evidence of degenerative joint disease in his

fingers.

NEUROLOGIC EXAMINATION:

MENTAL STATUS:  Judgment not tested.  Affect, affect is appropriate.

Memory, past memory is intact to his birthday, 1950.  Immediate

recall is 3/3 objects.  Recent recall was 1/3 objects at 5 minutes, 2/3

with clues.  Orientation, time, he knew it was 2020, thought it was

Tuesday.  Place, he knew he was at [facility].  Person, he was

oriented to person.  Language function, spoken speech was fluent without

paraphasias.  Comprehension and repetition were intact.  He could spell

world backwards and forwards.

CRANIAL NERVE EXAMINATION:

CRANIAL NERVE II:  Visual fields are intact to confrontation.  Fundi were

not visualized.

CRANIAL NERVES III, IV, AND VI:  Extraocular motility was full with

saccadic smooth pursuit.  Pupils were 3 mm, round, sluggishly light

reactive.

CRANIAL NERVE V:  Facial and corneal sensations were intact.

CRANIAL NERVE VII:  Facial strength was 5/5 bilaterally.

CRANIAL NERVE VIII:  Auditory acuity is probably slightly

decreased.

CRANIAL NERVES IX AND X:  Not tested.

CRANIAL NERVE XI:  Sternocleidomastoid strength 5/5.

CRANIAL NERVE XII:  Tongue protrudes in the midline without fasciculations

or atrophy.

MUSCLE EXAMINATION:  Muscle bulk is mildly decreased in the upper

extremities.  Bulk appears to be symmetrical in the lower extremities.

Tone is normal.  Strength is 5/5 in the upper extremities.  Questionable

left pronation.  The lower extremities muscle strength is 5/5.

REFLEXES:  +2 biceps, brachioradialis, +1 right triceps, +2 left triceps, 0

right knee, +2-1/2 to 3 left knee with a crossed adductor response.

Ankles are 0 bilaterally.  Toes were upgoing bilaterally and testing for

Babinski response.

COORDINATION:  Finger-finger-nose reveals slight endpoint tremor without

much dysmetria although slow on the left.  Heel-to-shin testing, may have

been some slight dysmetria in the lower extremities with minimal tremor.

SENSORY EXAMINATION:  Sensation is probably intact to perception in the

toes.  Pinprick was decreased in both legs up to nearly the knees,

probably normal in the upper extremities though it was difficult to tell.

Fine touch was normal.



IMPRESSION:  This patient has multiple reasons for falls.  The first reason

is obviously left hip, which started problems with his gait where he would

need a walker.  Second problem would be medications, which were tend to

depress the central nervous system.  The Dilantin could also cause

cerebellar atrophy in some patients and he appears to have some worsened

cerebral atrophy.  He also has upgoing toes bilaterally and I wonder if he

does not have any cervical spondylosis with cord compression.  He has a

decreased right triceps reflex.  He also had a history of polio, although

the strength in the right lower extremity is normal.  The only thing he

has is a depressed right knee reflex.  Therefore, the patient will need an

MRI scan of the cervical spine and also needs B12, methylmalonic acid

level, serum copper levels if we can get them here.



Cerebellar atrophy can also be caused by paraneoplastic syndrome since

he has elevated smoking history.  I do to want to get a CT scan of the

chest.



The patient does not have any changes in his mental status with his

falls.  Therefore, I do not think these are seizures or syncopal episodes.

In fact, the patient has a new stroke, which is old in the right temporal

tip, which may be embolic although he never had any symptoms.



PLAN:

1. I will speak to you about this case.

2. MRI scan of the cervical spine when available.

3. B12, methylmalonic acid level.

4. CT scan of the chest.

5. Physical therapy.

6. Evaluate C-reactive protein.









  ______________________________________________

  Storm Fink MD





DR:  CHASE

D:  2020 10:35

T:  2020 15:34

JOB#:  [id number]

CC:



JAILENE
History of Present Illness


General


Date patient seen:  Mar 31, 2020


Chief Complaint:  Multiple Trauma/Fall





Present Illness


HPI


70-year-old male with a history of COPD and seizure presented to ER with  chief 

complaint of frequent falls for the last 6 months.  He fell twice yesterday and 

was  Unable to get up.   Patient denies any pain. No focal deficit.  No nausea 

no vomiting.  He does have some diarrhea started last night.  He is admitted to 

med/surg for further management and possible long-term placement.


Allergies:  


Coded Allergies:  


     SHELLFISH DERIVED (Unverified  Allergy, Intermediate, ITCHING/HIVES, 7/25/ 14)


     IODINE (Unverified  Allergy, Unknown, 7/25/14)


Uncoded Allergies:  


     SPINACH (Allergy, Intermediate, SEVERE ITCHING/HIVES, 7/25/14)





Medication History


Scheduled


Acetaminophen* (Acetaminophen 325MG Tablet*), 325 MG ORAL Q4H, (Reported)


Amitriptyline Hcl* (Amitriptyline Hcl*), 75 MG ORAL BEDTIME, (Reported)


Lansoprazole* (Lansoprazole*), 30 MG ORAL DAILY, (Reported)


Latanoprost* (Xalatan*), 1 DROP BOTH EYES BEDTIME, (Reported)


Phenytoin Sodium Extended* (Dilantin*), 100 MG ORAL THREE TIMES A DAY, (Reported

)


Phenytoin Sodium Extended* (Dilantin*), 100 MG ORAL THREE TIMES A DAY, (Reported

)


Primidone* (Mysoline*), 250 MG ORAL THREE TIMES A DAY


Tamsulosin Hcl (Tamsulosin Hcl*), 0.4 MG ORAL BEDTIME, (Reported)


Tolterodine Tartrate (Tolterodine Tartrate), 4 MG PO DAILY, (Reported)


Warfarin Sod* (Warfarin Sod*), 7.5 MG ORAL DAILY, (Reported)





Patient History


Healthcare decision maker





Resuscitation status


Full Code


Advanced Directive on File








Past Medical/Surgical History


Past Medical/Surgical History:  


(1) Seizure disorder, complex partial





Review of Systems


All Other Systems:  negative except mentioned in HPI





Physical Exam


General Appearance:  WD/WN, no apparent distress


Lines, tubes and drains:  peripheral


HEENT:  normocephalic, atraumatic


Neck:  non-tender, normal alignment


Respiratory/Chest:  chest wall non-tender, lungs clear, no respiratory distress


Cardiovascular/Chest:  normal peripheral pulses, normal rate


Abdomen:  normal bowel sounds, non tender


Genitourinary/Rectal:  normal genital exam


Extremities:  normal range of motion


Neurologic:  CNs II-XII grossly normal





Last 24 Hour Vital Signs








  Date Time  Temp Pulse Resp B/P (MAP) Pulse Ox O2 Delivery O2 Flow Rate FiO2


 


3/31/20 12:00 98.5 94 17 116/70 (85) 98   


 


3/31/20 09:02    161/92    


 


3/31/20 08:17      Nasal Cannula 3.0 


 


3/31/20 08:00 98.4 90 18 161/92 (115) 97   


 


3/31/20 04:00 98.1 90 17 147/84 (105) 94   


 


3/31/20 02:41      Nasal Cannula 3.0 


 


3/31/20 00:50 98.8 86 23 135/73 96 Room Air  


 


3/30/20 23:30 98.8 89 16 146/72 95 Room Air  


 


3/30/20 22:50  88 16   Room Air  


 


3/30/20 22:50 98.8 88 16 150/83 92 Room Air  


 


3/30/20 22:44 98.8 88 16 150/83 (105) 92 Room Air  

















Intake and Output  


 


 3/30/20 3/31/20





 19:00 07:00


 


Intake Total  150 ml


 


Balance  150 ml


 


  


 


Intake IV Total  150 ml


 


# Voids  3











Laboratory Tests








Test


  3/30/20


22:55 3/30/20


23:14 3/31/20


05:30


 


White Blood Count


  9.3 K/UL


(4.8-10.8) 


  7.4 K/UL


(4.8-10.8)


 


Red Blood Count


  4.32 M/UL


(4.70-6.10)  L 


  4.24 M/UL


(4.70-6.10)  L


 


Hemoglobin


  13.9 G/DL


(14.2-18.0)  L 


  14.0 G/DL


(14.2-18.0)  L


 


Hematocrit


  41.3 %


(42.0-52.0)  L 


  39.2 %


(42.0-52.0)  L


 


Mean Corpuscular Volume 96 FL (80-99)    92 FL (80-99)  


 


Mean Corpuscular Hemoglobin


  32.3 PG


(27.0-31.0)  H 


  33.0 PG


(27.0-31.0)  H


 


Mean Corpuscular Hemoglobin


Concent 33.8 G/DL


(32.0-36.0) 


  35.8 G/DL


(32.0-36.0)


 


Red Cell Distribution Width


  12.9 %


(11.6-14.8) 


  12.3 %


(11.6-14.8)


 


Platelet Count


  185 K/UL


(150-450) 


  188 K/UL


(150-450)


 


Mean Platelet Volume


  7.5 FL


(6.5-10.1) 


  6.3 FL


(6.5-10.1)  L


 


Neutrophils (%) (Auto)


  81.4 %


(45.0-75.0)  H 


  76.3 %


(45.0-75.0)  H


 


Lymphocytes (%) (Auto)


  7.8 %


(20.0-45.0)  L 


  12.0 %


(20.0-45.0)  L


 


Monocytes (%) (Auto)


  9.1 %


(1.0-10.0) 


  10.6 %


(1.0-10.0)  H


 


Eosinophils (%) (Auto)


  0.1 %


(0.0-3.0) 


  0.4 %


(0.0-3.0)


 


Basophils (%) (Auto)


  1.6 %


(0.0-2.0) 


  0.7 %


(0.0-2.0)


 


Sodium Level


  146 MMOL/L


(136-145)  H 


  144 MMOL/L


(136-145)


 


Potassium Level


  4.2 MMOL/L


(3.5-5.1) 


  3.6 MMOL/L


(3.5-5.1)


 


Chloride Level


  103 MMOL/L


() 


  104 MMOL/L


()


 


Carbon Dioxide Level


  27 MMOL/L


(21-32) 


  28 MMOL/L


(21-32)


 


Anion Gap


  16 mmol/L


(5-15)  H 


  12 mmol/L


(5-15)


 


Blood Urea Nitrogen


  18 mg/dL


(7-18) 


  12 mg/dL


(7-18)


 


Creatinine


  0.9 MG/DL


(0.55-1.30) 


  0.7 MG/DL


(0.55-1.30)


 


Estimat Glomerular Filtration


Rate > 60 mL/min


(>60) 


  > 60 mL/min


(>60)


 


Glucose Level


  130 MG/DL


()  H 


  123 MG/DL


()  H


 


Calcium Level


  10.2 MG/DL


(8.5-10.1)  H 


  9.5 MG/DL


(8.5-10.1)


 


Total Bilirubin


  0.5 MG/DL


(0.2-1.0) 


  0.5 MG/DL


(0.2-1.0)


 


Aspartate Amino Transf


(AST/SGOT) 64 U/L (15-37)


H 


  50 U/L (15-37)


H


 


Alanine Aminotransferase


(ALT/SGPT) 60 U/L (12-78)


  


  49 U/L (12-78)


 


 


Alkaline Phosphatase


  220 U/L


()  H 


  185 U/L


()  H


 


Total Creatine Kinase


  434 U/L


()  H 


  


 


 


Creatine Kinase MB


  3.5 NG/ML


(0.0-3.6) 


  


 


 


Creatine Kinase MB Relative


Index 0.8  


  


  


 


 


Total Protein


  8.1 G/DL


(6.4-8.2) 


  7.0 G/DL


(6.4-8.2)


 


Albumin


  4.3 G/DL


(3.4-5.0) 


  3.6 G/DL


(3.4-5.0)


 


Globulin 3.8 g/dL    3.4 g/dL  


 


Albumin/Globulin Ratio 1.1 (1.0-2.7)    1.1 (1.0-2.7)  


 


Phenytoin (Dilantin) Level


  17.8 ug/mL


(10-20) 


  


 


 


Urine Color  Yellow   


 


Urine Appearance  Clear   


 


Urine pH  6 (4.5-8.0)   


 


Urine Specific Gravity


  


  1.020


(1.005-1.035) 


 


 


Urine Protein


  


  Negative


(NEGATIVE) 


 


 


Urine Glucose (UA)


  


  Negative


(NEGATIVE) 


 


 


Urine Ketones


  


  Negative


(NEGATIVE) 


 


 


Urine Blood


  


  Negative


(NEGATIVE) 


 


 


Urine Nitrite


  


  Negative


(NEGATIVE) 


 


 


Urine Bilirubin


  


  Negative


(NEGATIVE) 


 


 


Urine Urobilinogen


  


  Normal MG/DL


(0.0-1.0) 


 


 


Urine Leukocyte Esterase


  


  Negative


(NEGATIVE) 


 


 


Urine RBC


  


  0-2 /HPF (0 -


0)  H 


 


 


Urine WBC


  


  0 /HPF (0 - 0)


  


 


 


Urine Squamous Epithelial


Cells 


  None /LPF


(NONE/OCC) 


 


 


Urine Bacteria


  


  None /HPF


(NONE) 


 


 


Phosphorus Level


  


  


  2.7 MG/DL


(2.5-4.9)


 


Magnesium Level


  


  


  1.9 MG/DL


(1.8-2.4)








Height (Feet):  5


Height (Inches):  10.00


Weight (Pounds):  160


Medications





Current Medications








 Medications


  (Trade)  Dose


 Ordered  Sig/Eddie


 Route


 PRN Reason  Start Time


 Stop Time Status Last Admin


Dose Admin


 


 Acetaminophen


  (Tylenol)  650 mg  Q4HR  PRN


 ORAL


 TEMP>100.5  3/31/20 00:15


     


 


 


 Acetaminophen


  (Tylenol)  650 mg  Q6H  PRN


 ORAL


 Mild Pain/Temp > 100.5  3/31/20 02:15


 4/30/20 02:14   


 


 


 Amitriptyline HCl


  (Elavil)  25 mg  BEDTIME


 ORAL


   3/31/20 21:00


 4/30/20 20:59   


 


 


 Clonidine HCl


  (Catapres Tab)  0.1 mg  Q4H  PRN


 ORAL


 sbp greater than 160  3/31/20 08:30


 6/29/20 08:29  3/31/20 09:02


 


 


 Dextrose/Sodium


 Chloride  1,000 ml @ 


 75 mls/hr  [id number]


 IV


   3/31/20 02:15


 4/30/20 02:14  3/31/20 04:25


 


 


 Heparin Sodium


  (Porcine)


  (Heparin 5000


 units/ml)  5,000 units  EVERY 8  HOURS


 SUBQ


   3/31/20 06:00


 5/15/20 05:59  3/31/20 05:48


 


 


 Latanoprost


  (Xalatan)  1 drop  BEDTIME


 BOTH EYES


   3/31/20 21:00


 4/30/20 20:59   


 


 


 Non-Formulary


 Medication


  (Non-Formulary


 Med)  1 ea  BEDTIME


 ORAL


   3/31/20 21:00


 4/30/20 20:59 UNV  


 


 


 Ondansetron HCl


  (Zofran)  4 mg  PRN  PRN


 IVP


 Nausea & Vomiting  3/31/20 00:15


     


 


 


 Pantoprazole


  (Protonix)  40 mg  DAILY


 ORAL


   3/31/20 09:00


 4/30/20 08:59  3/31/20 09:02


 


 


 Phenytoin


  (Dilantin)  100 mg  BEDTIME


 ORAL


   3/31/20 21:00


 4/30/20 20:59   


 


 


 Phenytoin


  (Dilantin)  200 mg  DAILY


 ORAL


   3/31/20 09:00


 4/30/20 08:59  3/31/20 09:02


 


 


 Primidone


  (Mysoline)  250 mg  DAILY


 ORAL


   3/31/20 09:00


 4/30/20 08:59  3/31/20 09:02


 


 


 Primidone


  (Mysoline)  500 mg  BEDTIME


 ORAL


   3/31/20 21:00


 4/30/20 20:59   


 


 


 Sumatriptan


 Succinate


  (Imitrex)  100 mg  DAILY  PRN


 ORAL


 For Pain  3/31/20 02:30


 4/30/20 02:29   


 


 


 Tamsulosin HCl


  (Flomax)  0.8 mg  BEDTIME


 ORAL


   3/31/20 21:00


 4/30/20 20:59   


 











Assessment/Plan


Problem List:  


(1) Falls frequently


ICD Codes:  R29.6 - Repeated falls


SNOMED:  830499951


(2) Failure to thrive in adult


ICD Codes:  R62.7 - Adult failure to thrive


SNOMED:  553240550, 2876501


(3) Seizure disorder, complex partial


ICD Codes:  G40.209 - Localization-related (focal) (partial) symptomatic 

epilepsy and epileptic syndromes with complex partial seizures, not intractable

, without status epilepticus


SNOMED:  055984760


(4) ACS (acute coronary syndrome)


ICD Codes:  I20.0 - Unstable angina


SNOMED:  920731797


Assessment/Plan:


pt/ot


neuro evaluation


MRI of brain


calorie count


symptomatic treatment











Surekha Dewey MD Mar 31, 2020 12:58
patient

## 2023-01-05 NOTE — NUR
NOTES







SPOKE WITH COREY FERNANDES, MADE AWARE OF DCP. PROVIDED BILL WITH THE FACILITY CONTACT 
INFORMATION. OK TO DC.







COREY FERNANDES [phone] Stable  High grade distal PDA stenosis to RCA; remains on BB/nitrates  PCI to circ 7/2022 8/22 nuclear stress test neg for ischemia